# Patient Record
Sex: MALE | Race: WHITE | NOT HISPANIC OR LATINO | Employment: UNEMPLOYED | URBAN - NONMETROPOLITAN AREA
[De-identification: names, ages, dates, MRNs, and addresses within clinical notes are randomized per-mention and may not be internally consistent; named-entity substitution may affect disease eponyms.]

---

## 2017-01-03 RX ORDER — GABAPENTIN 600 MG/1
TABLET ORAL
Qty: 120 TABLET | Refills: 1 | Status: SHIPPED | OUTPATIENT
Start: 2017-01-03 | End: 2017-03-01 | Stop reason: SDUPTHER

## 2017-01-16 ENCOUNTER — OFFICE VISIT (OUTPATIENT)
Dept: FAMILY MEDICINE CLINIC | Facility: CLINIC | Age: 62
End: 2017-01-16

## 2017-01-16 VITALS
HEIGHT: 71 IN | OXYGEN SATURATION: 97 % | BODY MASS INDEX: 20.86 KG/M2 | WEIGHT: 149 LBS | DIASTOLIC BLOOD PRESSURE: 64 MMHG | SYSTOLIC BLOOD PRESSURE: 96 MMHG | HEART RATE: 60 BPM

## 2017-01-16 DIAGNOSIS — M48.061 SPINAL STENOSIS OF LUMBAR REGION: ICD-10-CM

## 2017-01-16 DIAGNOSIS — M51.36 DEGENERATION OF INTERVERTEBRAL DISC OF LUMBAR REGION: ICD-10-CM

## 2017-01-16 DIAGNOSIS — M47.812 OSTEOARTHRITIS OF CERVICAL SPINE, UNSPECIFIED SPINAL OSTEOARTHRITIS COMPLICATION STATUS: ICD-10-CM

## 2017-01-16 DIAGNOSIS — M48.02 SPINAL STENOSIS OF CERVICAL REGION: ICD-10-CM

## 2017-01-16 DIAGNOSIS — M47.892 OTHER OSTEOARTHRITIS OF SPINE, CERVICAL REGION: ICD-10-CM

## 2017-01-16 DIAGNOSIS — M50.30 DEGENERATION OF INTERVERTEBRAL DISC OF CERVICAL REGION: ICD-10-CM

## 2017-01-16 DIAGNOSIS — E11.9 WELL CONTROLLED DIABETES MELLITUS (HCC): ICD-10-CM

## 2017-01-16 DIAGNOSIS — G89.4 CHRONIC PAIN SYNDROME: Primary | ICD-10-CM

## 2017-01-16 LAB — GLUCOSE BLDC GLUCOMTR-MCNC: 112 MG/DL (ref 70–130)

## 2017-01-16 PROCEDURE — 82962 GLUCOSE BLOOD TEST: CPT | Performed by: FAMILY MEDICINE

## 2017-01-16 PROCEDURE — 99214 OFFICE O/P EST MOD 30 MIN: CPT | Performed by: FAMILY MEDICINE

## 2017-01-16 RX ORDER — OXYCODONE AND ACETAMINOPHEN 10; 325 MG/1; MG/1
TABLET ORAL
Qty: 180 TABLET | Refills: 0 | Status: SHIPPED | OUTPATIENT
Start: 2017-01-16 | End: 2017-02-16 | Stop reason: SDUPTHER

## 2017-01-16 NOTE — MR AVS SNAPSHOT
Michael Ned Cyrus   1/16/2017 10:45 AM   Office Visit    Dept Phone:  [phone]   Encounter #:  [id number]    Provider:  Diana Priest MD   Department:  [facility] PRIMARY CARE                Your Full Care Plan              Today's Medication Changes          These changes are accurate as of: 1/16/17 11:41 AM.  If you have any questions, ask your nurse or doctor.               Stop taking medication(s)listed here:     Vitamin D3 5000 UNITS tablet   Stopped by:  Diana Priest MD                Where to Get Your Medications      You can get these medications from any pharmacy     Bring a paper prescription for each of these medications     oxyCODONE-acetaminophen  MG per tablet                  Your Updated Medication List          This list is accurate as of: 1/16/17 11:41 AM.  Always use your most recent med list.                amitriptyline 75 MG tablet   Commonly known as:  ELAVIL       aspirin  MG tablet       atenolol 25 MG tablet   Commonly known as:  TENORMIN       B-12 1000 MCG capsule       buPROPion  MG 24 hr tablet   Commonly known as:  WELLBUTRIN XL       COMBIVENT RESPIMAT  MCG/ACT inhaler   Generic drug:  ipratropium-albuterol       cyclobenzaprine 10 MG tablet   Commonly known as:  FLEXERIL   Take 1 tablet by mouth 3 (Three) Times a Day As Needed for muscle spasms.       diphenhydrAMINE 25 mg capsule   Commonly known as:  BENADRYL       enalapril 5 MG tablet   Commonly known as:  VASOTEC       gabapentin 600 MG tablet   Commonly known as:  NEURONTIN   take 1 tablet by mouth twice a day MAY REPEAT 1 TO 2 TABLETS IF NEEDED       ibuprofen 800 MG tablet   Commonly known as:  ADVIL,MOTRIN       metFORMIN 500 MG tablet   Commonly known as:  GLUCOPHAGE       NASONEX 50 MCG/ACT nasal spray   Generic drug:  mometasone       oxyCODONE-acetaminophen  MG per tablet   Commonly known as:  ENDOCET   1 po q 4 hrs prn severe pain (up  "to 6 per day).       Polyethylene Glycol 3350 granules       raNITIdine 150 MG tablet   Commonly known as:  ZANTAC   take 1 tablet by mouth twice a day       simvastatin 20 MG tablet   Commonly known as:  ZOCOR               You Were Diagnosed With        Codes Comments    Chronic pain syndrome    -  Primary ICD-10-CM: G89.4  ICD-9-CM: 338.4     Spinal stenosis of cervical region     ICD-10-CM: M48.02  ICD-9-CM: 723.0     Spinal stenosis of lumbar region     ICD-10-CM: M48.06  ICD-9-CM: 724.02     Well controlled diabetes mellitus     ICD-10-CM: E11.9  ICD-9-CM: 250.00     Degeneration of intervertebral disc of lumbar region     ICD-10-CM: M51.36  ICD-9-CM: 722.52     Degeneration of intervertebral disc of cervical region     ICD-10-CM: M50.30  ICD-9-CM: 722.4     Other osteoarthritis of spine, cervical region     ICD-10-CM: M47.892     Osteoarthritis of cervical spine, unspecified spinal osteoarthritis complication status     ICD-10-CM: M47.812  ICD-9-CM: 721.0       Instructions     None    Patient Instructions History      Upcoming Appointments     Visit Type Date Time Department    FOLLOW UP 1/16/2017 10:45 AM [facility]      [facility] Signup     Our records indicate that you have declined [facility] signup. If you would like to sign up for [facility], please email [email or web address] or call [phone] to obtain an activation code.             Other Info from Your Visit           Allergies     Acetaminophen-codeine      Iodinated Diagnostic Agents      Morphine And Related      Other      Hydrocodone-acetaminophen  Itching, Rash      Reason for Visit     Back Pain left hip and down leg.      Vital Signs     Blood Pressure Pulse Height Weight Oxygen Saturation Body Mass Index    96/64 60 71\" (180.3 cm) 149 lb (67.6 kg) 97% 20.78 kg/m2    Smoking Status                   Current Every Day Smoker           Problems and Diagnoses Noted     Chronic pain syndrome    Degeneration of intervertebral " disc of cervical region    Degeneration of lumbar or lumbosacral intervertebral disc    Degenerative arthritis of cervical spine    Narrowing of cervical spine    Narrowing of spinal canal    Well controlled diabetes mellitus
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
## 2017-01-19 ENCOUNTER — TELEPHONE (OUTPATIENT)
Dept: FAMILY MEDICINE CLINIC | Facility: CLINIC | Age: 62
End: 2017-01-19

## 2017-01-19 NOTE — TELEPHONE ENCOUNTER
----- Message from Brandi Chambers sent at 1/19/2017 11:21 AM EST -----  Regarding: RE: F/U apts  Per Dr Dill's office, pt has canceled his back surgery 5 times and was no show for his follow-up in November.  They are allowing him to resched once more (sched for 1/26/17 @ 1:10 PM), but if he misses this appt, they will not resched him.    Per Dr Rico's office, pt has had two no shows since November - last appt was December no show.  They have resched him for next Wed 1/25/2016 @ 12:30 PM at the [facility]  @ [facility].     Pt wife has been informed of appt dates/times and also the no shows as well.  Voiced understanding.  ----- Message -----     From: Diana Priest MD     Sent: 1/16/2017   1:12 PM       To: Brandi Chambers  Subject: F/U apts                                         Pt/spouse state they needs assistance in rescheduling f/u with Dr. Rico's office as well as with Dr. Dill. Apparently she has been attempting to schedule f/u with Dr. Dill and isn't getting anywhere.

## 2017-02-09 ENCOUNTER — HOSPITAL ENCOUNTER (OUTPATIENT)
Dept: CT IMAGING | Facility: HOSPITAL | Age: 62
Discharge: HOME | End: 2017-02-09
Admitting: FAMILY MEDICINE

## 2017-02-09 DIAGNOSIS — R91.1 NODULE OF RIGHT LUNG: ICD-10-CM

## 2017-02-09 DIAGNOSIS — R93.89 ABNORMAL CHEST CT: ICD-10-CM

## 2017-02-09 PROCEDURE — 71250 CT THORAX DX C-: CPT

## 2017-02-16 ENCOUNTER — OFFICE VISIT (OUTPATIENT)
Dept: FAMILY MEDICINE CLINIC | Facility: CLINIC | Age: 62
End: 2017-02-16

## 2017-02-16 VITALS
BODY MASS INDEX: 21.56 KG/M2 | DIASTOLIC BLOOD PRESSURE: 78 MMHG | OXYGEN SATURATION: 98 % | WEIGHT: 154 LBS | SYSTOLIC BLOOD PRESSURE: 112 MMHG | HEART RATE: 68 BPM | HEIGHT: 71 IN

## 2017-02-16 DIAGNOSIS — M51.36 DEGENERATION OF INTERVERTEBRAL DISC OF LUMBAR REGION: ICD-10-CM

## 2017-02-16 DIAGNOSIS — E11.9 WELL CONTROLLED DIABETES MELLITUS (HCC): Primary | ICD-10-CM

## 2017-02-16 DIAGNOSIS — M47.812 OSTEOARTHRITIS OF CERVICAL SPINE, UNSPECIFIED SPINAL OSTEOARTHRITIS COMPLICATION STATUS: ICD-10-CM

## 2017-02-16 DIAGNOSIS — R91.1 NODULE OF RIGHT LUNG: ICD-10-CM

## 2017-02-16 DIAGNOSIS — F17.200 TOBACCO DEPENDENCE SYNDROME: ICD-10-CM

## 2017-02-16 DIAGNOSIS — M48.02 SPINAL STENOSIS OF CERVICAL REGION: ICD-10-CM

## 2017-02-16 DIAGNOSIS — M50.30 DEGENERATION OF INTERVERTEBRAL DISC OF CERVICAL REGION: ICD-10-CM

## 2017-02-16 DIAGNOSIS — M48.061 SPINAL STENOSIS OF LUMBAR REGION: ICD-10-CM

## 2017-02-16 DIAGNOSIS — G89.4 CHRONIC PAIN SYNDROME: ICD-10-CM

## 2017-02-16 LAB — GLUCOSE BLDC GLUCOMTR-MCNC: 122 MG/DL (ref 70–130)

## 2017-02-16 PROCEDURE — 99406 BEHAV CHNG SMOKING 3-10 MIN: CPT | Performed by: FAMILY MEDICINE

## 2017-02-16 PROCEDURE — 99214 OFFICE O/P EST MOD 30 MIN: CPT | Performed by: FAMILY MEDICINE

## 2017-02-16 PROCEDURE — 82962 GLUCOSE BLOOD TEST: CPT | Performed by: FAMILY MEDICINE

## 2017-02-16 RX ORDER — OXYCODONE AND ACETAMINOPHEN 10; 325 MG/1; MG/1
TABLET ORAL
Qty: 180 TABLET | Refills: 0 | Status: SHIPPED | OUTPATIENT
Start: 2017-02-16 | End: 2017-04-19 | Stop reason: SDUPTHER

## 2017-02-16 RX ORDER — NICOTINE 21 MG/24HR
1 PATCH, TRANSDERMAL 24 HOURS TRANSDERMAL EVERY 24 HOURS
Qty: 28 PATCH | Refills: 1 | Status: SHIPPED | OUTPATIENT
Start: 2017-02-16 | End: 2017-04-19
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
## 2017-03-01 RX ORDER — GABAPENTIN 600 MG/1
TABLET ORAL
Qty: 120 TABLET | Refills: 1 | Status: SHIPPED | OUTPATIENT
Start: 2017-03-01 | End: 2017-04-02 | Stop reason: SDUPTHER

## 2017-03-01 RX ORDER — RANITIDINE 150 MG/1
TABLET ORAL
Qty: 60 TABLET | Refills: 5 | Status: SHIPPED | OUTPATIENT
Start: 2017-03-01 | End: 2017-08-21 | Stop reason: SDUPTHER

## 2017-03-07 ENCOUNTER — APPOINTMENT (OUTPATIENT)
Dept: PREADMISSION TESTING | Facility: HOSPITAL | Age: 62
End: 2017-03-07

## 2017-03-07 VITALS — WEIGHT: 154 LBS | HEIGHT: 71 IN | BODY MASS INDEX: 21.56 KG/M2

## 2017-03-07 LAB
ANION GAP SERPL CALCULATED.3IONS-SCNC: 10.6 MMOL/L
APTT PPP: 31.8 SECONDS (ref 25–36)
BILIRUB UR QL STRIP: NEGATIVE
BUN BLD-MCNC: 10 MG/DL (ref 7–20)
BUN/CREAT SERPL: 10 (ref 6.3–21.9)
CALCIUM SPEC-SCNC: 9.2 MG/DL (ref 8.4–10.2)
CHLORIDE SERPL-SCNC: 105 MMOL/L (ref 98–107)
CLARITY UR: CLEAR
CO2 SERPL-SCNC: 30 MMOL/L (ref 26–30)
COLOR UR: YELLOW
CREAT BLD-MCNC: 1 MG/DL (ref 0.6–1.3)
DEPRECATED RDW RBC AUTO: 48.7 FL (ref 37–54)
ERYTHROCYTE [DISTWIDTH] IN BLOOD BY AUTOMATED COUNT: 14.5 % (ref 11.5–14.5)
GFR SERPL CREATININE-BSD FRML MDRD: 76 ML/MIN/1.73
GLUCOSE BLD-MCNC: 89 MG/DL (ref 74–98)
GLUCOSE UR STRIP-MCNC: NEGATIVE MG/DL
HCT VFR BLD AUTO: 48.1 % (ref 42–52)
HGB BLD-MCNC: 16.2 G/DL (ref 14–18)
HGB UR QL STRIP.AUTO: NEGATIVE
INR PPP: 0.97 (ref 0.9–1.1)
KETONES UR QL STRIP: NEGATIVE
LEUKOCYTE ESTERASE UR QL STRIP.AUTO: NEGATIVE
MCH RBC QN AUTO: 30.6 PG (ref 27–31)
MCHC RBC AUTO-ENTMCNC: 33.7 G/DL (ref 30–37)
MCV RBC AUTO: 90.8 FL (ref 80–94)
NITRITE UR QL STRIP: NEGATIVE
PH UR STRIP.AUTO: 7 [PH] (ref 5–8)
PLATELET # BLD AUTO: 255 10*3/MM3 (ref 130–400)
PMV BLD AUTO: 10.8 FL (ref 6–12)
POTASSIUM BLD-SCNC: 4.6 MMOL/L (ref 3.5–5.1)
PROT UR QL STRIP: NEGATIVE
PROTHROMBIN TIME: 10.6 SECONDS (ref 9.3–12.1)
RBC # BLD AUTO: 5.3 10*6/MM3 (ref 4.7–6.1)
SODIUM BLD-SCNC: 141 MMOL/L (ref 137–145)
SP GR UR STRIP: 1.01 (ref 1–1.03)
UROBILINOGEN UR QL STRIP: NORMAL
WBC NRBC COR # BLD: 6.68 10*3/MM3 (ref 4.8–10.8)

## 2017-03-07 PROCEDURE — 93005 ELECTROCARDIOGRAM TRACING: CPT | Performed by: ORTHOPAEDIC SURGERY

## 2017-03-07 PROCEDURE — 85027 COMPLETE CBC AUTOMATED: CPT | Performed by: ORTHOPAEDIC SURGERY

## 2017-03-07 PROCEDURE — 36415 COLL VENOUS BLD VENIPUNCTURE: CPT

## 2017-03-07 PROCEDURE — 85730 THROMBOPLASTIN TIME PARTIAL: CPT | Performed by: ORTHOPAEDIC SURGERY

## 2017-03-07 PROCEDURE — 80048 BASIC METABOLIC PNL TOTAL CA: CPT | Performed by: ORTHOPAEDIC SURGERY

## 2017-03-07 PROCEDURE — 85610 PROTHROMBIN TIME: CPT | Performed by: ORTHOPAEDIC SURGERY

## 2017-03-07 PROCEDURE — 81003 URINALYSIS AUTO W/O SCOPE: CPT | Performed by: ORTHOPAEDIC SURGERY

## 2017-03-07 NOTE — PAT
CALLED JONAH ASHFORD, CRNA R/T PT WITH A PULSE RATE ON EKG OF 45.  CALLED DR. ORLANDO'S OFFICE TO CHECK TO SEE WHAT TYPICAL HR IS FOR PT.  WAS TOLD THAT IT HAS BEEN 55 AND 52 ON THE LAST 2 VISITS (JUNE 2016 AND JAN 2017).  JONAH STATED PT NEEDED A CARDIAC CLEARANCE BEFORE SURGERY.  INFORMED PT/WIFE.  VERBALIZED UNDERSTANDING.  CALLED DR. TRIVEDI'S OFFICE.  LEFT NORIS A VOICE MSG TO CALL ME BACK.

## 2017-03-07 NOTE — DISCHARGE INSTRUCTIONS
Pt instructed on PAT PASS information.  Pt verbalizes understanding.  Chlorhexidine wipes along with instruction/verification sheet given to pt.  Instructed pt to apply stickers from chlorhexidine wipes to verification sheet once skin prep has been  completed, and to return to [facility] the day of the procedure.  Pt. Verbalizes understanding.

## 2017-03-08 NOTE — PAT
SPOKE WITH NORIS AT DR. TRIVEDI'S OFFICE.  STATED SHE WOULD ARRANGE APPT FOR PT TO HAVE CARDIAC CLEARANCE WITH DR. ORLANDO.

## 2017-03-14 ENCOUNTER — ANESTHESIA EVENT (OUTPATIENT)
Dept: PERIOP | Facility: HOSPITAL | Age: 62
End: 2017-03-14

## 2017-03-14 ENCOUNTER — HOSPITAL ENCOUNTER (OUTPATIENT)
Facility: HOSPITAL | Age: 62
Discharge: HOME | End: 2017-03-15
Attending: ORTHOPAEDIC SURGERY | Admitting: ORTHOPAEDIC SURGERY

## 2017-03-14 ENCOUNTER — ANESTHESIA (OUTPATIENT)
Dept: PERIOP | Facility: HOSPITAL | Age: 62
End: 2017-03-14

## 2017-03-14 ENCOUNTER — APPOINTMENT (OUTPATIENT)
Dept: GENERAL RADIOLOGY | Facility: HOSPITAL | Age: 62
End: 2017-03-14

## 2017-03-14 DIAGNOSIS — Z74.09 IMPAIRED MOBILITY AND ADLS: ICD-10-CM

## 2017-03-14 DIAGNOSIS — Z78.9 IMPAIRED MOBILITY AND ADLS: ICD-10-CM

## 2017-03-14 DIAGNOSIS — Z74.09 IMPAIRED FUNCTIONAL MOBILITY, BALANCE, GAIT, AND ENDURANCE: Primary | ICD-10-CM

## 2017-03-14 LAB
ABO GROUP BLD: NORMAL
ABO GROUP BLD: NORMAL
BLD GP AB SCN SERPL QL: NEGATIVE
GLUCOSE BLDC GLUCOMTR-MCNC: 69 MG/DL (ref 70–130)
GLUCOSE BLDC GLUCOMTR-MCNC: 75 MG/DL (ref 70–130)
GLUCOSE BLDC GLUCOMTR-MCNC: 96 MG/DL (ref 70–130)
RH BLD: POSITIVE
RH BLD: POSITIVE

## 2017-03-14 PROCEDURE — 86901 BLOOD TYPING SEROLOGIC RH(D): CPT | Performed by: ORTHOPAEDIC SURGERY

## 2017-03-14 PROCEDURE — G0378 HOSPITAL OBSERVATION PER HR: HCPCS

## 2017-03-14 PROCEDURE — 86900 BLOOD TYPING SEROLOGIC ABO: CPT

## 2017-03-14 PROCEDURE — 94762 N-INVAS EAR/PLS OXIMTRY CONT: CPT

## 2017-03-14 PROCEDURE — 25010000002 PROPOFOL 200 MG/20ML EMULSION: Performed by: NURSE ANESTHETIST, CERTIFIED REGISTERED

## 2017-03-14 PROCEDURE — 86900 BLOOD TYPING SEROLOGIC ABO: CPT | Performed by: ORTHOPAEDIC SURGERY

## 2017-03-14 PROCEDURE — 86850 RBC ANTIBODY SCREEN: CPT | Performed by: ORTHOPAEDIC SURGERY

## 2017-03-14 PROCEDURE — 25010000002 HYDROMORPHONE PER 4 MG

## 2017-03-14 PROCEDURE — 25010000002 SUCCINYLCHOLINE PER 20 MG: Performed by: NURSE ANESTHETIST, CERTIFIED REGISTERED

## 2017-03-14 PROCEDURE — 82962 GLUCOSE BLOOD TEST: CPT

## 2017-03-14 PROCEDURE — 25010000002 MIDAZOLAM PER 1 MG: Performed by: NURSE ANESTHETIST, CERTIFIED REGISTERED

## 2017-03-14 PROCEDURE — 25010000002 METHYLPREDNISOLONE PER 40 MG: Performed by: ORTHOPAEDIC SURGERY

## 2017-03-14 PROCEDURE — 94799 UNLISTED PULMONARY SVC/PX: CPT

## 2017-03-14 PROCEDURE — 25010000003 CEFAZOLIN PER 500 MG: Performed by: ORTHOPAEDIC SURGERY

## 2017-03-14 PROCEDURE — 86901 BLOOD TYPING SEROLOGIC RH(D): CPT

## 2017-03-14 PROCEDURE — 25010000002 FENTANYL CITRATE (PF) 250 MCG/5ML SOLUTION: Performed by: NURSE ANESTHETIST, CERTIFIED REGISTERED

## 2017-03-14 PROCEDURE — 76000 FLUOROSCOPY <1 HR PHYS/QHP: CPT

## 2017-03-14 RX ORDER — LIDOCAINE HYDROCHLORIDE 10 MG/ML
INJECTION, SOLUTION INFILTRATION; PERINEURAL AS NEEDED
Status: DISCONTINUED | OUTPATIENT
Start: 2017-03-14 | End: 2017-03-14 | Stop reason: HOSPADM

## 2017-03-14 RX ORDER — FENTANYL CITRATE 50 UG/ML
INJECTION, SOLUTION INTRAMUSCULAR; INTRAVENOUS AS NEEDED
Status: DISCONTINUED | OUTPATIENT
Start: 2017-03-14 | End: 2017-03-14 | Stop reason: SURG

## 2017-03-14 RX ORDER — GABAPENTIN 300 MG/1
600 CAPSULE ORAL EVERY 8 HOURS SCHEDULED
Status: DISCONTINUED | OUTPATIENT
Start: 2017-03-14 | End: 2017-03-15 | Stop reason: HOSPADM

## 2017-03-14 RX ORDER — PROMETHAZINE HYDROCHLORIDE 25 MG/1
25 SUPPOSITORY RECTAL ONCE AS NEEDED
Status: DISCONTINUED | OUTPATIENT
Start: 2017-03-14 | End: 2017-03-14 | Stop reason: HOSPADM

## 2017-03-14 RX ORDER — SODIUM CHLORIDE, SODIUM LACTATE, POTASSIUM CHLORIDE, CALCIUM CHLORIDE 600; 310; 30; 20 MG/100ML; MG/100ML; MG/100ML; MG/100ML
1000 INJECTION, SOLUTION INTRAVENOUS CONTINUOUS PRN
Status: DISCONTINUED | OUTPATIENT
Start: 2017-03-14 | End: 2017-03-14 | Stop reason: HOSPADM

## 2017-03-14 RX ORDER — METHYLPREDNISOLONE ACETATE 80 MG/ML
INJECTION, SUSPENSION INTRA-ARTICULAR; INTRALESIONAL; INTRAMUSCULAR; SOFT TISSUE
Status: DISPENSED
Start: 2017-03-14 | End: 2017-03-15

## 2017-03-14 RX ORDER — IPRATROPIUM BROMIDE AND ALBUTEROL SULFATE 2.5; .5 MG/3ML; MG/3ML
3 SOLUTION RESPIRATORY (INHALATION) ONCE AS NEEDED
Status: DISCONTINUED | OUTPATIENT
Start: 2017-03-14 | End: 2017-03-14 | Stop reason: HOSPADM

## 2017-03-14 RX ORDER — SODIUM CHLORIDE 0.9 % (FLUSH) 0.9 %
1-10 SYRINGE (ML) INJECTION AS NEEDED
Status: DISCONTINUED | OUTPATIENT
Start: 2017-03-14 | End: 2017-03-15 | Stop reason: HOSPADM

## 2017-03-14 RX ORDER — FAMOTIDINE 20 MG/1
20 TABLET, FILM COATED ORAL 2 TIMES DAILY
Status: DISCONTINUED | OUTPATIENT
Start: 2017-03-14 | End: 2017-03-15 | Stop reason: HOSPADM

## 2017-03-14 RX ORDER — PROPOFOL 10 MG/ML
INJECTION, EMULSION INTRAVENOUS AS NEEDED
Status: DISCONTINUED | OUTPATIENT
Start: 2017-03-14 | End: 2017-03-14 | Stop reason: SURG

## 2017-03-14 RX ORDER — SODIUM CHLORIDE 9 MG/ML
100 INJECTION, SOLUTION INTRAVENOUS CONTINUOUS
Status: DISCONTINUED | OUTPATIENT
Start: 2017-03-14 | End: 2017-03-15 | Stop reason: HOSPADM

## 2017-03-14 RX ORDER — IPRATROPIUM BROMIDE AND ALBUTEROL SULFATE 2.5; .5 MG/3ML; MG/3ML
3 SOLUTION RESPIRATORY (INHALATION) EVERY 6 HOURS PRN
Status: DISCONTINUED | OUTPATIENT
Start: 2017-03-14 | End: 2017-03-15 | Stop reason: HOSPADM

## 2017-03-14 RX ORDER — NEOSTIGMINE METHYLSULFATE 5 MG/5 ML
SYRINGE (ML) INTRAVENOUS AS NEEDED
Status: DISCONTINUED | OUTPATIENT
Start: 2017-03-14 | End: 2017-03-14 | Stop reason: SURG

## 2017-03-14 RX ORDER — CYCLOBENZAPRINE HCL 10 MG
10 TABLET ORAL 3 TIMES DAILY PRN
Status: DISCONTINUED | OUTPATIENT
Start: 2017-03-14 | End: 2017-03-15 | Stop reason: HOSPADM

## 2017-03-14 RX ORDER — OXYCODONE AND ACETAMINOPHEN 10; 325 MG/1; MG/1
TABLET ORAL
Status: COMPLETED
Start: 2017-03-14 | End: 2017-03-14

## 2017-03-14 RX ORDER — ONDANSETRON 2 MG/ML
4 INJECTION INTRAMUSCULAR; INTRAVENOUS EVERY 6 HOURS PRN
Status: DISCONTINUED | OUTPATIENT
Start: 2017-03-14 | End: 2017-03-15 | Stop reason: HOSPADM

## 2017-03-14 RX ORDER — SUCCINYLCHOLINE CHLORIDE 20 MG/ML
INJECTION INTRAMUSCULAR; INTRAVENOUS AS NEEDED
Status: DISCONTINUED | OUTPATIENT
Start: 2017-03-14 | End: 2017-03-14 | Stop reason: SURG

## 2017-03-14 RX ORDER — PROMETHAZINE HYDROCHLORIDE 25 MG/ML
6.25 INJECTION, SOLUTION INTRAMUSCULAR; INTRAVENOUS ONCE AS NEEDED
Status: DISCONTINUED | OUTPATIENT
Start: 2017-03-14 | End: 2017-03-14 | Stop reason: HOSPADM

## 2017-03-14 RX ORDER — LIDOCAINE HYDROCHLORIDE 10 MG/ML
INJECTION, SOLUTION INFILTRATION; PERINEURAL
Status: DISPENSED
Start: 2017-03-14 | End: 2017-03-15

## 2017-03-14 RX ORDER — ATORVASTATIN CALCIUM 10 MG/1
10 TABLET, FILM COATED ORAL DAILY
Status: DISCONTINUED | OUTPATIENT
Start: 2017-03-14 | End: 2017-03-15 | Stop reason: HOSPADM

## 2017-03-14 RX ORDER — NICOTINE 21 MG/24HR
1 PATCH, TRANSDERMAL 24 HOURS TRANSDERMAL EVERY 24 HOURS
Status: DISCONTINUED | OUTPATIENT
Start: 2017-03-14 | End: 2017-03-15 | Stop reason: HOSPADM

## 2017-03-14 RX ORDER — ALBUTEROL SULFATE 90 UG/1
AEROSOL, METERED RESPIRATORY (INHALATION) AS NEEDED
Status: DISCONTINUED | OUTPATIENT
Start: 2017-03-14 | End: 2017-03-14 | Stop reason: SURG

## 2017-03-14 RX ORDER — ONDANSETRON 4 MG/1
4 TABLET, ORALLY DISINTEGRATING ORAL EVERY 6 HOURS PRN
Status: DISCONTINUED | OUTPATIENT
Start: 2017-03-14 | End: 2017-03-15 | Stop reason: HOSPADM

## 2017-03-14 RX ORDER — DIPHENHYDRAMINE HCL 25 MG
25 CAPSULE ORAL EVERY 6 HOURS PRN
Status: DISCONTINUED | OUTPATIENT
Start: 2017-03-14 | End: 2017-03-15 | Stop reason: HOSPADM

## 2017-03-14 RX ORDER — SODIUM CHLORIDE, SODIUM LACTATE, POTASSIUM CHLORIDE, CALCIUM CHLORIDE 600; 310; 30; 20 MG/100ML; MG/100ML; MG/100ML; MG/100ML
100 INJECTION, SOLUTION INTRAVENOUS CONTINUOUS
Status: DISCONTINUED | OUTPATIENT
Start: 2017-03-14 | End: 2017-03-14

## 2017-03-14 RX ORDER — DOCUSATE SODIUM 100 MG/1
100 CAPSULE, LIQUID FILLED ORAL 2 TIMES DAILY PRN
Status: DISCONTINUED | OUTPATIENT
Start: 2017-03-14 | End: 2017-03-15 | Stop reason: HOSPADM

## 2017-03-14 RX ORDER — GABAPENTIN 300 MG/1
CAPSULE ORAL
Status: COMPLETED
Start: 2017-03-14 | End: 2017-03-14

## 2017-03-14 RX ORDER — OXYCODONE AND ACETAMINOPHEN 10; 325 MG/1; MG/1
1 TABLET ORAL ONCE
Status: COMPLETED | OUTPATIENT
Start: 2017-03-14 | End: 2017-03-14

## 2017-03-14 RX ORDER — ONDANSETRON 2 MG/ML
4 INJECTION INTRAMUSCULAR; INTRAVENOUS ONCE AS NEEDED
Status: DISCONTINUED | OUTPATIENT
Start: 2017-03-14 | End: 2017-03-14 | Stop reason: HOSPADM

## 2017-03-14 RX ORDER — NALOXONE HCL 0.4 MG/ML
0.4 VIAL (ML) INJECTION
Status: DISCONTINUED | OUTPATIENT
Start: 2017-03-14 | End: 2017-03-15 | Stop reason: HOSPADM

## 2017-03-14 RX ORDER — OXYCODONE AND ACETAMINOPHEN 10; 325 MG/1; MG/1
1 TABLET ORAL EVERY 4 HOURS PRN
Status: DISCONTINUED | OUTPATIENT
Start: 2017-03-14 | End: 2017-03-15 | Stop reason: HOSPADM

## 2017-03-14 RX ORDER — ENALAPRIL MALEATE 5 MG/1
5 TABLET ORAL DAILY
Status: DISCONTINUED | OUTPATIENT
Start: 2017-03-14 | End: 2017-03-15 | Stop reason: HOSPADM

## 2017-03-14 RX ORDER — ATENOLOL 25 MG/1
25 TABLET ORAL DAILY
Status: DISCONTINUED | OUTPATIENT
Start: 2017-03-14 | End: 2017-03-15 | Stop reason: HOSPADM

## 2017-03-14 RX ORDER — MIDAZOLAM HYDROCHLORIDE 1 MG/ML
INJECTION INTRAMUSCULAR; INTRAVENOUS AS NEEDED
Status: DISCONTINUED | OUTPATIENT
Start: 2017-03-14 | End: 2017-03-14 | Stop reason: SURG

## 2017-03-14 RX ORDER — GLYCOPYRROLATE 0.2 MG/ML
INJECTION INTRAMUSCULAR; INTRAVENOUS AS NEEDED
Status: DISCONTINUED | OUTPATIENT
Start: 2017-03-14 | End: 2017-03-14 | Stop reason: SURG

## 2017-03-14 RX ORDER — ROCURONIUM BROMIDE 10 MG/ML
INJECTION, SOLUTION INTRAVENOUS AS NEEDED
Status: DISCONTINUED | OUTPATIENT
Start: 2017-03-14 | End: 2017-03-14 | Stop reason: SURG

## 2017-03-14 RX ORDER — GABAPENTIN 300 MG/1
600 CAPSULE ORAL ONCE
Status: COMPLETED | OUTPATIENT
Start: 2017-03-14 | End: 2017-03-14

## 2017-03-14 RX ORDER — OXYCODONE AND ACETAMINOPHEN 10; 325 MG/1; MG/1
1 TABLET ORAL EVERY 4 HOURS PRN
Qty: 60 TABLET | Refills: 0 | Status: SHIPPED | OUTPATIENT
Start: 2017-03-14 | End: 2017-04-19 | Stop reason: SDUPTHER

## 2017-03-14 RX ORDER — PROMETHAZINE HYDROCHLORIDE 25 MG/1
25 TABLET ORAL ONCE AS NEEDED
Status: DISCONTINUED | OUTPATIENT
Start: 2017-03-14 | End: 2017-03-14 | Stop reason: HOSPADM

## 2017-03-14 RX ORDER — ONDANSETRON 4 MG/1
4 TABLET, FILM COATED ORAL EVERY 6 HOURS PRN
Status: DISCONTINUED | OUTPATIENT
Start: 2017-03-14 | End: 2017-03-15 | Stop reason: HOSPADM

## 2017-03-14 RX ORDER — METHYLPREDNISOLONE ACETATE 40 MG/ML
INJECTION, SUSPENSION INTRA-ARTICULAR; INTRALESIONAL; INTRAMUSCULAR; SOFT TISSUE AS NEEDED
Status: DISCONTINUED | OUTPATIENT
Start: 2017-03-14 | End: 2017-03-14 | Stop reason: HOSPADM

## 2017-03-14 RX ORDER — MEPERIDINE HYDROCHLORIDE 25 MG/ML
12.5 INJECTION INTRAMUSCULAR; INTRAVENOUS; SUBCUTANEOUS
Status: DISCONTINUED | OUTPATIENT
Start: 2017-03-14 | End: 2017-03-14 | Stop reason: HOSPADM

## 2017-03-14 RX ORDER — MORPHINE SULFATE 2 MG/ML
2 INJECTION, SOLUTION INTRAMUSCULAR; INTRAVENOUS EVERY 4 HOURS PRN
Status: DISCONTINUED | OUTPATIENT
Start: 2017-03-14 | End: 2017-03-15 | Stop reason: HOSPADM

## 2017-03-14 RX ADMIN — AMITRIPTYLINE HYDROCHLORIDE 75 MG: 50 TABLET, FILM COATED ORAL at 22:34

## 2017-03-14 RX ADMIN — FENTANYL CITRATE 50 MCG: 50 INJECTION, SOLUTION INTRAMUSCULAR; INTRAVENOUS at 17:02

## 2017-03-14 RX ADMIN — Medication 4 MG: at 16:49

## 2017-03-14 RX ADMIN — GLYCOPYRROLATE 0.4 MG: 0.2 INJECTION, SOLUTION INTRAMUSCULAR; INTRAVENOUS at 16:49

## 2017-03-14 RX ADMIN — ROCURONIUM BROMIDE 5 MG: 10 INJECTION INTRAVENOUS at 16:04

## 2017-03-14 RX ADMIN — FAMOTIDINE 20 MG: 20 TABLET, FILM COATED ORAL at 22:37

## 2017-03-14 RX ADMIN — ROCURONIUM BROMIDE 5 MG: 10 INJECTION INTRAVENOUS at 14:16

## 2017-03-14 RX ADMIN — SODIUM CHLORIDE, POTASSIUM CHLORIDE, SODIUM LACTATE AND CALCIUM CHLORIDE 100 ML/HR: 600; 310; 30; 20 INJECTION, SOLUTION INTRAVENOUS at 12:30

## 2017-03-14 RX ADMIN — PROPOFOL 150 MG: 10 INJECTION, EMULSION INTRAVENOUS at 14:16

## 2017-03-14 RX ADMIN — ATENOLOL 25 MG: 25 TABLET ORAL at 22:35

## 2017-03-14 RX ADMIN — SUCCINYLCHOLINE CHLORIDE 139.8 MG: 20 INJECTION, SOLUTION INTRAMUSCULAR; INTRAVENOUS at 14:16

## 2017-03-14 RX ADMIN — Medication 100 MCG: at 14:41

## 2017-03-14 RX ADMIN — HYDROMORPHONE HYDROCHLORIDE 0.5 MG: 1 INJECTION, SOLUTION INTRAMUSCULAR; INTRAVENOUS; SUBCUTANEOUS at 17:29

## 2017-03-14 RX ADMIN — ROCURONIUM BROMIDE 10 MG: 10 INJECTION INTRAVENOUS at 15:16

## 2017-03-14 RX ADMIN — CEFAZOLIN SODIUM 2 G: 2 SOLUTION INTRAVENOUS at 22:46

## 2017-03-14 RX ADMIN — MIDAZOLAM HYDROCHLORIDE 2 MG: 1 INJECTION, SOLUTION INTRAMUSCULAR; INTRAVENOUS at 14:13

## 2017-03-14 RX ADMIN — ENALAPRIL MALEATE 5 MG: 5 TABLET ORAL at 22:37

## 2017-03-14 RX ADMIN — Medication 0.5 MG: at 17:29

## 2017-03-14 RX ADMIN — GABAPENTIN 600 MG: 300 CAPSULE ORAL at 14:04

## 2017-03-14 RX ADMIN — ATORVASTATIN CALCIUM 10 MG: 10 TABLET, FILM COATED ORAL at 22:36

## 2017-03-14 RX ADMIN — GLYCOPYRROLATE 0.2 MG: 0.2 INJECTION, SOLUTION INTRAMUSCULAR; INTRAVENOUS at 14:16

## 2017-03-14 RX ADMIN — SODIUM CHLORIDE, POTASSIUM CHLORIDE, SODIUM LACTATE AND CALCIUM CHLORIDE 100 ML/HR: 600; 310; 30; 20 INJECTION, SOLUTION INTRAVENOUS at 19:53

## 2017-03-14 RX ADMIN — FENTANYL CITRATE 100 MCG: 50 INJECTION, SOLUTION INTRAMUSCULAR; INTRAVENOUS at 14:16

## 2017-03-14 RX ADMIN — SODIUM CHLORIDE, POTASSIUM CHLORIDE, SODIUM LACTATE AND CALCIUM CHLORIDE: 600; 310; 30; 20 INJECTION, SOLUTION INTRAVENOUS at 16:40

## 2017-03-14 RX ADMIN — OXYCODONE AND ACETAMINOPHEN 1 TABLET: 10; 325 TABLET ORAL at 14:04

## 2017-03-14 RX ADMIN — GABAPENTIN 600 MG: 300 CAPSULE ORAL at 22:34

## 2017-03-14 RX ADMIN — ROCURONIUM BROMIDE 20 MG: 10 INJECTION INTRAVENOUS at 14:26

## 2017-03-14 RX ADMIN — NICOTINE 1 PATCH: 21 PATCH TRANSDERMAL at 22:33

## 2017-03-14 RX ADMIN — Medication 100 MCG: at 14:21

## 2017-03-14 RX ADMIN — OXYCODONE HYDROCHLORIDE AND ACETAMINOPHEN 1 TABLET: 10; 325 TABLET ORAL at 14:04

## 2017-03-14 RX ADMIN — ALBUTEROL SULFATE 12 PUFF: 90 AEROSOL, METERED RESPIRATORY (INHALATION) at 14:26

## 2017-03-14 NOTE — PLAN OF CARE
Problem: Perioperative Period (Adult)  Goal: Signs and Symptoms of Listed Potential Problems Will be Absent or Manageable (Perioperative Period)  Outcome: Ongoing (interventions implemented as appropriate)    03/14/17 1221   Perioperative Period   Problems Assessed (Perioperative Period) all   Problems Present (Perioperative Period) pain

## 2017-03-14 NOTE — OP NOTE
Date of Procedure: 03/14/2017    PREOPERATIVE DIAGNOSES:  L4-5 and L5-S1 lumbar stenosis.     POSTOPERATIVE DIAGNOSES: L4-5 and L5-S1 lumbar stenosis.     PROCEDURES PERFORMED:  L4-5 and L5-S1 laminectomy, CPT codes 01102, 12351.    SURGEON: Bert Dill MD    ASSISTANT: None.     COMPLICATION: None.    SPECIMEN: None.     IMPLANT: None.    ESTIMATED BLOOD LOSS: 100 mL.     ANESTHESIA: General.     DESCRIPTION OF PROCEDURE: The patient was identified in the preoperative holding area at [facility] and transported to the operating room under the care of myself and anesthesia staff. The patient was placed supine on his bed where general anesthesia was induced. Once he was in the bed and the table secured he was placed into a prone position on the Zafar table with a Chuckie frame. All bony prominences were well-padded. We performed a timeout ensuring the correct patient, procedure, and levels being done. We then confirmed the L4-5 and L5-S1 levels by fluoroscopy. We then made a midline incision at L4-5 and L5-S1. Soft tissue dissection was carried down to the level of the dorsolumbar fascia and was divided in line with the incision. We then dissected out the level of the facets and placed a self-retaining retractor at the L4-5 and L5-S1 levels. This allowed us to visualize these and perform the laminectomy.     Laminectomy was performed by taking a Leksell rongeur and removing the spinous processes. We then used an ultrasonic bone scalpel from [employer] to remove the lamina at L4 and L5. We then had residual ligamentum flavum that was removed using a pituitary rongeur and then we undercut the medial aspect of the facets at L4-5 and L5-S1 and decompressed the lateral recesses at these levels. We then decompressed the foramen at L4-5 and L5-S1 by removing any of the underlying ligamentum flavum. Chuckie elevator was used to confirm decompression in the foramen. We then turned our attention towards closure.  All of his bleeding was stopped with FloSeal and electrocautery. We then placed Avitene and steroid placed directly under the nerve roots. We then performed a layered closure with 0 Vicryl used for the fascia, 2-0 Vicryl suture used for subcutaneous tissue, and running Monocryl and Dermabond glue used for the skin. The patient was given local anesthetic into the muscle and subcutaneous tissue. The patient tolerated the procedure well. All counts were correct x2. Valsalva maneuver was performed at the end of the case to ensure there was no CSF leak and there was none.        Bert Dill M.D.  D: CRISTIAN 03/14/2017 16:55:11  T: ab 03/14/2017 18:41:20  Job ID: [id number]  Document ID: [id number]

## 2017-03-14 NOTE — PLAN OF CARE
Problem: Perioperative Period (Adult)  Goal: Signs and Symptoms of Listed Potential Problems Will be Absent or Manageable (Perioperative Period)  Outcome: Ongoing (interventions implemented as appropriate)    03/14/17 2771   Perioperative Period   Problems Assessed (Perioperative Period) all   Problems Present (Perioperative Period) pain

## 2017-03-14 NOTE — ANESTHESIA POSTPROCEDURE EVALUATION
Patient: Michael Bridges    Procedure Summary     Date Anesthesia Start Anesthesia Stop Room / Location    03/14/17 1409  [facility] / [facility]       Procedure Diagnosis Surgeon Provider    L4-5, L5-S1 LAMINECTOMY  (N/A Spine Lumbar) No diagnosis on file. MD Bertram Matthews CRNA          Anesthesia Type: general  Last vitals  BP      Temp      Pulse     Resp      SpO2        Post Anesthesia Care and Evaluation    Patient location during evaluation: PACU  Patient participation: complete - patient participated  Level of consciousness: awake  Pain score: 3  Pain management: adequate  Airway patency: patent  Anesthetic complications: No anesthetic complications  PONV Status: controlled  Cardiovascular status: acceptable and stable  Respiratory status: acceptable and face mask  Hydration status: acceptable

## 2017-03-14 NOTE — BRIEF OP NOTE
LUMBAR LAMINECTOMY WITH/WITHOUT FUSION  Procedure Note    Michael Bridges  3/14/2017    Pre-op Diagnosis:   L4-S1 spinal stenosis    Post-op Diagnosis:     same    Procedure/CPT® Codes:      Procedure(s):  L4-5, L5-S1 LAMINECTOMY     Surgeon(s):  Bert Dill MD    Anesthesia: General    Staff:   Circulator: Ramiro Lo RN; Dorcas Tucker RN  Radiology Technologist: Carlee Stanton  Scrub Person: Yu Bro; Vincent Dumont; Herlinda Cordova    Estimated Blood Loss: * No values recorded between 3/14/2017  2:08 PM and 3/14/2017  4:41 PM *  Urine Voided: * No values recorded between 3/14/2017  2:08 PM and 3/14/2017  4:41 PM *    Specimens:                * No specimens in log *      Drains:           Findings: see dict    Complications: none      Bert Dill MD     Date: 3/14/2017  Time: 4:41 PM

## 2017-03-14 NOTE — PLAN OF CARE
Problem: Perioperative Period (Adult)  Intervention: Promote Pulmonary Hygiene and Secretion Clearance    03/14/17 1712   Positioning   Head Of Bed (HOB) Position HOB at 20-30 degrees   Promote Aggressive Pulmonary Hygiene/Secretion Management   Cough And Deep Breathing done with encouragement       Intervention: Monitor/Manage Pain    03/14/17 1729   Safety Interventions   Medication Review/Management medications reviewed   Manage Acute Burn Pain   Pain Management Interventions medicated       Intervention: Promote Normothermia    03/14/17 1657   Cardiac Interventions   Warming Thermoregulation Maintenance warm blankets applied;skin exposure time minimized

## 2017-03-14 NOTE — ANESTHESIA PREPROCEDURE EVALUATION
Anesthesia Evaluation     Patient summary reviewed and Nursing notes reviewed   no history of anesthetic complications:  NPO Status: > 8 hours   Airway   Mallampati: II  TM distance: >3 FB  Neck ROM: limited  no difficulty expected  Dental    (+) edentulous    Pulmonary     breath sounds clear to auscultation  (+) a smoker (1 ppd, abstained today) Current, COPD moderate, shortness of breath,   (-) pneumonia, pulmonary embolism    ROS comment: Cxr: nad  Cardiovascular   Exercise tolerance: poor (<4 METS)    ECG reviewed  Patient on routine beta blocker and Beta blocker given within 24 hours of surgery  Rhythm: regular  Rate: normal    (+) hypertension well controlled, CAD, SMITH, PVD, hyperlipidemia  (-) pacemaker, valvular problems/murmurs, past MI, dysrhythmias, angina, cardiac stents, DVT    ROS comment: Echo: EF 58%  Cardiac clearance    Neuro/Psych  (+) tremors, weakness, numbness (left leg),    (-) seizures, CVA, dizziness/light headedness  GI/Hepatic/Renal/Endo    (+)  GERD well controlled, diabetes mellitus (fsbs 75) type 2 well controlled,   (-) hepatitis, liver disease, renal disease    Musculoskeletal     (+) back pain, chronic pain, gait problem (staggers), neck pain,   (-) arthralgias  Abdominal    Substance History      OB/GYN          Other   (+) arthritis                               Anesthesia Plan    ASA 3     general   (Risks and benefits discussed including risk of aspiration, recall and dental damage. All patient questions answered. Will continue with POC.)  intravenous induction   Anesthetic plan and risks discussed with patient.

## 2017-03-14 NOTE — ANESTHESIA PROCEDURE NOTES
Airway  Urgency: elective    Airway not difficult    General Information and Staff    Patient location during procedure: OR  CRNA: THIERRY VEGA    Indications and Patient Condition  Indications for airway management: airway protection    Preoxygenated: yes  MILS maintained throughout  Mask difficulty assessment: 1 - vent by mask    Final Airway Details  Final airway type: endotracheal airway      Successful airway: ETT  Cuffed: yes   Successful intubation technique: direct laryngoscopy  Facilitating devices/methods: intubating stylet and cricoid pressure  Blade: Guy  Blade size: #3  ETT size: 7.5 mm  Cormack-Lehane Classification: grade I - full view of glottis  Placement verified by: chest auscultation and capnometry   Cuff volume (mL): 5  Measured from: lips  ETT to lips (cm): 18  Number of attempts at approach: 1

## 2017-03-15 VITALS
WEIGHT: 152.9 LBS | HEIGHT: 70 IN | RESPIRATION RATE: 18 BRPM | DIASTOLIC BLOOD PRESSURE: 58 MMHG | OXYGEN SATURATION: 98 % | HEART RATE: 48 BPM | SYSTOLIC BLOOD PRESSURE: 107 MMHG | TEMPERATURE: 97.1 F | BODY MASS INDEX: 21.89 KG/M2

## 2017-03-15 LAB
GLUCOSE BLDC GLUCOMTR-MCNC: 117 MG/DL (ref 70–130)
GLUCOSE BLDC GLUCOMTR-MCNC: 163 MG/DL (ref 70–130)

## 2017-03-15 PROCEDURE — 97165 OT EVAL LOW COMPLEX 30 MIN: CPT

## 2017-03-15 PROCEDURE — 94799 UNLISTED PULMONARY SVC/PX: CPT

## 2017-03-15 PROCEDURE — 25010000003 CEFAZOLIN PER 500 MG: Performed by: ORTHOPAEDIC SURGERY

## 2017-03-15 PROCEDURE — G0378 HOSPITAL OBSERVATION PER HR: HCPCS

## 2017-03-15 PROCEDURE — 97161 PT EVAL LOW COMPLEX 20 MIN: CPT

## 2017-03-15 PROCEDURE — 82962 GLUCOSE BLOOD TEST: CPT

## 2017-03-15 RX ADMIN — OXYCODONE HYDROCHLORIDE AND ACETAMINOPHEN 1 TABLET: 10; 325 TABLET ORAL at 06:01

## 2017-03-15 RX ADMIN — ATENOLOL 25 MG: 25 TABLET ORAL at 09:05

## 2017-03-15 RX ADMIN — SODIUM CHLORIDE 100 ML/HR: 9 INJECTION, SOLUTION INTRAVENOUS at 06:04

## 2017-03-15 RX ADMIN — ENALAPRIL MALEATE 5 MG: 5 TABLET ORAL at 09:05

## 2017-03-15 RX ADMIN — GABAPENTIN 600 MG: 300 CAPSULE ORAL at 06:00

## 2017-03-15 RX ADMIN — METFORMIN HYDROCHLORIDE 500 MG: 500 TABLET, FILM COATED ORAL at 09:00

## 2017-03-15 RX ADMIN — FAMOTIDINE 20 MG: 20 TABLET, FILM COATED ORAL at 09:05

## 2017-03-15 RX ADMIN — CEFAZOLIN SODIUM 2 G: 2 SOLUTION INTRAVENOUS at 05:59

## 2017-03-15 NOTE — PROGRESS NOTES
Continued Stay Note   [facility]     Patient Name: Michael Bridges  MRN: [id number]  Today's Date: 3/15/2017    Admit Date: 3/14/2017          Discharge Plan       03/15/17 0930    Case Management/Social Work Plan    Plan Spoke to pt and wife in room.  Lives in one story house, independent with ADLS, but does have a WC and a Walker that he uses.  Plans to go home discharge denies further discharge needs.                Discharge Codes     None        Expected Discharge Date and Time     Expected Discharge Date Expected Discharge Time    Mar 15, 2017             Pratima Braga

## 2017-03-15 NOTE — DISCHARGE SUMMARY
Date of Discharge:  3/15/2017    Discharge Diagnosis: s/p l4/5 and L5/s1 laminectomy     Presenting Problem/History of Present Illness  Spinal stenosis of lumbar region [M48.06]       Hospital Course  Patient is a 62 y.o. male presented with progressive difficulty walking.  Done well since surgery. Walking without difficulty. No leg pain. No headaches, general back pain that is normal.    Procedures Performed  Procedure(s):  L4-5, L5-S1 LAMINECTOMY        Consults:   Consults     No orders found for last 30 day(s).          Pertinent Test Results: none    Condition on Discharge:  stable    Vital Signs  Temp:  [97.6 °F (36.4 °C)-98.6 °F (37 °C)] 97.6 °F (36.4 °C)  Heart Rate:  [50-95] 74  Resp:  [12-18] 18  BP: (105-156)/(56-90) 115/60    Physical Exam:   Incision C/D/I    Pt ambulating without difficulty   Sitting up in bed eating   Sensation intact   Strength 5/5 BLE    Discharge Disposition  Home or Self Care    Discharge Medications   Michael Bridges   Home Medication Instructions NAFISA:[id number]    Printed on:03/15/17 0826   Medication Information                      amitriptyline (ELAVIL) 75 MG tablet  Take  by mouth Every Night.             aspirin  MG EC tablet  Take 325 mg by mouth Daily.             atenolol (TENORMIN) 25 MG tablet  Take 25 mg by mouth Daily.             Cyanocobalamin (B-12) 1000 MCG capsule  Take 1 tablet by mouth Daily.             cyclobenzaprine (FLEXERIL) 10 MG tablet  Take 1 tablet by mouth 3 (Three) Times a Day As Needed for muscle spasms.             diphenhydrAMINE (BENADRYL) 25 mg capsule  Take 25 mg by mouth Every 6 (Six) Hours As Needed.             enalapril (VASOTEC) 5 MG tablet  Take 5 mg by mouth Daily.             gabapentin (NEURONTIN) 600 MG tablet  take 1 tablet by mouth twice a day may repeat 1 to 2 tablets if needed             ibuprofen (ADVIL,MOTRIN) 800 MG tablet  take 1 tablet by mouth every 8 hours if needed for pain              ipratropium-albuterol (COMBIVENT RESPIMAT)  MCG/ACT inhaler  Combivent Respimat  MCG/ACT Inhalation Aerosol Solution; Patient Sig: Combivent Respimat  MCG/ACT Inhalation Aerosol Solution INHALE 1 PUFF 4 TIMES DAILY (MAXIMUM OF 6 PUFFS IN 24 HOURS); 1; 5; 07-Jul-2014; Active             metFORMIN (GLUCOPHAGE) 500 MG tablet  Take  by mouth 2 (two) times a day.             mometasone (NASONEX) 50 MCG/ACT nasal spray  into each nostril daily.             nicotine (NICODERM CQ) 21 MG/24HR patch  Place 1 patch on the skin Daily.             oxyCODONE-acetaminophen (ENDOCET)  MG per tablet  1 po q 4 hrs prn severe pain (up to 6 per day).             oxyCODONE-acetaminophen (PERCOCET)  MG per tablet  Take 1 tablet by mouth Every 4 (Four) Hours As Needed for Moderate Pain (4-6).             Polyethylene Glycol 3350 granules  Take  by mouth.             raNITIdine (ZANTAC) 150 MG tablet  take 1 tablet by mouth twice a day             simvastatin (ZOCOR) 20 MG tablet  Take 20 mg by mouth Every Night.                 Discharge Diet:     Activity at Discharge:     Follow-up Appointments  Future Appointments  Date Time Provider Department Center   4/19/2017 10:00 AM MD PALMA Arana None         Test Results Pending at Discharge       Diana Mason PA-C  03/15/17  8:26 AM
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
## 2017-03-15 NOTE — PLAN OF CARE
Problem: Patient Care Overview (Adult)  Goal: Plan of Care Review  Outcome: Outcome(s) achieved Date Met:  03/15/17    03/15/17 1200   Coping/Psychosocial Response Interventions   Plan Of Care Reviewed With patient;family   Outcome Evaluation   Outcome Summary/Follow up Plan OT carlos complete. Pt educated in back precauitons to [facility] with self care and mobility. Pt ambulated in cho independently. Pt to be d/c'd home with home health         Problem: Inpatient Occupational Therapy  Goal: Patient Education Goal STG- OT  Outcome: Outcome(s) achieved Date Met:  03/15/17    03/15/17 1200   Patient Education OT STG   Patient Education OT STG, Date Established 03/15/17   Patient Education OT STG, Time to Achieve 1 day   Patient Education OT STG, Education Type home safety;precautions per surgeon;posture/body mechanics   Patient Education OT STG Outcome goal met
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
## 2017-03-15 NOTE — DISCHARGE INSTR - ACTIVITY
As instructed to you by     Avoid heavy lifting over 15lb  Avoid bending and twisiting  Bend with knees  Limit sitting to 20-30 min intervals  You may shower, avoid baths    Do not drive for 2-3 weeks or until cleared by Dr. Dill    Check your incision daily

## 2017-03-15 NOTE — PLAN OF CARE
Problem: Patient Care Overview (Adult)  Goal: Plan of Care Review  Outcome: Ongoing (interventions implemented as appropriate)    03/15/17 0607   Coping/Psychosocial Response Interventions   Plan Of Care Reviewed With patient;spouse   Patient Care Overview   Progress no change   Outcome Evaluation   Outcome Summary/Follow up Plan pt rested well this shift, continues with iv fluids and antibiotics. familyl very supportive.       Goal: Adult Individualization and Mutuality  Outcome: Ongoing (interventions implemented as appropriate)    Problem: Perioperative Period (Adult)  Goal: Signs and Symptoms of Listed Potential Problems Will be Absent or Manageable (Perioperative Period)  Outcome: Ongoing (interventions implemented as appropriate)    Problem: Laminectomy/Foraminotomy/Discectomy (Adult)  Goal: Signs and Symptoms of Listed Potential Problems Will be Absent or Manageable (Laminectomy/Foraminotomy/Discectomy)  Outcome: Ongoing (interventions implemented as appropriate)

## 2017-03-15 NOTE — CONSULTS
"Patient: Michael Bridges  Procedure(s):  L4-5, L5-S1 LAMINECTOMY   Anesthesia type: [unfilled]    Patient location: [facility] Surgical Floor  Last vitals:   Visit Vitals   • /62   • Pulse 74   • Temp 97.6 °F (36.4 °C) (Oral)   • Resp 18   • Ht 70\" (177.8 cm)   • Wt 152 lb 14.4 oz (69.4 kg)   • SpO2 94%   • BMI 21.94 kg/m2     Level of consciousness: awake, alert and oriented    Post-anesthesia pain: adequate analgesia  Airway patency: patent  Respiratory: unassisted  Cardiovascular: stable  Hydration: euvolemic    Anesthetic complications: no  "

## 2017-04-03 RX ORDER — GABAPENTIN 600 MG/1
TABLET ORAL
Qty: 120 TABLET | Refills: 1 | Status: SHIPPED | OUTPATIENT
Start: 2017-04-03 | End: 2017-06-22 | Stop reason: SDUPTHER

## 2017-04-19 ENCOUNTER — OFFICE VISIT (OUTPATIENT)
Dept: FAMILY MEDICINE CLINIC | Facility: CLINIC | Age: 62
End: 2017-04-19

## 2017-04-19 VITALS
BODY MASS INDEX: 21.47 KG/M2 | SYSTOLIC BLOOD PRESSURE: 112 MMHG | OXYGEN SATURATION: 97 % | WEIGHT: 150 LBS | HEART RATE: 60 BPM | DIASTOLIC BLOOD PRESSURE: 68 MMHG | HEIGHT: 70 IN

## 2017-04-19 DIAGNOSIS — M47.812 OSTEOARTHRITIS OF CERVICAL SPINE, UNSPECIFIED SPINAL OSTEOARTHRITIS COMPLICATION STATUS: ICD-10-CM

## 2017-04-19 DIAGNOSIS — G47.09 OTHER INSOMNIA: ICD-10-CM

## 2017-04-19 DIAGNOSIS — M48.061 SPINAL STENOSIS OF LUMBAR REGION: ICD-10-CM

## 2017-04-19 DIAGNOSIS — J43.8 OTHER EMPHYSEMA (HCC): ICD-10-CM

## 2017-04-19 DIAGNOSIS — M50.30 DEGENERATION OF INTERVERTEBRAL DISC OF CERVICAL REGION: ICD-10-CM

## 2017-04-19 DIAGNOSIS — G89.4 CHRONIC PAIN SYNDROME: ICD-10-CM

## 2017-04-19 DIAGNOSIS — M48.02 SPINAL STENOSIS OF CERVICAL REGION: ICD-10-CM

## 2017-04-19 DIAGNOSIS — F17.200 TOBACCO DEPENDENCE SYNDROME: ICD-10-CM

## 2017-04-19 DIAGNOSIS — F17.200 TOBACCO USE DISORDER: ICD-10-CM

## 2017-04-19 DIAGNOSIS — M51.36 DEGENERATION OF INTERVERTEBRAL DISC OF LUMBAR REGION: ICD-10-CM

## 2017-04-19 DIAGNOSIS — E11.9 WELL CONTROLLED DIABETES MELLITUS (HCC): Primary | ICD-10-CM

## 2017-04-19 LAB — GLUCOSE BLDC GLUCOMTR-MCNC: 102 MG/DL (ref 70–130)

## 2017-04-19 PROCEDURE — 99406 BEHAV CHNG SMOKING 3-10 MIN: CPT | Performed by: FAMILY MEDICINE

## 2017-04-19 PROCEDURE — 82962 GLUCOSE BLOOD TEST: CPT | Performed by: FAMILY MEDICINE

## 2017-04-19 PROCEDURE — 99214 OFFICE O/P EST MOD 30 MIN: CPT | Performed by: FAMILY MEDICINE

## 2017-04-19 RX ORDER — MIRTAZAPINE 30 MG/1
30 TABLET, FILM COATED ORAL NIGHTLY
Qty: 30 TABLET | Refills: 2 | Status: SHIPPED | OUTPATIENT
Start: 2017-04-19 | End: 2023-03-15

## 2017-04-19 RX ORDER — OXYCODONE AND ACETAMINOPHEN 10; 325 MG/1; MG/1
TABLET ORAL
Qty: 180 TABLET | Refills: 0 | Status: SHIPPED | OUTPATIENT
Start: 2017-04-19 | End: 2017-05-18 | Stop reason: SDUPTHER

## 2017-04-19 RX ORDER — NICOTINE 21 MG/24HR
1 PATCH, TRANSDERMAL 24 HOURS TRANSDERMAL EVERY 24 HOURS
Qty: 28 PATCH | Refills: 2 | Status: SHIPPED | OUTPATIENT
Start: 2017-04-19 | End: 2017-06-19

## 2017-04-19 NOTE — PROGRESS NOTES
"Subjective   Michael Bridges is a 62 y.o. male.     History of Present Illness  Chronic Pain (Follow-Up): The patient is being seen for follow-up of chronic neck pain, chronic back pain and C and L spine DDD/DJD with stenosis. Has had persistently worsened neck and lower back pain. Particularly mentions lateral right lower leg from \"knee down\" which feels \"like it's on fire\". Usual meds do not relieve this pain. Impairs his sleep. Also aggravated by attempts at activity. Denies any other new focal neuro symptoms. Continues to feel his legs are weakening, falls frequently. Uses cane, walker.  Has undergone C spine surgery. Also has just undergone L spine surgery per Dr. Dill. He has f/u apt 4/27/17.  Interval symptoms: stable headache, stable neck pain, worsened back pain, denies abdominal pain, denies pelvic pain, stable upper extremity pain, worsened lower extremity pain and worsened decreased range of motion.   Associated symptoms: weakness, numbness, anxiety, irritability, difficulty concentrating, sleep disturbance and decreased appetite. Medications include short-acting opioid analgesics, muscle relaxants, antidepressants and anticonvulsants.   Medications: He denies medication side effects except fatigue.     His main concern is that he is not sleeping well. Elavil not helping sleep or headaches. Poor sleep has been a chronic problem.     COPD: Patient complains of dry int cough, fatigue. Symptoms began several years ago. Symptoms fatigue, SOA does worsen with exertion. Sputum is clear in small amounts. Fever has been no present. Patient uses 2 pillows at night. Patient can walk few feet before resting but more so due to pain than SOA. Patient currently is not on home oxygen therapy. Respiratory history: COPD     Diabetes Mellitus Type II, Follow-up: Patient here for follow-up of Type 2 diabetes mellitus. Current symptoms/problems include none and have been stable.   Known diabetic complications: " "cardiovascular disease  Cardiovascular risk factors: as above  Current diabetic medications include oral agent (monotherapy): metformin (generic).   Eye exam current (within one year): no  Weight trend: stable  Prior visit with dietician: yes -   Current diet: in general, an \"unhealthy\" diet  Current exercise: none  Current monitoring regimen: home blood tests - few times weekly  Home blood sugar records: at goal  Any episodes of hypoglycemia? No  Is He on ACE inhibitor or angiotensin II receptor blocker? Yes enalapril (Vasotec)      Smoking Cessation (Brief): The patient is being seen for clinic follow-up for tobacco cessation assistance. The patient smokes cigarettes. Tobacco use began 50+ year(s) ago. The patient has historically smoked 1/2 to 2 ppd. The patient reports a 50+ pack year history. The patient has high interest in quitting. He has tried to quit several times. Patient perceived smoking benefits include stress management. Patient recognizes that smoking cessation benefits include improved health and improved breathing. Symptoms: smoking addiction, nicotine craving, cough, dyspnea on exertion, headaches and dry mouth. Current treatment includes nicotine patches. He is ready to trying decreasing dose. The patient has reduced tobacco use substantially and down to 2 per day. Pertinent medical history: chronic obstructive pulmonary disease, recurrent respiratory infections and hypertension. Family history: nicotine addiction and lung cancer. Pertinent social history: excessive stress. He has quit previously with nicotine replacement. Failed wellbutrin.     The following portions of the patient's history were reviewed and updated as appropriate: allergies, current medications, past family history, past medical history, past social history, past surgical history and problem list.    Review of Systems   Constitutional: Positive for fatigue. Negative for chills, fever and unexpected weight change.   HENT: " Negative for congestion, mouth sores, nosebleeds, rhinorrhea, sore throat and trouble swallowing.    Eyes: Negative.    Respiratory: Positive for cough (dry, intermittent, chronic). Negative for shortness of breath and wheezing.    Cardiovascular: Negative.  Negative for chest pain, palpitations and leg swelling.   Gastrointestinal: Negative.    Endocrine: Negative.    Genitourinary: Negative.    Musculoskeletal: Positive for arthralgias, back pain, gait problem, myalgias, neck pain and neck stiffness.   Skin: Negative for rash and wound.   Neurological: Positive for tremors, weakness, numbness and headaches. Negative for dizziness, seizures, syncope and speech difficulty.   Hematological: Negative for adenopathy. Does not bruise/bleed easily.   Psychiatric/Behavioral: Positive for sleep disturbance. Negative for confusion and dysphoric mood. The patient is nervous/anxious.        Objective    Vitals:    04/19/17 1001   BP: 112/68   Pulse: 60   SpO2: 97%     Body mass index is 21.52 kg/(m^2).  Last 2 weights    04/19/17  1001   Weight: 150 lb (68 kg)       Physical Exam   Constitutional: He is oriented to person, place, and time. He appears well-developed. He is cooperative. He does not appear ill. He appears distressed (due to pain).   Appears thin, older than stated age   HENT:   Mouth/Throat: Mucous membranes are normal. Mucous membranes are not dry. Abnormal dentition.   Eyes: Conjunctivae and lids are normal.   Neck:   Chronic hoarseness noted   Cardiovascular: Normal rate, regular rhythm, normal heart sounds and intact distal pulses.    Pulmonary/Chest: Effort normal. He has decreased breath sounds. He has no wheezes. He has no rhonchi. He has no rales.   Musculoskeletal:        Cervical back: He exhibits decreased range of motion, tenderness, bony tenderness, deformity and spasm.        Lumbar back: He exhibits decreased range of motion, tenderness, bony tenderness, deformity and spasm.        Back:      Michael had a diabetic foot exam performed today.   During the foot exam he had a monofilament test performed (normal).    Vascular Status -  His exam exhibits no right foot edema. His exam exhibits no left foot edema.   Skin Integrity  -  His right foot skin is not intact. He has right foot onychomycosis and callous right foot.  He hasno right foot ulcer.   Michael's left foot skin is not intact. He has left foot onychomycosis and callous left foot. He has no left foot ulcer..  Neurological: He is alert and oriented to person, place, and time. He displays atrophy (lower extremities). A sensory deficit (hyperesthesia lateral left lower leg) is present. No cranial nerve deficit. Gait (antalgic) abnormal.   Weakness bilateral lower extremities   Skin: Skin is warm and dry. No bruising and no rash noted.   Psychiatric: He has a normal mood and affect. His behavior is normal. Cognition and memory are normal.   Nursing note and vitals reviewed.      Assessment/Plan   Michael was seen today for med refill, leg pain, headache and insomnia.    Diagnoses and all orders for this visit:    Well controlled diabetes mellitus  -     Comprehensive Metabolic Panel  -     Hemoglobin A1c    Chronic pain syndrome  -     Comprehensive Metabolic Panel  -     oxyCODONE-acetaminophen (ENDOCET)  MG per tablet; 1 po q 4 hrs prn severe pain (up to 6 per day).    Other emphysema  -     CBC (No Diff)  -     Comprehensive Metabolic Panel    Other insomnia  -     mirtazapine (REMERON) 30 MG tablet; Take 1 tablet by mouth Every Night.    Tobacco dependence syndrome    Tobacco use disorder    Osteoarthritis of cervical spine, unspecified spinal osteoarthritis complication status  -     oxyCODONE-acetaminophen (ENDOCET)  MG per tablet; 1 po q 4 hrs prn severe pain (up to 6 per day).    Degeneration of intervertebral disc of cervical region  -     oxyCODONE-acetaminophen (ENDOCET)  MG per tablet; 1 po q 4 hrs prn severe pain (up to 6  "per day).    Degeneration of intervertebral disc of lumbar region  -     oxyCODONE-acetaminophen (ENDOCET)  MG per tablet; 1 po q 4 hrs prn severe pain (up to 6 per day).    Spinal stenosis of lumbar region  -     oxyCODONE-acetaminophen (ENDOCET)  MG per tablet; 1 po q 4 hrs prn severe pain (up to 6 per day).    Spinal stenosis of cervical region  -     oxyCODONE-acetaminophen (ENDOCET)  MG per tablet; 1 po q 4 hrs prn severe pain (up to 6 per day).    Other orders  -     nicotine (NICODERM CQ) 14 MG/24HR patch; Place 1 patch on the skin Daily.    Multiple chronic stable conditions.  I have reviewed risks/benefits and potential side effects of various treatment options for insomnia. Pt voices understanding and wishes to proceed with trial of remeron. This may also benefit mood, appetite.  Smoking cessation advised. Pt voiced understanding of health risks of cont' smoking.  Risks/benefits and potential side effects of various smoking cessation treatment options reviewed with patient. Smoking cessation support options also reviewed with patient. \"Beat the Pack\" brochure provided and reviewed with patient. Patient voiced understanding and wishes to continue with Nicotine patches. A total of 5 minutes dedicated to smoking cessation counseling during this visit.  F/U with Dr. Dill as scheduled. He is encouraged to contact that office and inquire as to normal amount of wound swelling.  F/U with me in 2 months, sooner as needed.  F/U CT chest in 4 months.  Pt advised to eat a heart healthy diet and get regular aerobic exercise.  Diabetic eye exam advised yearly.  Pt is aware of reasons to seek emergent care.  STORM report reviewed and scanned into chart.  Last STORM date 4/20/17.  As part of patient's treatment plan I am prescribing a controlled substance.  The patient has been made aware of the appropriate use of such medications, including potential risk of somnolence, limited ability to drive and/or " work safely, and potential for dependence and/or overdose.  It has also been made clear that these medications are for use by this patient only, without concomitant use of alcohol or other substances, unless prescribed.  History and physical exam exhibit continued safe and appropriate use of controlled substance.  Patient was encouraged to keep me informed of any acute changes, lack of improvement, or any new concerning symptoms.  Patient voiced understanding of all instructions and denied further questions.

## 2017-04-20 LAB
ALBUMIN SERPL-MCNC: 4.3 G/DL (ref 3.5–5)
ALBUMIN/GLOB SERPL: 1.5 G/DL (ref 1–2)
ALP SERPL-CCNC: 93 U/L (ref 38–126)
ALT SERPL-CCNC: 29 U/L (ref 13–69)
AST SERPL-CCNC: 28 U/L (ref 15–46)
BILIRUB SERPL-MCNC: 1.1 MG/DL (ref 0.2–1.3)
BUN SERPL-MCNC: 10 MG/DL (ref 7–20)
BUN/CREAT SERPL: 11.1 (ref 6.3–21.9)
CALCIUM SERPL-MCNC: 9.8 MG/DL (ref 8.4–10.2)
CHLORIDE SERPL-SCNC: 101 MMOL/L (ref 98–107)
CO2 SERPL-SCNC: 28 MMOL/L (ref 26–30)
CREAT SERPL-MCNC: 0.9 MG/DL (ref 0.6–1.3)
ERYTHROCYTE [DISTWIDTH] IN BLOOD BY AUTOMATED COUNT: 14.1 % (ref 11.5–14.5)
GLOBULIN SER CALC-MCNC: 2.9 GM/DL
GLUCOSE SERPL-MCNC: 90 MG/DL (ref 74–98)
HBA1C MFR BLD: 5.8 %
HCT VFR BLD AUTO: 51.5 % (ref 42–52)
HGB BLD-MCNC: 16.9 G/DL (ref 14–18)
MCH RBC QN AUTO: 30.2 PG (ref 27–31)
MCHC RBC AUTO-ENTMCNC: 32.8 G/DL (ref 30–37)
MCV RBC AUTO: 92 FL (ref 80–94)
PLATELET # BLD AUTO: 278 10*3/MM3 (ref 130–400)
POTASSIUM SERPL-SCNC: 5.2 MMOL/L (ref 3.5–5.1)
PROT SERPL-MCNC: 7.2 G/DL (ref 6.3–8.2)
RBC # BLD AUTO: 5.6 10*6/MM3 (ref 4.7–6.1)
SODIUM SERPL-SCNC: 139 MMOL/L (ref 137–145)
WBC # BLD AUTO: 8.27 10*3/MM3 (ref 4.8–10.8)

## 2017-05-18 ENCOUNTER — TELEPHONE (OUTPATIENT)
Dept: FAMILY MEDICINE CLINIC | Facility: CLINIC | Age: 62
End: 2017-05-18

## 2017-05-18 ENCOUNTER — OFFICE VISIT (OUTPATIENT)
Dept: FAMILY MEDICINE CLINIC | Facility: CLINIC | Age: 62
End: 2017-05-18

## 2017-05-18 VITALS
SYSTOLIC BLOOD PRESSURE: 110 MMHG | WEIGHT: 152 LBS | BODY MASS INDEX: 21.76 KG/M2 | DIASTOLIC BLOOD PRESSURE: 70 MMHG | HEIGHT: 70 IN | OXYGEN SATURATION: 97 % | HEART RATE: 68 BPM

## 2017-05-18 DIAGNOSIS — G89.29 CHRONIC NONINTRACTABLE HEADACHE, UNSPECIFIED HEADACHE TYPE: Primary | ICD-10-CM

## 2017-05-18 DIAGNOSIS — M48.02 SPINAL STENOSIS OF CERVICAL REGION: ICD-10-CM

## 2017-05-18 DIAGNOSIS — M51.36 DEGENERATION OF INTERVERTEBRAL DISC OF LUMBAR REGION: ICD-10-CM

## 2017-05-18 DIAGNOSIS — M48.061 SPINAL STENOSIS OF LUMBAR REGION: ICD-10-CM

## 2017-05-18 DIAGNOSIS — E11.9 DIABETES MELLITUS, STABLE (HCC): ICD-10-CM

## 2017-05-18 DIAGNOSIS — R51.9 CHRONIC NONINTRACTABLE HEADACHE, UNSPECIFIED HEADACHE TYPE: Primary | ICD-10-CM

## 2017-05-18 DIAGNOSIS — M50.30 DEGENERATION OF INTERVERTEBRAL DISC OF CERVICAL REGION: ICD-10-CM

## 2017-05-18 DIAGNOSIS — M47.812 OSTEOARTHRITIS OF CERVICAL SPINE, UNSPECIFIED SPINAL OSTEOARTHRITIS COMPLICATION STATUS: ICD-10-CM

## 2017-05-18 DIAGNOSIS — G89.4 CHRONIC PAIN SYNDROME: ICD-10-CM

## 2017-05-18 LAB — GLUCOSE BLDC GLUCOMTR-MCNC: 129 MG/DL (ref 70–130)

## 2017-05-18 PROCEDURE — 99214 OFFICE O/P EST MOD 30 MIN: CPT | Performed by: FAMILY MEDICINE

## 2017-05-18 PROCEDURE — 82962 GLUCOSE BLOOD TEST: CPT | Performed by: FAMILY MEDICINE

## 2017-05-18 RX ORDER — OXYCODONE AND ACETAMINOPHEN 10; 325 MG/1; MG/1
TABLET ORAL
Qty: 180 TABLET | Refills: 0 | Status: SHIPPED | OUTPATIENT
Start: 2017-05-18 | End: 2017-06-19 | Stop reason: SDUPTHER

## 2017-05-18 RX ORDER — BUTALBITAL, ASPIRIN, AND CAFFEINE 50; 325; 40 MG/1; MG/1; MG/1
1 CAPSULE ORAL EVERY 8 HOURS PRN
Qty: 20 CAPSULE | Refills: 0 | OUTPATIENT
Start: 2017-05-18 | End: 2017-06-19 | Stop reason: SINTOL

## 2017-05-18 RX ORDER — BUTALBITAL, ACETAMINOPHEN AND CAFFEINE 50; 325; 40 MG/1; MG/1; MG/1
1-2 TABLET ORAL EVERY 6 HOURS PRN
Qty: 20 TABLET | Refills: 0 | Status: SHIPPED | OUTPATIENT
Start: 2017-05-18 | End: 2017-05-18

## 2017-06-13 ENCOUNTER — TELEPHONE (OUTPATIENT)
Dept: FAMILY MEDICINE CLINIC | Facility: CLINIC | Age: 62
End: 2017-06-13

## 2017-06-14 ENCOUNTER — TELEPHONE (OUTPATIENT)
Dept: FAMILY MEDICINE CLINIC | Facility: CLINIC | Age: 62
End: 2017-06-14

## 2017-06-14 NOTE — TELEPHONE ENCOUNTER
Please inform pt his pill count was off as many as 6-12 pills. He should take as rx'd which means no more than 6 per day. He may do better with long-acting medication. Does he wish to be referred to pain mgnt?

## 2017-06-19 ENCOUNTER — OFFICE VISIT (OUTPATIENT)
Dept: FAMILY MEDICINE CLINIC | Facility: CLINIC | Age: 62
End: 2017-06-19

## 2017-06-19 VITALS
DIASTOLIC BLOOD PRESSURE: 60 MMHG | HEIGHT: 70 IN | SYSTOLIC BLOOD PRESSURE: 112 MMHG | OXYGEN SATURATION: 97 % | HEART RATE: 70 BPM | BODY MASS INDEX: 21.62 KG/M2 | WEIGHT: 151 LBS

## 2017-06-19 DIAGNOSIS — E11.9 DIABETES MELLITUS, STABLE (HCC): ICD-10-CM

## 2017-06-19 DIAGNOSIS — M51.36 DEGENERATION OF INTERVERTEBRAL DISC OF LUMBAR REGION: ICD-10-CM

## 2017-06-19 DIAGNOSIS — G89.4 CHRONIC PAIN SYNDROME: Primary | ICD-10-CM

## 2017-06-19 DIAGNOSIS — M48.061 SPINAL STENOSIS OF LUMBAR REGION: ICD-10-CM

## 2017-06-19 DIAGNOSIS — R91.1 NODULE OF RIGHT LUNG: ICD-10-CM

## 2017-06-19 DIAGNOSIS — M50.30 DEGENERATION OF INTERVERTEBRAL DISC OF CERVICAL REGION: ICD-10-CM

## 2017-06-19 DIAGNOSIS — M47.812 OSTEOARTHRITIS OF CERVICAL SPINE, UNSPECIFIED SPINAL OSTEOARTHRITIS COMPLICATION STATUS: ICD-10-CM

## 2017-06-19 DIAGNOSIS — J43.8 OTHER EMPHYSEMA (HCC): ICD-10-CM

## 2017-06-19 DIAGNOSIS — J30.9 ATOPIC RHINITIS: ICD-10-CM

## 2017-06-19 DIAGNOSIS — M48.02 SPINAL STENOSIS OF CERVICAL REGION: ICD-10-CM

## 2017-06-19 LAB — GLUCOSE BLDC GLUCOMTR-MCNC: 107 MG/DL (ref 70–130)

## 2017-06-19 PROCEDURE — 82962 GLUCOSE BLOOD TEST: CPT | Performed by: FAMILY MEDICINE

## 2017-06-19 PROCEDURE — 99214 OFFICE O/P EST MOD 30 MIN: CPT | Performed by: FAMILY MEDICINE

## 2017-06-19 RX ORDER — FLUNISOLIDE 0.25 MG/ML
2 SOLUTION NASAL DAILY
Qty: 1 BOTTLE | Refills: 5 | Status: SHIPPED | OUTPATIENT
Start: 2017-06-19 | End: 2020-08-31 | Stop reason: SDUPTHER

## 2017-06-19 RX ORDER — POLYETHYLENE GLYCOL 3350
GRANULES (GRAM) MISCELLANEOUS
Qty: 1 BOTTLE | Refills: 5 | Status: SHIPPED | OUTPATIENT
Start: 2017-06-19 | End: 2017-06-19 | Stop reason: SDUPTHER

## 2017-06-19 RX ORDER — POLYETHYLENE GLYCOL 3350
GRANULES (GRAM) MISCELLANEOUS
Qty: 1 BOTTLE | Refills: 5 | Status: SHIPPED | OUTPATIENT
Start: 2017-06-19 | End: 2022-11-22

## 2017-06-19 RX ORDER — OXYCODONE AND ACETAMINOPHEN 10; 325 MG/1; MG/1
TABLET ORAL
Qty: 180 TABLET | Refills: 0 | Status: SHIPPED | OUTPATIENT
Start: 2017-06-19 | End: 2017-07-19 | Stop reason: SDUPTHER

## 2017-06-19 NOTE — PROGRESS NOTES
"Subjective   Michael Bridges is a 62 y.o. male.     History of Present Illness  Chronic Pain (Follow-Up): The patient is being seen for follow-up of chronic neck pain, chronic back pain and C and L spine DDD/DJD with stenosis. Has had persistently worsened neck and lower back pain. Particularly mentions lateral right lower leg from \"knee down\" which feels \"like it's on fire\". Usual meds do not relieve this pain. Impairs his sleep. Also aggravated by attempts at activity. Denies any other new focal neuro symptoms. Continues to feel his legs are weakening, falls frequently. Uses cane, walker.  Has undergone C spine surgery. Also has undergone L spine surgery per Dr. Dill. He has had f/u apt recently with xrays; no new concerns.  Interval symptoms: stable headache, stable neck pain, stable back pain, denies abdominal pain, denies pelvic pain, stable upper extremity pain, worsened lower extremity pain and worsened decreased range of motion, worsened generalized weakness.  Associated symptoms: weakness, numbness, anxiety, irritability, difficulty concentrating, sleep disturbance and decreased appetite. Medications include short-acting opioid analgesics, muscle relaxants, antidepressants and anticonvulsants.   Medications: He denies medication side effects except fatigue.   He requests refill of miralax for mgt of constipation.      Diabetes Mellitus Type II, Follow-up: Patient here for follow-up of Type 2 diabetes mellitus. Current symptoms/problems include none and have been stable.   Known diabetic complications: cardiovascular disease  Cardiovascular risk factors: as above  Current diabetic medications include oral agent (monotherapy): metformin (generic).   Eye exam current (within one year): no  Weight trend: stable  Prior visit with dietician: yes -   Current diet: in general, an \"unhealthy\" diet  Current exercise: none  Current monitoring regimen: home blood tests - few times weekly  Home blood sugar " "records: at goal  Any episodes of hypoglycemia? No  Is He on ACE inhibitor or angiotensin II receptor blocker? Yes enalapril (Vasotec)    Since last visit he reports increased seasonal/environmental allergy symptoms including nasal congestion. He needs refill of nasal spray. Seems worst when exposed to mown grass.    Also reports last headache medication rx'd caused him to feel nauseated and have urinary retention, therefore he dc'd the fiornal. In general he feels \"more run down\", \"legs are weaker\".  He denies CP, increase cough, palpitations, syncope.    He would like to continue smoking cessation efforts and decreased to lowest dose nicotine patch.    The following portions of the patient's history were reviewed and updated as appropriate: allergies, current medications, past family history, past medical history, past social history, past surgical history and problem list.    Review of Systems   Constitutional: Positive for fatigue. Negative for chills, fever and unexpected weight change.   HENT: Negative for congestion, mouth sores, nosebleeds, rhinorrhea, sore throat and trouble swallowing.    Eyes: Negative.    Respiratory: Positive for cough (dry, intermittent, chronic). Negative for shortness of breath and wheezing.    Cardiovascular: Negative.  Negative for chest pain, palpitations and leg swelling.   Gastrointestinal: Positive for constipation.   Endocrine: Negative.    Genitourinary: Negative.    Musculoskeletal: Positive for arthralgias, back pain, gait problem, myalgias, neck pain and neck stiffness.   Skin: Negative for rash and wound.   Neurological: Positive for tremors, weakness, numbness and headaches. Negative for dizziness, seizures, syncope and speech difficulty.   Hematological: Negative for adenopathy. Does not bruise/bleed easily.   Psychiatric/Behavioral: Positive for sleep disturbance. Negative for confusion and dysphoric mood. The patient is nervous/anxious.        Objective    Vitals:    " 06/19/17 1107   BP: 112/60   Pulse: 70   SpO2: 97%     Body mass index is 21.67 kg/(m^2).  Last 2 weights    06/19/17  1107   Weight: 151 lb (68.5 kg)       Physical Exam   Constitutional: He is oriented to person, place, and time. He appears well-developed. He appears cachectic. He is cooperative. He does not appear ill. No distress.   HENT:   Head: Normocephalic and atraumatic.   Mouth/Throat: Mucous membranes are normal. Mucous membranes are not dry.   Eyes: Conjunctivae and lids are normal.   Neck: Normal carotid pulses present. No thyroid mass and no thyromegaly present.   Chronic hoarseness noted   Cardiovascular: Normal rate, regular rhythm and intact distal pulses.    Pulmonary/Chest: Effort normal. No respiratory distress. He has decreased breath sounds. He has no wheezes. He has no rhonchi. He has no rales.   Musculoskeletal:        Cervical back: He exhibits decreased range of motion, bony tenderness and spasm. He exhibits no swelling.        Lumbar back: He exhibits decreased range of motion, bony tenderness, deformity (loss of normal lordosis) and spasm.   At baseline       Vascular Status -  His exam exhibits no right foot edema. His exam exhibits no left foot edema.  Lymphadenopathy:     He has no cervical adenopathy.   Neurological: He is alert and oriented to person, place, and time. Gait (antalgic, stooping) abnormal.   Generally weak and poorly conditioned   Skin: Skin is warm and dry. No bruising and no rash noted.   Psychiatric: He has a normal mood and affect. His behavior is normal. Cognition and memory are normal.   Nursing note and vitals reviewed.      Assessment/Plan   Michael was seen today for extremity weakness and neck pain.    Diagnoses and all orders for this visit:    Chronic pain syndrome  -     oxyCODONE-acetaminophen (ENDOCET)  MG per tablet; 1 po q 4 hrs prn severe pain (up to 6 per day).    Nodule of right lung    Other emphysema    Atopic rhinitis    Osteoarthritis of  cervical spine, unspecified spinal osteoarthritis complication status  -     oxyCODONE-acetaminophen (ENDOCET)  MG per tablet; 1 po q 4 hrs prn severe pain (up to 6 per day).    Degeneration of intervertebral disc of cervical region  -     oxyCODONE-acetaminophen (ENDOCET)  MG per tablet; 1 po q 4 hrs prn severe pain (up to 6 per day).    Degeneration of intervertebral disc of lumbar region  -     oxyCODONE-acetaminophen (ENDOCET)  MG per tablet; 1 po q 4 hrs prn severe pain (up to 6 per day).    Spinal stenosis of lumbar region  -     oxyCODONE-acetaminophen (ENDOCET)  MG per tablet; 1 po q 4 hrs prn severe pain (up to 6 per day).    Spinal stenosis of cervical region  -     oxyCODONE-acetaminophen (ENDOCET)  MG per tablet; 1 po q 4 hrs prn severe pain (up to 6 per day).    Other orders  -     flunisolide (NASALIDE) 25 MCG/ACT (0.025%) solution nasal spray; Inhale 2 sprays Daily.  -     Discontinue: Polyethylene Glycol 3350 granules; MIX 1 CAPFUL (17GM) IN 8 OUNCES OF WATER, JUICE, OR TEA AND DRINK TWICE DAILY AS NEEDED FOR CONSTIPATION  -     Discontinue: ipratropium-albuterol (COMBIVENT RESPIMAT)  MCG/ACT inhaler; Inhale 1 puff 4 (Four) Times a Day.  -     Polyethylene Glycol 3350 granules; MIX 1 CAPFUL (17GM) IN 8 OUNCES OF WATER, JUICE, OR TEA AND DRINK TWICE DAILY AS NEEDED FOR CONSTIPATION  -     ipratropium-albuterol (COMBIVENT RESPIMAT)  MCG/ACT inhaler; Inhale 1 puff 4 (Four) Times a Day.  -     nicotine (NICODERM CQ) 7 MG/24HR patch; Place 1 patch on the skin Daily.     Multiple chronic stable conditions.  F/U with Dr. Dill as scheduled.   Avoid allergens; restart routine allergy meds.  F/U with me in 1-2 months, sooner as needed.  He will need f/u CT lung to monitor lung nodule in August 2017.  Pt advised to eat a heart healthy diet and get regular aerobic exercise.  Diabetic eye exam advised yearly.  Smoking cessation advised.  Pt voiced understanding of health  risks of cont' smoking.  STORM report reviewed and scanned into chart. Last STORM date 4/20/17.  As part of patient's treatment plan I am prescribing a controlled substance. The patient has been made aware of the appropriate use of such medications, including potential risk of somnolence, limited ability to drive and/or work safely, and potential for dependence and/or overdose. It has also been made clear that these medications are for use by this patient only, without concomitant use of alcohol or other substances, unless prescribed.  History and physical exam exhibit continued safe and appropriate use of controlled substance.  Patient was encouraged to keep me informed of any acute changes, lack of improvement, or any new concerning symptoms.  Pt is aware of reasons to seek emergent care.  Patient voiced understanding of all instructions and denied further questions.

## 2017-06-22 RX ORDER — GABAPENTIN 600 MG/1
TABLET ORAL
Qty: 120 TABLET | Refills: 1 | Status: SHIPPED | OUTPATIENT
Start: 2017-06-22 | End: 2017-08-03 | Stop reason: SDUPTHER

## 2017-07-19 ENCOUNTER — TELEPHONE (OUTPATIENT)
Dept: FAMILY MEDICINE CLINIC | Facility: CLINIC | Age: 62
End: 2017-07-19

## 2017-07-19 DIAGNOSIS — M48.061 SPINAL STENOSIS OF LUMBAR REGION: ICD-10-CM

## 2017-07-19 DIAGNOSIS — M47.812 OSTEOARTHRITIS OF CERVICAL SPINE, UNSPECIFIED SPINAL OSTEOARTHRITIS COMPLICATION STATUS: ICD-10-CM

## 2017-07-19 DIAGNOSIS — M51.36 DEGENERATION OF INTERVERTEBRAL DISC OF LUMBAR REGION: ICD-10-CM

## 2017-07-19 DIAGNOSIS — M48.02 SPINAL STENOSIS OF CERVICAL REGION: ICD-10-CM

## 2017-07-19 DIAGNOSIS — G89.4 CHRONIC PAIN SYNDROME: ICD-10-CM

## 2017-07-19 DIAGNOSIS — M50.30 DEGENERATION OF INTERVERTEBRAL DISC OF CERVICAL REGION: ICD-10-CM

## 2017-07-19 RX ORDER — OXYCODONE AND ACETAMINOPHEN 10; 325 MG/1; MG/1
TABLET ORAL
Qty: 180 TABLET | Refills: 0 | Status: SHIPPED | OUTPATIENT
Start: 2017-07-19 | End: 2017-08-18 | Stop reason: SDUPTHER

## 2017-08-01 ENCOUNTER — TELEPHONE (OUTPATIENT)
Dept: FAMILY MEDICINE CLINIC | Facility: CLINIC | Age: 62
End: 2017-08-01

## 2017-08-03 RX ORDER — GABAPENTIN 600 MG/1
600 TABLET ORAL 2 TIMES DAILY
Qty: 120 TABLET | Refills: 1 | OUTPATIENT
Start: 2017-08-03 | End: 2017-09-18 | Stop reason: SDUPTHER

## 2017-08-18 ENCOUNTER — OFFICE VISIT (OUTPATIENT)
Dept: FAMILY MEDICINE CLINIC | Facility: CLINIC | Age: 62
End: 2017-08-18

## 2017-08-18 VITALS
HEART RATE: 64 BPM | BODY MASS INDEX: 21.47 KG/M2 | OXYGEN SATURATION: 97 % | SYSTOLIC BLOOD PRESSURE: 128 MMHG | WEIGHT: 150 LBS | HEIGHT: 70 IN | DIASTOLIC BLOOD PRESSURE: 76 MMHG

## 2017-08-18 DIAGNOSIS — M79.2 NEUROPATHIC PAIN, LEG, LEFT: ICD-10-CM

## 2017-08-18 DIAGNOSIS — R91.1 NODULE OF RIGHT LUNG: ICD-10-CM

## 2017-08-18 DIAGNOSIS — M47.812 OSTEOARTHRITIS OF CERVICAL SPINE, UNSPECIFIED SPINAL OSTEOARTHRITIS COMPLICATION STATUS: ICD-10-CM

## 2017-08-18 DIAGNOSIS — R91.8 ABNORMAL CT SCAN, LUNG: ICD-10-CM

## 2017-08-18 DIAGNOSIS — E78.49 OTHER HYPERLIPIDEMIA: ICD-10-CM

## 2017-08-18 DIAGNOSIS — M51.36 DEGENERATION OF INTERVERTEBRAL DISC OF LUMBAR REGION: ICD-10-CM

## 2017-08-18 DIAGNOSIS — M48.02 SPINAL STENOSIS OF CERVICAL REGION: ICD-10-CM

## 2017-08-18 DIAGNOSIS — M48.061 SPINAL STENOSIS OF LUMBAR REGION: ICD-10-CM

## 2017-08-18 DIAGNOSIS — M50.30 DEGENERATION OF INTERVERTEBRAL DISC OF CERVICAL REGION: ICD-10-CM

## 2017-08-18 DIAGNOSIS — E53.8 COBALAMIN DEFICIENCY: ICD-10-CM

## 2017-08-18 DIAGNOSIS — E55.9 VITAMIN D DEFICIENCY: ICD-10-CM

## 2017-08-18 DIAGNOSIS — E11.9 WELL CONTROLLED DIABETES MELLITUS (HCC): ICD-10-CM

## 2017-08-18 DIAGNOSIS — J43.8 OTHER EMPHYSEMA (HCC): ICD-10-CM

## 2017-08-18 DIAGNOSIS — G89.4 CHRONIC PAIN SYNDROME: Primary | ICD-10-CM

## 2017-08-18 DIAGNOSIS — Z79.891 CHRONIC PRESCRIPTION OPIATE USE: ICD-10-CM

## 2017-08-18 DIAGNOSIS — R51.9 CHRONIC DAILY HEADACHE: ICD-10-CM

## 2017-08-18 DIAGNOSIS — I25.10 CHRONIC CORONARY ARTERY DISEASE: ICD-10-CM

## 2017-08-18 PROCEDURE — 99214 OFFICE O/P EST MOD 30 MIN: CPT | Performed by: FAMILY MEDICINE

## 2017-08-18 PROCEDURE — 82962 GLUCOSE BLOOD TEST: CPT | Performed by: FAMILY MEDICINE

## 2017-08-18 RX ORDER — OXYCODONE AND ACETAMINOPHEN 10; 325 MG/1; MG/1
TABLET ORAL
Qty: 180 TABLET | Refills: 0 | Status: SHIPPED | OUTPATIENT
Start: 2017-08-18 | End: 2017-09-18 | Stop reason: SDUPTHER

## 2017-08-18 NOTE — PROGRESS NOTES
"Subjective   Michael Bridges is a 62 y.o. male.     History of Present Illness  Chronic Pain (Follow-Up): The patient is being seen for follow-up of chronic neck pain, chronic back pain and C and L spine DDD/DJD with stenosis. Has had persistently worsened neck and lower back pain. Particularly mentions lateral right lower leg from \"knee down\" which feels \"like it's on fire\". Usual meds do not relieve this pain. Impairs his sleep. Also aggravated by attempts at activity. Denies any other new focal neuro symptoms. Continues to feel his legs are weakening, falls frequently. Uses cane, walker.  Has undergone C spine surgery. Also has undergone L spine surgery per Dr. Dill.   Interval symptoms: worsening/persistent headaches, stable neck pain, stable back pain, denies abdominal pain, denies pelvic pain, stable upper extremity pain, worsened lower extremity pain and worsened decreased range of motion, worsened generalized weakness.  Associated symptoms: weakness, numbness, anxiety, irritability, difficulty concentrating, sleep disturbance and decreased appetite. Medications include short-acting opioid analgesics, muscle relaxants, antidepressants and anticonvulsants.   Medications: He denies medication side effects except fatigue.   He uses miralax for mgt of constipation.      Diabetes Mellitus Type II, Follow-up: Patient here for follow-up of Type 2 diabetes mellitus. Current symptoms/problems include none and have been stable.   Known diabetic complications: cardiovascular disease  Cardiovascular risk factors: as above  Current diabetic medications include oral agent (monotherapy): metformin (generic).   Eye exam current (within one year): no  Weight trend: stable  Prior visit with dietician: yes -   Current diet: in general, an \"unhealthy\" diet  Current exercise: none  Current monitoring regimen: home blood tests - few times weekly  Home blood sugar records: at goal  Any episodes of hypoglycemia? No  Is He on " ACE inhibitor or angiotensin II receptor blocker? Yes enalapril (Vasotec)     Since last visit he reports increased seasonal/environmental allergy symptoms including nasal congestion. He needs refill of nasal spray. Seems worst when exposed to mown grass.     He main concern today is that of persistent headaches. He describes them as throbbing/squeezing, severe, generally frontal and occipital. No other assoc'd focal neuro symptoms other than dizziness. They impair his sleep but do not awaken him from sleep. He has a headache daily.    Chronic conditions include emphysema,CAD, controlled NIDDM.    The following portions of the patient's history were reviewed and updated as appropriate: allergies, current medications, past family history, past medical history, past social history, past surgical history and problem list.    Review of Systems   Constitutional: Positive for fatigue. Negative for chills, fever and unexpected weight change.   HENT: Negative for congestion, mouth sores, nosebleeds, rhinorrhea, sore throat and trouble swallowing.    Eyes: Negative.    Respiratory: Positive for cough (dry, intermittent, chronic). Negative for shortness of breath and wheezing.    Cardiovascular: Negative.  Negative for chest pain, palpitations and leg swelling.   Gastrointestinal: Positive for constipation.   Endocrine: Negative.    Genitourinary: Negative.    Musculoskeletal: Positive for arthralgias, back pain, gait problem, myalgias, neck pain and neck stiffness.   Skin: Negative for rash and wound.   Neurological: Positive for tremors, weakness, numbness and headaches. Negative for dizziness, seizures, syncope and speech difficulty.   Hematological: Negative for adenopathy. Does not bruise/bleed easily.   Psychiatric/Behavioral: Positive for sleep disturbance. Negative for confusion and dysphoric mood. The patient is nervous/anxious.        Objective    Vitals:    08/18/17 1033   BP: 128/76   Pulse: 64   SpO2: 97%      Body mass index is 21.52 kg/(m^2).  Last 2 weights    08/18/17  1033   Weight: 150 lb (68 kg)       Physical Exam   Constitutional: He is oriented to person, place, and time. He appears well-developed. He appears cachectic. He is cooperative. He does not appear ill. No distress.   Appears more fatigued today   HENT:   Head: Normocephalic and atraumatic.   Mouth/Throat: Mucous membranes are normal. Mucous membranes are not dry.   Eyes: Conjunctivae, EOM and lids are normal.   Neck: Normal carotid pulses present. No thyroid mass and no thyromegaly present.   Chronic hoarseness noted   Cardiovascular: Normal rate, regular rhythm and intact distal pulses.    Pulmonary/Chest: Effort normal. No respiratory distress. He has decreased breath sounds. He has no wheezes. He has no rhonchi. He has no rales.   Musculoskeletal:        Cervical back: He exhibits decreased range of motion, bony tenderness and spasm. He exhibits no swelling.        Lumbar back: He exhibits decreased range of motion, bony tenderness, deformity (loss of normal lordosis) and spasm.   At baseline       Vascular Status -  His exam exhibits no right foot edema. His exam exhibits no left foot edema.  Neurological: He is oriented to person, place, and time. No cranial nerve deficit. Gait (antalgic, stooping, uses cane, holds on to others/stable objects to balance) abnormal.   Generally weak and poorly conditioned   Skin: Skin is warm and dry. No bruising and no rash noted.   Psychiatric: He has a normal mood and affect. His behavior is normal. Cognition and memory are normal.   Nursing note and vitals reviewed.      Assessment/Plan   Michael was seen today for headache and back pain.    Diagnoses and all orders for this visit:    Chronic pain syndrome  -     oxyCODONE-acetaminophen (ENDOCET)  MG per tablet; 1 po q 4 hrs prn severe pain (up to 6 per day).    Chronic coronary artery disease  -     CBC (No Diff)  -     TSH    Other hyperlipidemia  -      Comprehensive Metabolic Panel  -     Lipid Panel  -     TSH    Nodule of right lung  -     CT Chest Without Contrast; Future    Other emphysema  -     CBC (No Diff)    Cobalamin deficiency  -     CBC (No Diff)  -     Vitamin B12    Vitamin D deficiency  -     Vitamin D 25 Hydroxy    Well controlled diabetes mellitus  -     Comprehensive Metabolic Panel  -     Hemoglobin A1c  -     Microalbumin / Creatinine Urine Ratio  -     TSH    Osteoarthritis of cervical spine, unspecified spinal osteoarthritis complication status  -     oxyCODONE-acetaminophen (ENDOCET)  MG per tablet; 1 po q 4 hrs prn severe pain (up to 6 per day).    Degeneration of intervertebral disc of cervical region  -     oxyCODONE-acetaminophen (ENDOCET)  MG per tablet; 1 po q 4 hrs prn severe pain (up to 6 per day).    Degeneration of intervertebral disc of lumbar region  -     oxyCODONE-acetaminophen (ENDOCET)  MG per tablet; 1 po q 4 hrs prn severe pain (up to 6 per day).    Spinal stenosis of lumbar region  -     oxyCODONE-acetaminophen (ENDOCET)  MG per tablet; 1 po q 4 hrs prn severe pain (up to 6 per day).    Spinal stenosis of cervical region  -     oxyCODONE-acetaminophen (ENDOCET)  MG per tablet; 1 po q 4 hrs prn severe pain (up to 6 per day).    Chronic prescription opiate use  -     naloxone (NARCAN) 4 MG/0.1ML nasal spray; 1 spray into each nostril As Needed (suspected overdose). May repeat with 2nd device in opposite nostril if minimal response in 2-3 minutes    Chronic daily headache  -     Ambulatory Referral to Neurology  -     divalproex (DEPAKOTE) 250 MG DR tablet; Take 1 tablet by mouth Every Night.    Neuropathic pain, leg, left  -     Ambulatory Referral to Neurology    Abnormal CT scan, lung  -     CT Chest Without Contrast; Future    Multiple chronic stable conditions. Many could contribute to his fatigue as well as medication side effects, chronic pain, etc. Lab eval of chronic problems as  above.    I have reviewed risks/benefits and potential side effects of various treatment options for chronic headache. Pt voices understanding and wishes to proceed with trial of low dose depakote.    Refer to neuro for further eval of headaches and persistent severe LLE pain no improved s/p L spine surgery.    F/U with me in 2-3 months, sooner as needed.  I will contact patient regarding test results and provide instructions regarding any necessary changes in plan of care.    F/u CT lung to monitor lung nodule in August 2017.    Pt advised to eat a heart healthy diet and get regular aerobic exercise.  Diabetic eye exam advised yearly.  Smoking cessation advised.  Pt voiced understanding of health risks of cont' smoking.    Stable chronic pain but chronically debilitated as well. Has failed non-opioid options and has poor overall prognosis in regard to functional improvement otherwise.  STORM report reviewed and scanned into chart. Last STORM date 7/27/17. Last UDS appropriate.  As part of patient's treatment plan I am prescribing a controlled substance. The patient has been made aware of the appropriate use of such medications, including potential risk of somnolence, limited ability to drive and/or work safely, and potential for dependence and/or overdose. It has also been made clear that these medications are for use by this patient only, without concomitant use of alcohol or other substances, unless prescribed.  History and physical exam exhibit continued safe and appropriate use of controlled substance.  Narcan rx provided; education provided to pt.    Patient was encouraged to keep me informed of any acute changes, lack of improvement, or any new concerning symptoms.  Pt is aware of reasons to seek emergent care.  Patient voiced understanding of all instructions and denied further questions.

## 2017-08-19 LAB
25(OH)D3+25(OH)D2 SERPL-MCNC: 51.4 NG/ML
ALBUMIN SERPL-MCNC: 4.2 G/DL (ref 3.5–5)
ALBUMIN/CREAT UR: 18.1 MG/G CREAT (ref 0–30)
ALBUMIN/GLOB SERPL: 1.8 G/DL (ref 1–2)
ALP SERPL-CCNC: 62 U/L (ref 38–126)
ALT SERPL-CCNC: 33 U/L (ref 13–69)
AST SERPL-CCNC: 27 U/L (ref 15–46)
BILIRUB SERPL-MCNC: 0.8 MG/DL (ref 0.2–1.3)
BUN SERPL-MCNC: 16 MG/DL (ref 7–20)
BUN/CREAT SERPL: 17.8 (ref 6.3–21.9)
CALCIUM SERPL-MCNC: 9.7 MG/DL (ref 8.4–10.2)
CHLORIDE SERPL-SCNC: 103 MMOL/L (ref 98–107)
CHOLEST SERPL-MCNC: 171 MG/DL (ref 0–199)
CO2 SERPL-SCNC: 25 MMOL/L (ref 26–30)
CREAT SERPL-MCNC: 0.9 MG/DL (ref 0.6–1.3)
CREAT UR-MCNC: 220.7 MG/DL
ERYTHROCYTE [DISTWIDTH] IN BLOOD BY AUTOMATED COUNT: 15.2 % (ref 11.5–14.5)
GLOBULIN SER CALC-MCNC: 2.4 GM/DL
GLUCOSE SERPL-MCNC: 104 MG/DL (ref 74–98)
HBA1C MFR BLD: 5.9 %
HCT VFR BLD AUTO: 49.2 % (ref 42–52)
HDLC SERPL-MCNC: 55 MG/DL (ref 40–60)
HGB BLD-MCNC: 16.2 G/DL (ref 14–18)
LDLC SERPL CALC-MCNC: 94 MG/DL (ref 0–99)
MCH RBC QN AUTO: 29.6 PG (ref 27–31)
MCHC RBC AUTO-ENTMCNC: 32.9 G/DL (ref 30–37)
MCV RBC AUTO: 89.9 FL (ref 80–94)
MICROALBUMIN UR-MCNC: 40 UG/ML
PLATELET # BLD AUTO: 181 10*3/MM3 (ref 130–400)
POTASSIUM SERPL-SCNC: 5 MMOL/L (ref 3.5–5.1)
PROT SERPL-MCNC: 6.6 G/DL (ref 6.3–8.2)
RBC # BLD AUTO: 5.47 10*6/MM3 (ref 4.7–6.1)
SODIUM SERPL-SCNC: 139 MMOL/L (ref 137–145)
TRIGL SERPL-MCNC: 111 MG/DL
TSH SERPL DL<=0.005 MIU/L-ACNC: 1.37 MIU/ML (ref 0.47–4.68)
VIT B12 SERPL-MCNC: 269 PG/ML (ref 239–931)
VLDLC SERPL CALC-MCNC: 22.2 MG/DL
WBC # BLD AUTO: 7.21 10*3/MM3 (ref 4.8–10.8)

## 2017-08-19 RX ORDER — NALOXONE HYDROCHLORIDE 4 MG/.1ML
1 SPRAY NASAL AS NEEDED
Qty: 2 EACH | Refills: 2 | Status: SHIPPED | OUTPATIENT
Start: 2017-08-19

## 2017-08-19 RX ORDER — DIVALPROEX SODIUM 250 MG/1
250 TABLET, DELAYED RELEASE ORAL NIGHTLY
Qty: 30 TABLET | Refills: 2 | Status: SHIPPED | OUTPATIENT
Start: 2017-08-19 | End: 2017-09-18 | Stop reason: SDUPTHER

## 2017-08-21 LAB — GLUCOSE BLDC GLUCOMTR-MCNC: 110 MG/DL (ref 70–130)

## 2017-08-21 RX ORDER — RANITIDINE 150 MG/1
TABLET ORAL
Qty: 60 TABLET | Refills: 5 | Status: SHIPPED | OUTPATIENT
Start: 2017-08-21 | End: 2017-10-23

## 2017-08-28 ENCOUNTER — HOSPITAL ENCOUNTER (OUTPATIENT)
Dept: CT IMAGING | Facility: HOSPITAL | Age: 62
Discharge: HOME | End: 2017-08-28
Admitting: FAMILY MEDICINE

## 2017-08-28 DIAGNOSIS — R91.8 ABNORMAL CT SCAN, LUNG: ICD-10-CM

## 2017-08-28 DIAGNOSIS — R91.1 NODULE OF RIGHT LUNG: ICD-10-CM

## 2017-08-28 PROCEDURE — 71250 CT THORAX DX C-: CPT

## 2017-09-12 ENCOUNTER — TELEPHONE (OUTPATIENT)
Dept: FAMILY MEDICINE CLINIC | Facility: CLINIC | Age: 62
End: 2017-09-12

## 2017-09-12 NOTE — TELEPHONE ENCOUNTER
----- Message from Diana Priest MD sent at 8/21/2017  8:05 AM EDT -----  Please inform pt his labs are stable other than his B12 which is quite low again. Would recommend he restart shots once weekly x 6 weeks.      9/12/17  -------  Patient informed and voiced understanding.

## 2017-09-12 NOTE — TELEPHONE ENCOUNTER
----- Message from Diana Priest MD sent at 8/30/2017  5:54 PM EDT -----  Please inform pt/spouse that his f/u CT showed a stable lung nodule. Recommendation is f/u scan in 1 year.      9/12/17  ---------  Patient informed and voiced understanding.

## 2017-09-18 ENCOUNTER — OFFICE VISIT (OUTPATIENT)
Dept: FAMILY MEDICINE CLINIC | Facility: CLINIC | Age: 62
End: 2017-09-18

## 2017-09-18 VITALS
BODY MASS INDEX: 21.33 KG/M2 | HEIGHT: 70 IN | HEART RATE: 60 BPM | DIASTOLIC BLOOD PRESSURE: 70 MMHG | SYSTOLIC BLOOD PRESSURE: 110 MMHG | OXYGEN SATURATION: 97 % | WEIGHT: 149 LBS

## 2017-09-18 DIAGNOSIS — M50.30 DEGENERATION OF INTERVERTEBRAL DISC OF CERVICAL REGION: ICD-10-CM

## 2017-09-18 DIAGNOSIS — R26.9 GAIT DISTURBANCE: ICD-10-CM

## 2017-09-18 DIAGNOSIS — E53.8 B12 DEFICIENCY: ICD-10-CM

## 2017-09-18 DIAGNOSIS — R42 DIZZINESS: ICD-10-CM

## 2017-09-18 DIAGNOSIS — G89.4 CHRONIC PAIN SYNDROME: Primary | ICD-10-CM

## 2017-09-18 DIAGNOSIS — M51.36 DEGENERATION OF INTERVERTEBRAL DISC OF LUMBAR REGION: ICD-10-CM

## 2017-09-18 DIAGNOSIS — E53.8 COBALAMIN DEFICIENCY: ICD-10-CM

## 2017-09-18 DIAGNOSIS — Z23 NEED FOR INFLUENZA VACCINATION: ICD-10-CM

## 2017-09-18 DIAGNOSIS — M47.812 OSTEOARTHRITIS OF CERVICAL SPINE, UNSPECIFIED SPINAL OSTEOARTHRITIS COMPLICATION STATUS: ICD-10-CM

## 2017-09-18 DIAGNOSIS — R91.1 NODULE OF RIGHT LUNG: ICD-10-CM

## 2017-09-18 DIAGNOSIS — M48.061 SPINAL STENOSIS OF LUMBAR REGION: ICD-10-CM

## 2017-09-18 DIAGNOSIS — M48.02 SPINAL STENOSIS OF CERVICAL REGION: ICD-10-CM

## 2017-09-18 DIAGNOSIS — R51.9 CHRONIC DAILY HEADACHE: ICD-10-CM

## 2017-09-18 PROCEDURE — 90686 IIV4 VACC NO PRSV 0.5 ML IM: CPT | Performed by: FAMILY MEDICINE

## 2017-09-18 PROCEDURE — 96372 THER/PROPH/DIAG INJ SC/IM: CPT | Performed by: FAMILY MEDICINE

## 2017-09-18 PROCEDURE — 90471 IMMUNIZATION ADMIN: CPT | Performed by: FAMILY MEDICINE

## 2017-09-18 PROCEDURE — 99214 OFFICE O/P EST MOD 30 MIN: CPT | Performed by: FAMILY MEDICINE

## 2017-09-18 RX ORDER — OXYCODONE AND ACETAMINOPHEN 10; 325 MG/1; MG/1
TABLET ORAL
Qty: 180 TABLET | Refills: 0 | Status: SHIPPED | OUTPATIENT
Start: 2017-09-18 | End: 2017-10-18 | Stop reason: SDUPTHER

## 2017-09-18 RX ORDER — CYANOCOBALAMIN 1000 UG/ML
1000 INJECTION, SOLUTION INTRAMUSCULAR; SUBCUTANEOUS
Status: DISCONTINUED | OUTPATIENT
Start: 2017-09-18 | End: 2017-09-29

## 2017-09-18 RX ORDER — DIVALPROEX SODIUM 250 MG/1
TABLET, DELAYED RELEASE ORAL
Qty: 60 TABLET | Refills: 2 | Status: SHIPPED | OUTPATIENT
Start: 2017-09-18 | End: 2023-03-15

## 2017-09-18 RX ORDER — GABAPENTIN 600 MG/1
600 TABLET ORAL 3 TIMES DAILY
Qty: 90 TABLET | Refills: 1 | Status: SHIPPED | OUTPATIENT
Start: 2017-09-18 | End: 2017-10-20 | Stop reason: SDUPTHER

## 2017-09-18 RX ADMIN — CYANOCOBALAMIN 1000 MCG: 1000 INJECTION, SOLUTION INTRAMUSCULAR; SUBCUTANEOUS at 12:36

## 2017-09-18 NOTE — PROGRESS NOTES
"Subjective   Michael Ned Bridges is a 62 y.o. male.     History of Present Illness   Mr. Bridges is here today with his wife for routine f/u.  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 Has had persistently worsened neck and lower back pain. Particularly mentions lateral right lower leg from \"knee down\" which feels \"like it's on fire\". Usual meds do not relieve this pain. Impairs his sleep. Also aggravated by attempts at activity. Denies any other new focal neuro symptoms. Continues to feel his legs are weakening, falls frequently. Uses cane, walker.  Has undergone C spine surgery. Also has undergone L spine surgery per Dr. Dill both within past year.   Interval symptoms: worsening/persistent headaches, stable neck pain, stable back pain, denies abdominal pain, denies pelvic pain, stable upper extremity pain, worsened lower extremity pain and worsened decreased range of motion, worsened generalized weakness.  Associated symptoms: weakness, numbness, anxiety, irritability, difficulty concentrating, sleep disturbance and decreased appetite. Medications include short-acting opioid analgesics, muscle relaxants, antidepressants and anticonvulsants.   Medications: He denies medication side effects except fatigue.   He uses miralax for mgt of constipation.        His main concern today is that of persistent headaches. This have been a problem for several months now. He describes them as throbbing/squeezing, severe, generally frontal and occipital. Worst on left side. No other assoc'd focal neuro symptoms other than dizziness. They impair his sleep but do not awaken him from sleep. He has a headache daily. Not assoc'd with n/v. At last visit he was started on nightly depakote which he feels has helped \"somewhat\". He has upcoming apt with Dr. Wilhelm. CT head 11/2016 normal.      Chronic conditions include emphysema, CAD, " controlled NIDDM. No recent changes in status. He is taking meds as rx'd.    He has had recent CT scan lung for f/u on 8 mm RML nodule. Would like to review those results. Denies cough, hemoptysis. Weight stable. No night sweats.    The following portions of the patient's history were reviewed and updated as appropriate: allergies, current medications, past family history, past medical history, past social history, past surgical history and problem list.    Review of Systems   Constitutional: Positive for fatigue. Negative for chills, fever and unexpected weight change.   HENT: Negative for congestion, mouth sores, nosebleeds, rhinorrhea, sore throat and trouble swallowing.    Eyes: Negative.    Respiratory: Negative for cough, shortness of breath and wheezing.    Cardiovascular: Negative.  Negative for chest pain, palpitations and leg swelling.   Gastrointestinal: Positive for constipation.   Endocrine: Negative.    Genitourinary: Negative.    Musculoskeletal: Positive for arthralgias, back pain, gait problem, myalgias, neck pain and neck stiffness.   Skin: Negative for rash and wound.   Neurological: Positive for dizziness, tremors, weakness, numbness and headaches. Negative for seizures, syncope and speech difficulty.   Hematological: Negative for adenopathy. Does not bruise/bleed easily.   Psychiatric/Behavioral: Positive for sleep disturbance. Negative for confusion and dysphoric mood. The patient is nervous/anxious.        Objective    Vitals:    09/18/17 1020   BP: 110/70   Pulse: 60   SpO2: 97%     Body mass index is 21.38 kg/(m^2).  Last 2 weights    09/18/17  1020   Weight: 149 lb (67.6 kg)       Physical Exam   Constitutional: He is oriented to person, place, and time. He appears well-developed. He is cooperative. He does not appear ill. No distress.   Appears fatigued today, thin   HENT:   Head: Normocephalic and atraumatic.   Mouth/Throat: Mucous membranes are normal. Mucous membranes are not dry.    Eyes: Conjunctivae, EOM and lids are normal.   Neck: Normal carotid pulses present. No thyroid mass and no thyromegaly present.   Chronic hoarseness noted   Cardiovascular: Normal rate, regular rhythm and intact distal pulses.    Pulmonary/Chest: Effort normal. No respiratory distress. He has decreased breath sounds. He has no wheezes. He has no rhonchi. He has no rales.   Musculoskeletal:        Cervical back: He exhibits decreased range of motion, bony tenderness and spasm. He exhibits no swelling.        Lumbar back: He exhibits decreased range of motion, bony tenderness, deformity (loss of normal lordosis) and spasm.   At baseline       Vascular Status -  His exam exhibits no right foot edema. His exam exhibits no left foot edema.  Lymphadenopathy:     He has no cervical adenopathy.        Right: No supraclavicular adenopathy present.        Left: No supraclavicular adenopathy present.   Neurological: He is alert and oriented to person, place, and time. No cranial nerve deficit. Gait (antalgic, stooping, uses cane, holds on to others/stable objects to balance) abnormal.   Generally weak and poorly conditioned. Marked weakness in legs.   Skin: Skin is warm and dry. No bruising and no rash noted.   Psychiatric: His speech is normal and behavior is normal. His affect is blunt. Cognition and memory are normal.   Nursing note and vitals reviewed.    Recent Results (from the past 1008 hour(s))   CBC (No Diff)    Collection Time: 08/18/17 11:10 AM   Result Value Ref Range    WBC 7.21 4.80 - 10.80 10*3/mm3    RBC 5.47 4.70 - 6.10 10*6/mm3    Hemoglobin 16.2 14.0 - 18.0 g/dL    Hematocrit 49.2 42.0 - 52.0 %    MCV 89.9 80.0 - 94.0 fL    MCH 29.6 27.0 - 31.0 pg    MCHC 32.9 30.0 - 37.0 g/dL    RDW 15.2 (H) 11.5 - 14.5 %    Platelets 181 130 - 400 10*3/mm3   Comprehensive Metabolic Panel    Collection Time: 08/18/17 11:10 AM   Result Value Ref Range    Glucose 104 (H) 74 - 98 mg/dL    BUN 16 7 - 20 mg/dL    Creatinine  0.90 0.60 - 1.30 mg/dL    eGFR Non African Am 86 >60 mL/min/1.73    eGFR African Am 104 >60 mL/min/1.73    BUN/Creatinine Ratio 17.8 6.3 - 21.9    Sodium 139 137 - 145 mmol/L    Potassium 5.0 3.5 - 5.1 mmol/L    Chloride 103 98 - 107 mmol/L    Total CO2 25.0 (L) 26.0 - 30.0 mmol/L    Calcium 9.7 8.4 - 10.2 mg/dL    Total Protein 6.6 6.3 - 8.2 g/dL    Albumin 4.20 3.50 - 5.00 g/dL    Globulin 2.4 gm/dL    A/G Ratio 1.8 1.0 - 2.0 g/dL    Total Bilirubin 0.8 0.2 - 1.3 mg/dL    Alkaline Phosphatase 62 38 - 126 U/L    AST (SGOT) 27 15 - 46 U/L    ALT (SGPT) 33 13 - 69 U/L   Lipid Panel    Collection Time: 08/18/17 11:10 AM   Result Value Ref Range    Total Cholesterol 171 0 - 199 mg/dL    Triglycerides 111 <150 mg/dL    HDL Cholesterol 55 40 - 60 mg/dL    VLDL Cholesterol 22.2 mg/dL    LDL Cholesterol  94 0 - 99 mg/dL   Hemoglobin A1c    Collection Time: 08/18/17 11:10 AM   Result Value Ref Range    Hemoglobin A1C 5.90 %   Microalbumin / Creatinine Urine Ratio    Collection Time: 08/18/17 11:10 AM   Result Value Ref Range    Creatinine, Urine 220.7 Not Estab. mg/dL    Microalbumin, Urine 40.0 Not Estab. ug/mL    Microalbumin/Creatinine Ratio Urine 18.1 0.0 - 30.0 mg/g creat   TSH    Collection Time: 08/18/17 11:10 AM   Result Value Ref Range    TSH 1.370 0.470 - 4.680 mIU/mL   Vitamin D 25 Hydroxy    Collection Time: 08/18/17 11:10 AM   Result Value Ref Range    25 Hydroxy, Vitamin D 51.4 ng/mL   Vitamin B12    Collection Time: 08/18/17 11:10 AM   Result Value Ref Range    Vitamin B-12 269 239 - 931 pg/mL   POC Glucose Fingerstick    Collection Time: 08/21/17  4:00 PM   Result Value Ref Range    Glucose 110 70 - 130 mg/dL     CT scan lung without contrast reviewed with pt. Stable from previous; repeat 1 year.     Assessment/Plan   Michael was seen today for med refill, pain and headache.    Diagnoses and all orders for this visit:    Chronic pain syndrome  -     oxyCODONE-acetaminophen (ENDOCET)  MG per tablet; 1 po  q 4 hrs prn severe pain (up to 6 per day).    Need for influenza vaccination  -     Flu Vaccine Quad PF 3YR+    B12 deficiency  -     cyanocobalamin injection 1,000 mcg; Inject 1 mL into the shoulder, thigh, or buttocks Every 28 (Twenty-Eight) Days.    Nodule of right lung    Cobalamin deficiency    Chronic daily headache  -     divalproex (DEPAKOTE) 250 MG DR tablet; 1 po bid  -     MRI Brain Without Contrast; Future    Osteoarthritis of cervical spine, unspecified spinal osteoarthritis complication status  -     oxyCODONE-acetaminophen (ENDOCET)  MG per tablet; 1 po q 4 hrs prn severe pain (up to 6 per day).    Degeneration of intervertebral disc of cervical region  -     oxyCODONE-acetaminophen (ENDOCET)  MG per tablet; 1 po q 4 hrs prn severe pain (up to 6 per day).    Degeneration of intervertebral disc of lumbar region  -     oxyCODONE-acetaminophen (ENDOCET)  MG per tablet; 1 po q 4 hrs prn severe pain (up to 6 per day).    Spinal stenosis of lumbar region  -     oxyCODONE-acetaminophen (ENDOCET)  MG per tablet; 1 po q 4 hrs prn severe pain (up to 6 per day).    Spinal stenosis of cervical region  -     oxyCODONE-acetaminophen (ENDOCET)  MG per tablet; 1 po q 4 hrs prn severe pain (up to 6 per day).    Gait disturbance  -     MRI Brain Without Contrast; Future    Dizziness  -     MRI Brain Without Contrast; Future    Other orders  -     gabapentin (NEURONTIN) 600 MG tablet; Take 1 tablet by mouth 3 (Three) Times a Day.    Multiple chronic stable conditions. Many could contribute to his fatigue as well as medication side effects, chronic pain, etc.     His HAs have had initial response to Depakote. Recommend increase to bid as he is on shorter acting. MRI ordered. F/u with Dr. Wilhelm as scheduled for further eval of headaches and persistent severe LLE pain no improved s/p L spine surgery.     F/U with me in 2 months, sooner as needed.     F/u CT lung to monitor lung nodule in August  2018.     Pt advised to eat a heart healthy diet.  Diabetic eye exam advised yearly.  Smoking cessation advised.  Pt voiced understanding of health risks of cont' smoking.     Stable chronic pain but chronically debilitated as well. Has failed non-opioid options and has poor overall prognosis in regard to functional improvement otherwise.  STORM report reviewed and scanned into chart. Last STORM date 7/27/17. Last UDS appropriate.  As part of patient's treatment plan I am prescribing a controlled substance. The patient has been made aware of the appropriate use of such medications, including potential risk of somnolence, limited ability to drive and/or work safely, and potential for dependence and/or overdose. It has also been made clear that these medications are for use by this patient only, without concomitant use of alcohol or other substances, unless prescribed.  History and physical exam exhibit continued safe and appropriate use of controlled substance.  Narcan rx provided; education provided to pt.     Patient was encouraged to keep me informed of any acute changes, lack of improvement, or any new concerning symptoms.  Pt is aware of reasons to seek emergent care.  Patient voiced understanding of all instructions and denied further questions.

## 2017-09-26 PROBLEM — G89.29 CHRONIC BACK PAIN: Status: ACTIVE | Noted: 2017-09-26

## 2017-09-26 PROBLEM — E11.9 DIABETES MELLITUS: Status: ACTIVE | Noted: 2017-09-26

## 2017-09-26 PROBLEM — Q62.5 CONGENITAL DUPLICATION OF RENAL COLLECTING SYSTEM: Status: ACTIVE | Noted: 2017-09-26

## 2017-09-26 PROBLEM — N40.0 BPH (BENIGN PROSTATIC HYPERTROPHY): Status: ACTIVE | Noted: 2017-09-26

## 2017-09-26 PROBLEM — M54.9 CHRONIC BACK PAIN: Status: ACTIVE | Noted: 2017-09-26

## 2017-09-27 ENCOUNTER — OFFICE VISIT (OUTPATIENT)
Dept: NEUROLOGY | Facility: CLINIC | Age: 62
End: 2017-09-27

## 2017-09-27 VITALS
OXYGEN SATURATION: 99 % | SYSTOLIC BLOOD PRESSURE: 132 MMHG | DIASTOLIC BLOOD PRESSURE: 78 MMHG | HEIGHT: 70 IN | BODY MASS INDEX: 21.33 KG/M2 | HEART RATE: 47 BPM | WEIGHT: 149 LBS
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
## 2017-09-27 DIAGNOSIS — M54.50 BACK PAIN AT L4-L5 LEVEL: Primary | ICD-10-CM

## 2017-09-27 DIAGNOSIS — F43.10 PTSD (POST-TRAUMATIC STRESS DISORDER): ICD-10-CM

## 2017-09-27 DIAGNOSIS — S06.9X1A TBI (TRAUMATIC BRAIN INJURY), WITH LOSS OF CONSCIOUSNESS OF 30 MINUTES OR LESS, INITIAL ENCOUNTER: ICD-10-CM

## 2017-09-27 DIAGNOSIS — F07.81 POSTCONCUSSION SYNDROME: ICD-10-CM

## 2017-09-27 DIAGNOSIS — G57.02 COMMON PERONEAL NEUROPATHY, LEFT: ICD-10-CM

## 2017-09-27 DIAGNOSIS — G57.12 MERALGIA PARAESTHETICA, LEFT: ICD-10-CM

## 2017-09-27 PROCEDURE — 99244 OFF/OP CNSLTJ NEW/EST MOD 40: CPT | Performed by: PSYCHIATRY & NEUROLOGY

## 2017-09-27 RX ORDER — AMITRIPTYLINE HYDROCHLORIDE 25 MG/1
25 TABLET, FILM COATED ORAL NIGHTLY
Qty: 30 TABLET | Refills: 3 | Status: SHIPPED | OUTPATIENT
Start: 2017-09-27 | End: 2017-12-13
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
## 2017-09-29 ENCOUNTER — CLINICAL SUPPORT (OUTPATIENT)
Dept: FAMILY MEDICINE CLINIC | Facility: CLINIC | Age: 62
End: 2017-09-29

## 2017-09-29 DIAGNOSIS — E53.8 B12 DEFICIENCY: ICD-10-CM

## 2017-09-29 PROCEDURE — 96372 THER/PROPH/DIAG INJ SC/IM: CPT | Performed by: FAMILY MEDICINE

## 2017-09-29 RX ORDER — CEFDINIR 300 MG/1
300 CAPSULE ORAL 2 TIMES DAILY
Qty: 20 CAPSULE | Refills: 0 | Status: SHIPPED | OUTPATIENT
Start: 2017-09-29 | End: 2017-10-23

## 2017-09-29 RX ORDER — CYANOCOBALAMIN 1000 UG/ML
1000 INJECTION, SOLUTION INTRAMUSCULAR; SUBCUTANEOUS
Status: SHIPPED | OUTPATIENT
Start: 2017-09-29 | End: 2017-10-27

## 2017-09-29 RX ADMIN — CYANOCOBALAMIN 1000 MCG: 1000 INJECTION, SOLUTION INTRAMUSCULAR; SUBCUTANEOUS at 18:39

## 2017-10-06 ENCOUNTER — CLINICAL SUPPORT (OUTPATIENT)
Dept: FAMILY MEDICINE CLINIC | Facility: CLINIC | Age: 62
End: 2017-10-06

## 2017-10-06 DIAGNOSIS — E53.8 VITAMIN B12 DEFICIENCY: ICD-10-CM

## 2017-10-06 PROCEDURE — 96372 THER/PROPH/DIAG INJ SC/IM: CPT | Performed by: FAMILY MEDICINE

## 2017-10-06 RX ADMIN — CYANOCOBALAMIN 1000 MCG: 1000 INJECTION, SOLUTION INTRAMUSCULAR; SUBCUTANEOUS at 15:30

## 2017-10-09 ENCOUNTER — TELEPHONE (OUTPATIENT)
Dept: FAMILY MEDICINE CLINIC | Facility: CLINIC | Age: 62
End: 2017-10-09

## 2017-10-09 NOTE — TELEPHONE ENCOUNTER
----- Message from Diana Priest MD sent at 9/29/2017 12:57 PM EDT -----  Inform pt his recent MRI head showed no new concerns other than some signs of infection in bones behind right ear. I have sent in rx for antibiotics for him. This may or may not have any association with his headaches.    10/4/17  ----------- Patients wife informed and voiced understanding.

## 2017-10-16 ENCOUNTER — TELEPHONE (OUTPATIENT)
Dept: NEUROLOGY | Facility: CLINIC | Age: 62
End: 2017-10-16

## 2017-10-18 ENCOUNTER — TELEPHONE (OUTPATIENT)
Dept: FAMILY MEDICINE CLINIC | Facility: CLINIC | Age: 62
End: 2017-10-18

## 2017-10-18 DIAGNOSIS — M50.30 DEGENERATION OF INTERVERTEBRAL DISC OF CERVICAL REGION: ICD-10-CM

## 2017-10-18 DIAGNOSIS — M47.812 OSTEOARTHRITIS OF CERVICAL SPINE, UNSPECIFIED SPINAL OSTEOARTHRITIS COMPLICATION STATUS: ICD-10-CM

## 2017-10-18 DIAGNOSIS — M51.36 DEGENERATION OF INTERVERTEBRAL DISC OF LUMBAR REGION: ICD-10-CM

## 2017-10-18 DIAGNOSIS — G89.4 CHRONIC PAIN SYNDROME: ICD-10-CM

## 2017-10-18 DIAGNOSIS — M48.02 SPINAL STENOSIS OF CERVICAL REGION: ICD-10-CM

## 2017-10-18 RX ORDER — OXYCODONE AND ACETAMINOPHEN 10; 325 MG/1; MG/1
TABLET ORAL
Qty: 180 TABLET | Refills: 0 | Status: SHIPPED | OUTPATIENT
Start: 2017-10-18 | End: 2017-11-17 | Stop reason: SDUPTHER

## 2017-10-18 NOTE — TELEPHONE ENCOUNTER
STORM reviewed.  Rx refill authorized.  Pt to keep routine f/u apt.    UDS to be done at time of .

## 2017-10-19 ENCOUNTER — CLINICAL SUPPORT (OUTPATIENT)
Dept: FAMILY MEDICINE CLINIC | Facility: CLINIC | Age: 62
End: 2017-10-19

## 2017-10-19 DIAGNOSIS — E53.8 COBALAMIN DEFICIENCY: ICD-10-CM

## 2017-10-19 PROCEDURE — 96372 THER/PROPH/DIAG INJ SC/IM: CPT | Performed by: FAMILY MEDICINE

## 2017-10-19 RX ADMIN — CYANOCOBALAMIN 1000 MCG: 1000 INJECTION, SOLUTION INTRAMUSCULAR; SUBCUTANEOUS at 14:30

## 2017-10-23 ENCOUNTER — OFFICE VISIT (OUTPATIENT)
Dept: FAMILY MEDICINE CLINIC | Facility: CLINIC | Age: 62
End: 2017-10-23

## 2017-10-23 VITALS
BODY MASS INDEX: 20.04 KG/M2 | WEIGHT: 140 LBS | DIASTOLIC BLOOD PRESSURE: 70 MMHG | OXYGEN SATURATION: 98 % | HEART RATE: 58 BPM | HEIGHT: 70 IN | SYSTOLIC BLOOD PRESSURE: 110 MMHG

## 2017-10-23 DIAGNOSIS — M79.605 LEFT LEG PAIN: ICD-10-CM

## 2017-10-23 DIAGNOSIS — R63.4 ABNORMAL WEIGHT LOSS: ICD-10-CM

## 2017-10-23 DIAGNOSIS — R10.13 EPIGASTRIC PAIN: Primary | ICD-10-CM

## 2017-10-23 DIAGNOSIS — E53.8 COBALAMIN DEFICIENCY: ICD-10-CM

## 2017-10-23 DIAGNOSIS — R11.0 NAUSEA: ICD-10-CM

## 2017-10-23 PROCEDURE — 99214 OFFICE O/P EST MOD 30 MIN: CPT | Performed by: FAMILY MEDICINE

## 2017-10-23 RX ORDER — OMEPRAZOLE 40 MG/1
40 CAPSULE, DELAYED RELEASE ORAL DAILY
Qty: 30 CAPSULE | Refills: 5 | Status: SHIPPED | OUTPATIENT
Start: 2017-10-23 | End: 2017-12-13 | Stop reason: ALTCHOICE

## 2017-10-23 NOTE — PROGRESS NOTES
Subjective   Michael Bridges is a 62 y.o. male.     Nausea   This is a new problem. The current episode started more than 1 month ago. The problem occurs intermittently. The problem has been waxing and waning. Associated symptoms include abdominal pain (epigastric), anorexia, arthralgias, coughing (dry, intermittent, stable), fatigue, headaches, myalgias, nausea, neck pain, numbness and weakness. Pertinent negatives include no change in bowel habit, chest pain, diaphoresis, fever, rash, sore throat, urinary symptoms or vomiting. The symptoms are aggravated by eating. Treatments tried: zantac. The treatment provided no relief.   he has a known h/o B12 def. Taking IM injection intermittently. He denies  heartburn, reflux other than occasionally with dietary indiscretion.    He also continues to c/o severe left lower leg pain/burning and dysesthesia. He has been to see neurology and f/u MRI L spine with contrast as well as EMG/NCS have been ordered and schedule for next month. He continues to have left leg weakness. (Underlying h/o L-DDD, C-DDD s/p surgical intervention.)    The following portions of the patient's history were reviewed and updated as appropriate: allergies, current medications, past family history, past medical history, past social history, past surgical history and problem list.    Review of Systems   Constitutional: Positive for appetite change, fatigue and unexpected weight change. Negative for diaphoresis and fever.   HENT: Negative for mouth sores, nosebleeds, rhinorrhea, sore throat and trouble swallowing.    Eyes: Positive for visual disturbance (chronic). Negative for pain.   Respiratory: Positive for cough (dry, intermittent, stable) and shortness of breath (with exertion). Negative for wheezing.    Cardiovascular: Negative for chest pain, palpitations and leg swelling.   Gastrointestinal: Positive for abdominal pain (epigastric), anorexia, constipation (occ mild) and nausea.  Negative for blood in stool, change in bowel habit, diarrhea and vomiting.   Endocrine: Positive for cold intolerance and polydipsia.   Genitourinary: Negative for dysuria, frequency, hematuria and urgency.   Musculoskeletal: Positive for arthralgias, back pain, gait problem, myalgias, neck pain and neck stiffness.   Skin: Negative for rash and wound.   Neurological: Positive for dizziness, tremors, weakness, numbness and headaches. Negative for seizures and syncope.   Hematological: Negative for adenopathy. Does not bruise/bleed easily.   Psychiatric/Behavioral: Positive for sleep disturbance. Negative for confusion, dysphoric mood and suicidal ideas. The patient is nervous/anxious.        Objective    Vitals:    10/23/17 0857   BP: 110/70   Pulse: 58   SpO2: 98%     Body mass index is 20.09 kg/(m^2).  Last 2 weights    10/23/17  0857   Weight: 140 lb (63.5 kg)       Physical Exam   Constitutional: He is oriented to person, place, and time. He appears well-developed. He is cooperative. He does not appear ill. No distress.   Appears fatigued, thin   HENT:   Head: Normocephalic and atraumatic.   Mouth/Throat: Mucous membranes are normal. Mucous membranes are not dry.   Eyes: Conjunctivae, EOM and lids are normal.   Neck: Neck supple. Normal carotid pulses present. No thyroid mass and no thyromegaly present.   Chronic hoarseness noted   Cardiovascular: Regular rhythm, normal heart sounds and intact distal pulses.  Bradycardia present.    Pulmonary/Chest: Effort normal. No respiratory distress. He has decreased breath sounds. He has no wheezes. He has no rhonchi. He has no rales.   Abdominal: Soft. He exhibits no distension (scaphoid) and no mass. Bowel sounds are decreased. There is no hepatosplenomegaly. There is no tenderness.   Musculoskeletal:        Cervical back: He exhibits decreased range of motion, bony tenderness and spasm. He exhibits no swelling.        Lumbar back: He exhibits decreased range of motion,  bony tenderness, deformity (loss of normal lordosis) and spasm.   At baseline       Vascular Status -  His exam exhibits no right foot edema. His exam exhibits no left foot edema.  Lymphadenopathy:     He has no cervical adenopathy.        Right: No supraclavicular adenopathy present.        Left: No supraclavicular adenopathy present.   Neurological: He is alert and oriented to person, place, and time. A sensory deficit (dysesthesia in peroneal distribution on left) is present. No cranial nerve deficit. Gait (antalgic, stooping, uses walker, holds on to others/stable objects to balance) abnormal.   Generally weak and poorly conditioned. Marked weakness in legs left worse than right.    Skin: Skin is warm and dry. No bruising and no rash noted.   Psychiatric: He has a normal mood and affect. His speech is normal and behavior is normal. Thought content normal. Cognition and memory are normal.   Nursing note and vitals reviewed.    Previous CT abd/pelvis reviewed on chart.  Consult note from neurology reviewed on chart.    Assessment/Plan   iMchael was seen today for leg pain, gi problem and weight loss.    Diagnoses and all orders for this visit:    Epigastric pain  -     Amylase  -     Lipase  -     H. Pylori Antigen, Stool - Stool, Per Rectum  -     Hemoglobin & Hematocrit, Blood  -     omeprazole (priLOSEC) 40 MG capsule; Take 1 capsule by mouth Daily.    Nausea  -     Amylase  -     Lipase  -     H. Pylori Antigen, Stool - Stool, Per Rectum  -     Hemoglobin & Hematocrit, Blood  -     omeprazole (priLOSEC) 40 MG capsule; Take 1 capsule by mouth Daily.    Abnormal weight loss  -     Amylase  -     Lipase  -     H. Pylori Antigen, Stool - Stool, Per Rectum  -     Hemoglobin & Hematocrit, Blood    Left leg pain    Cobalamin deficiency  -     Vitamin B12    LLE pain in peroneal nerve distribution. He has been referred to Dr. Wilhelm and has upcoming NCS and MRI Lspine with contrast. He is encouraged to keep those apts as  scheduled.    Based on above symptoms, concerned for PUDz, H pylori or other pathology. He had normal CT abd/pelvis 11/2016. Will stop H2 blocker, change to PPI for now. [place] diet. Pending review of labs he may need f/u imaging or EGD.    I will contact patient regarding test results and provide instructions regarding any necessary changes in plan of care.  Keep f/u apt in 3 weeks, f/u sooner as needed.  Patient was encouraged to keep me informed of any acute changes, lack of improvement, or any new concerning symptoms.  Pt is aware of reasons to seek emergent care.  Patient voiced understanding of all instructions and denied further questions.

## 2017-10-24 DIAGNOSIS — R63.4 ABNORMAL WEIGHT LOSS: ICD-10-CM

## 2017-10-24 DIAGNOSIS — R10.13 EPIGASTRIC ABDOMINAL PAIN: Primary | ICD-10-CM

## 2017-10-24 LAB
AMYLASE SERPL-CCNC: 73 U/L (ref 30–110)
HCT VFR BLD AUTO: 48.3 % (ref 42–52)
HGB BLD-MCNC: 15.9 G/DL (ref 14–18)
LIPASE SERPL-CCNC: 95 U/L (ref 23–300)
VIT B12 SERPL-MCNC: 787 PG/ML (ref 239–931)

## 2017-10-24 RX ORDER — GABAPENTIN 600 MG/1
TABLET ORAL
Qty: 90 TABLET | Refills: 0 | OUTPATIENT
Start: 2017-10-24 | End: 2017-12-06 | Stop reason: SDUPTHER

## 2017-10-30 LAB — H PYLORI AG STL QL IA: NEGATIVE

## 2017-11-02 ENCOUNTER — OFFICE VISIT (OUTPATIENT)
Dept: SURGERY | Facility: CLINIC | Age: 62
End: 2017-11-02

## 2017-11-02 VITALS
OXYGEN SATURATION: 99 % | BODY MASS INDEX: 20.04 KG/M2 | HEART RATE: 60 BPM | DIASTOLIC BLOOD PRESSURE: 70 MMHG | HEIGHT: 70 IN | SYSTOLIC BLOOD PRESSURE: 120 MMHG | TEMPERATURE: 97.2 F | WEIGHT: 140 LBS

## 2017-11-02 DIAGNOSIS — Z12.11 SCREENING FOR COLON CANCER: ICD-10-CM

## 2017-11-02 DIAGNOSIS — R63.4 WEIGHT LOSS: Primary | ICD-10-CM

## 2017-11-02 PROCEDURE — 99243 OFF/OP CNSLTJ NEW/EST LOW 30: CPT | Performed by: SURGERY
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
## 2017-11-03 ENCOUNTER — TELEPHONE (OUTPATIENT)
Dept: FAMILY MEDICINE CLINIC | Facility: CLINIC | Age: 62
End: 2017-11-03

## 2017-11-03 PROBLEM — Z12.11 SCREENING FOR COLON CANCER: Status: ACTIVE | Noted: 2017-11-03

## 2017-11-03 NOTE — TELEPHONE ENCOUNTER
----- Message from Diana Priest MD sent at 10/24/2017 12:47 PM EDT -----  Inform pt his labs were normal.      11/3/17  -----------I informed [atients wife and she voiced understanding.

## 2017-11-07 ENCOUNTER — TELEPHONE (OUTPATIENT)
Dept: SURGERY | Facility: CLINIC | Age: 62
End: 2017-11-07

## 2017-11-10 ENCOUNTER — TELEPHONE (OUTPATIENT)
Dept: SURGERY | Facility: CLINIC | Age: 62
End: 2017-11-10

## 2017-11-10 NOTE — TELEPHONE ENCOUNTER
Pt did not keep appt for colonoscopy scheduled for yesterday @ , he cited transportation issues as reason, I rescheduled him for 12/06/2017, confirmed with pt and [facility] @ .

## 2017-11-15 ENCOUNTER — PROCEDURE VISIT (OUTPATIENT)
Dept: ORTHOPEDIC SURGERY | Facility: CLINIC | Age: 62
End: 2017-11-15

## 2017-11-15 DIAGNOSIS — G57.12 MERALGIA PARAESTHETICA, LEFT: ICD-10-CM

## 2017-11-15 DIAGNOSIS — G57.02 COMMON PERONEAL NEUROPATHY, LEFT: ICD-10-CM

## 2017-11-15 DIAGNOSIS — M54.50 BACK PAIN AT L4-L5 LEVEL: ICD-10-CM

## 2017-11-15 PROCEDURE — 95886 MUSC TEST DONE W/N TEST COMP: CPT | Performed by: PHYSICAL THERAPIST

## 2017-11-15 PROCEDURE — 95910 NRV CNDJ TEST 7-8 STUDIES: CPT | Performed by: PHYSICAL THERAPIST

## 2017-11-17 ENCOUNTER — OFFICE VISIT (OUTPATIENT)
Dept: FAMILY MEDICINE CLINIC | Facility: CLINIC | Age: 62
End: 2017-11-17

## 2017-11-17 VITALS
HEIGHT: 71 IN | DIASTOLIC BLOOD PRESSURE: 74 MMHG | WEIGHT: 152 LBS | SYSTOLIC BLOOD PRESSURE: 118 MMHG | OXYGEN SATURATION: 99 % | HEART RATE: 60 BPM | BODY MASS INDEX: 21.28 KG/M2

## 2017-11-17 DIAGNOSIS — M47.812 OSTEOARTHRITIS OF CERVICAL SPINE, UNSPECIFIED SPINAL OSTEOARTHRITIS COMPLICATION STATUS: ICD-10-CM

## 2017-11-17 DIAGNOSIS — G89.4 CHRONIC PAIN SYNDROME: Primary | ICD-10-CM

## 2017-11-17 DIAGNOSIS — M50.30 DEGENERATION OF INTERVERTEBRAL DISC OF CERVICAL REGION: ICD-10-CM

## 2017-11-17 DIAGNOSIS — M48.02 SPINAL STENOSIS OF CERVICAL REGION: ICD-10-CM

## 2017-11-17 DIAGNOSIS — M51.36 DEGENERATION OF INTERVERTEBRAL DISC OF LUMBAR REGION: ICD-10-CM

## 2017-11-17 PROCEDURE — 99213 OFFICE O/P EST LOW 20 MIN: CPT | Performed by: FAMILY MEDICINE

## 2017-11-17 RX ORDER — MOMETASONE FUROATE 50 UG/1
2 SPRAY, METERED NASAL DAILY
Qty: 17 G | Refills: 5 | Status: SHIPPED | OUTPATIENT
Start: 2017-11-17 | End: 2020-08-31

## 2017-11-17 RX ORDER — OXYCODONE AND ACETAMINOPHEN 10; 325 MG/1; MG/1
TABLET ORAL
Qty: 180 TABLET | Refills: 0 | Status: SHIPPED | OUTPATIENT
Start: 2017-11-17 | End: 2017-12-15 | Stop reason: SDUPTHER

## 2017-11-17 NOTE — PROGRESS NOTES
Subjective   Michael Bridges is a 62 y.o. male.     History of Present Illness  Mr. Bridges presents today with his wife for f/u on several chronic issues. He requests refill of his routine analgesics. Chronic pain due to diffuse DDD and DJD of spine. Under care of neurology, neurosurgery. Has had C spine and L spine surgery within past year. Had recent EMG/NCS which was quite abnormal. Waiting for final interpretation and recommendations from neurology. Also awaiting f/u MRI L spine. Apparently there is some trouble with insurance coverage. He denies new side effects from medications. No new concerns.    In addition he was recently referred to Dr. Bowie for further eval of heme + stool, nausea, weight loss. He will be having colonscopy soon. Will then have EGD in future.      The following portions of the patient's history were reviewed and updated as appropriate: allergies, current medications, past family history, past medical history, past social history, past surgical history and problem list.    Review of Systems   Constitutional: Positive for fatigue. Negative for chills, fever and unexpected weight change.   HENT: Negative for congestion, mouth sores, nosebleeds, rhinorrhea, sore throat and trouble swallowing.    Eyes: Negative.    Respiratory: Negative for cough, shortness of breath and wheezing.    Cardiovascular: Negative.  Negative for chest pain, palpitations and leg swelling.   Gastrointestinal: Positive for nausea. Negative for abdominal pain, constipation and vomiting.   Endocrine: Negative.    Genitourinary: Negative.    Musculoskeletal: Positive for arthralgias, back pain, gait problem, myalgias, neck pain and neck stiffness.   Skin: Negative for rash and wound.   Neurological: Positive for dizziness, tremors, weakness, numbness and headaches. Negative for seizures, syncope and speech difficulty.   Hematological: Negative for adenopathy. Does not bruise/bleed easily.    Psychiatric/Behavioral: Positive for sleep disturbance. Negative for confusion and dysphoric mood. The patient is nervous/anxious.        Objective    Vitals:    11/17/17 1033   BP: 118/74   Pulse: 60   SpO2: 99%     Body mass index is 21.2 kg/(m^2).  Last 2 weights    11/17/17  1033   Weight: 152 lb (68.9 kg)       Physical Exam   Constitutional: He is oriented to person, place, and time. He appears well-developed. He is cooperative. He does not appear ill. No distress.   Appears fatigued today, thin   HENT:   Head: Normocephalic and atraumatic.   Mouth/Throat: Mucous membranes are normal. Mucous membranes are not dry.   Eyes: Conjunctivae, EOM and lids are normal.   Neck: Normal carotid pulses present. No thyroid mass and no thyromegaly present.   Chronic hoarseness noted   Cardiovascular: Normal rate, regular rhythm, normal heart sounds and intact distal pulses.    Pulmonary/Chest: Effort normal. No respiratory distress. He has decreased breath sounds. He has no wheezes. He has no rhonchi. He has no rales.   Abdominal: Soft. He exhibits no distension. There is no tenderness.   Musculoskeletal:        Cervical back: He exhibits decreased range of motion, bony tenderness and spasm. He exhibits no swelling.        Lumbar back: He exhibits decreased range of motion, bony tenderness, deformity (loss of normal lordosis) and spasm.   At baseline       Vascular Status -  His exam exhibits no right foot edema. His exam exhibits no left foot edema.  Neurological: He is alert and oriented to person, place, and time. Gait (antalgic, stooping, uses cane, holds on to others/stable objects to balance) abnormal.   Generally weak and poorly conditioned. Marked weakness in legs.   Skin: Skin is warm and dry. No bruising and no rash noted.   Psychiatric: He has a normal mood and affect. His behavior is normal. Cognition and memory are normal.   Nursing note and vitals reviewed.    Recent Results (from the past 672 hour(s))    Amylase    Collection Time: 10/23/17  9:27 AM   Result Value Ref Range    Amylase 73 30 - 110 U/L   Lipase    Collection Time: 10/23/17  9:27 AM   Result Value Ref Range    Lipase 95 23 - 300 U/L   Vitamin B12    Collection Time: 10/23/17  9:27 AM   Result Value Ref Range    Vitamin B-12 787 239 - 931 pg/mL   Hemoglobin & Hematocrit, Blood    Collection Time: 10/23/17  9:27 AM   Result Value Ref Range    Hemoglobin 15.9 14.0 - 18.0 g/dL    Hematocrit 48.3 42.0 - 52.0 %   H. Pylori Antigen, Stool    Collection Time: 10/27/17 11:15 AM   Result Value Ref Range    H pylori Ag, Stl Negative Negative     Consult note per Dr. Bowie reviewed.    Assessment/Plan   Michael was seen today for follow-up and nausea.    Diagnoses and all orders for this visit:    Chronic pain syndrome  -     oxyCODONE-acetaminophen (ENDOCET)  MG per tablet; 1 po q 4 hrs prn severe pain (up to 6 per day).    Osteoarthritis of cervical spine, unspecified spinal osteoarthritis complication status  -     oxyCODONE-acetaminophen (ENDOCET)  MG per tablet; 1 po q 4 hrs prn severe pain (up to 6 per day).    Degeneration of intervertebral disc of cervical region  -     oxyCODONE-acetaminophen (ENDOCET)  MG per tablet; 1 po q 4 hrs prn severe pain (up to 6 per day).    Degeneration of intervertebral disc of lumbar region  -     oxyCODONE-acetaminophen (ENDOCET)  MG per tablet; 1 po q 4 hrs prn severe pain (up to 6 per day).    Spinal stenosis of cervical region  -     oxyCODONE-acetaminophen (ENDOCET)  MG per tablet; 1 po q 4 hrs prn severe pain (up to 6 per day).    Other orders  -     mometasone (NASONEX) 50 MCG/ACT nasal spray; 2 sprays into each nostril Daily.  -     ipratropium-albuterol (COMBIVENT RESPIMAT)  MCG/ACT inhaler; Inhale 1 puff 4 (Four) Times a Day.    Multiple chronic stable conditions. Many could contribute to his fatigue as well as medication side effects, chronic pain, etc.     F/U with me in  2 months, sooner as needed.        Pt advised to eat a heart healthy diet.  Diabetic eye exam advised yearly.  Smoking cessation advised.  Pt voiced understanding of health risks of cont' smoking.      Stable chronic pain but chronically debilitated as well. Has failed non-opioid options and has poor overall prognosis in regard to functional improvement otherwise.  STORM report reviewed and scanned into chart. Last STORM date 10/20/17. Last UDS appropriate.  As part of patient's treatment plan I am prescribing a controlled substance. The patient has been made aware of the appropriate use of such medications, including potential risk of somnolence, limited ability to drive and/or work safely, and potential for dependence and/or overdose. It has also been made clear that these medications are for use by this patient only, without concomitant use of alcohol or other substances, unless prescribed.  History and physical exam exhibit continued safe and appropriate use of controlled substance.  Narcan rx provided; education provided to pt.      Patient was encouraged to keep me informed of any acute changes, or any new concerning symptoms.  Pt is aware of reasons to seek emergent care.  He is encouraged to keep f/u apts with neurology, neurosurgery, and general surgery.  Patient voiced understanding of all instructions and denied further questions.

## 2017-11-21 RX ORDER — GABAPENTIN 600 MG/1
TABLET ORAL
Qty: 90 TABLET | Refills: 0 | OUTPATIENT
Start: 2017-11-21

## 2017-12-04 ENCOUNTER — TELEPHONE (OUTPATIENT)
Dept: SURGERY | Facility: CLINIC | Age: 62
End: 2017-12-04

## 2017-12-06 RX ORDER — GABAPENTIN 600 MG/1
600 TABLET ORAL 3 TIMES DAILY
Qty: 90 TABLET | Refills: 0 | OUTPATIENT
Start: 2017-12-06 | End: 2017-12-19 | Stop reason: SDUPTHER

## 2017-12-13 ENCOUNTER — OFFICE VISIT (OUTPATIENT)
Dept: NEUROLOGY | Facility: CLINIC | Age: 62
End: 2017-12-13

## 2017-12-13 VITALS
HEART RATE: 51 BPM | OXYGEN SATURATION: 98 % | BODY MASS INDEX: 21.56 KG/M2 | DIASTOLIC BLOOD PRESSURE: 60 MMHG | WEIGHT: 154 LBS | HEIGHT: 71 IN | SYSTOLIC BLOOD PRESSURE: 114 MMHG

## 2017-12-13 DIAGNOSIS — G62.9 POLYNEUROPATHY: Primary | ICD-10-CM

## 2017-12-13 DIAGNOSIS — R20.2 NUMBNESS AND TINGLING OF BOTH FEET: ICD-10-CM

## 2017-12-13 DIAGNOSIS — R26.9 GAIT DIFFICULTY: ICD-10-CM

## 2017-12-13 DIAGNOSIS — E08.42 DIABETIC POLYNEUROPATHY ASSOCIATED WITH DIABETES MELLITUS DUE TO UNDERLYING CONDITION (HCC): ICD-10-CM

## 2017-12-13 DIAGNOSIS — R20.0 NUMBNESS AND TINGLING OF BOTH FEET: ICD-10-CM

## 2017-12-13 PROCEDURE — 99214 OFFICE O/P EST MOD 30 MIN: CPT | Performed by: PSYCHIATRY & NEUROLOGY

## 2017-12-13 RX ORDER — RANITIDINE 150 MG/1
TABLET ORAL
Refills: 0 | COMMUNITY
Start: 2017-11-21 | End: 2018-03-16

## 2017-12-13 RX ORDER — AMITRIPTYLINE HYDROCHLORIDE 25 MG/1
25 TABLET, FILM COATED ORAL NIGHTLY
Qty: 60 TABLET | Refills: 3 | Status: SHIPPED | OUTPATIENT
Start: 2017-12-13 | End: 2018-03-14 | Stop reason: SDUPTHER

## 2017-12-13 RX ORDER — ATENOLOL 50 MG/1
TABLET ORAL
Refills: 0 | COMMUNITY
Start: 2017-11-23 | End: 2018-08-16 | Stop reason: DRUGHIGH
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
## 2017-12-15 ENCOUNTER — TELEPHONE (OUTPATIENT)
Dept: FAMILY MEDICINE CLINIC | Facility: CLINIC | Age: 62
End: 2017-12-15

## 2017-12-15 DIAGNOSIS — M48.02 SPINAL STENOSIS OF CERVICAL REGION: ICD-10-CM

## 2017-12-15 DIAGNOSIS — M47.812 OSTEOARTHRITIS OF CERVICAL SPINE, UNSPECIFIED SPINAL OSTEOARTHRITIS COMPLICATION STATUS: ICD-10-CM

## 2017-12-15 DIAGNOSIS — G89.4 CHRONIC PAIN SYNDROME: ICD-10-CM

## 2017-12-15 DIAGNOSIS — M51.36 DEGENERATION OF INTERVERTEBRAL DISC OF LUMBAR REGION: ICD-10-CM

## 2017-12-15 DIAGNOSIS — M50.30 DEGENERATION OF INTERVERTEBRAL DISC OF CERVICAL REGION: ICD-10-CM

## 2017-12-15 RX ORDER — OXYCODONE AND ACETAMINOPHEN 10; 325 MG/1; MG/1
TABLET ORAL
Qty: 180 TABLET | Refills: 0 | Status: SHIPPED | OUTPATIENT
Start: 2017-12-15 | End: 2018-01-17 | Stop reason: SDUPTHER

## 2017-12-19 RX ORDER — GABAPENTIN 600 MG/1
TABLET ORAL
Qty: 90 TABLET | Refills: 0 | OUTPATIENT
Start: 2017-12-19 | End: 2018-02-01 | Stop reason: SDUPTHER

## 2018-01-09 ENCOUNTER — TELEPHONE (OUTPATIENT)
Dept: FAMILY MEDICINE CLINIC | Facility: CLINIC | Age: 63
End: 2018-01-09

## 2018-01-09 NOTE — TELEPHONE ENCOUNTER
I had an anonymous call on 12/27/17from kalie joseph who was calling on patient to let us know that [facility] was selling his pain medication.  He was upset about this stating that people like him made it difficult for patients who truly needed pain medication to get them. He stated if we call him in for a pill count that he would not have his medicine, nor would it be in his system.

## 2018-01-09 NOTE — TELEPHONE ENCOUNTER
Patients wife called backed stating she forgot to tell me that Michael was in [place] with family and she thought he would be back today.  She said his phone was out of minutes but he had called her on someone else's phone and said he wouldn't be home for 3-4 more days. I informed her that he need to go to a local pharmacy and take his pain med there where they would do the pill count and call our office with the results.  Aneta said she didn't have any way to get back in touch with him because his phone is out of minutes and she didn't know what number he had called her on.    Reviewed per Dr. Diana Carbone

## 2018-01-17 ENCOUNTER — OFFICE VISIT (OUTPATIENT)
Dept: FAMILY MEDICINE CLINIC | Facility: CLINIC | Age: 63
End: 2018-01-17

## 2018-01-17 VITALS
WEIGHT: 154 LBS | OXYGEN SATURATION: 98 % | DIASTOLIC BLOOD PRESSURE: 60 MMHG | HEART RATE: 78 BPM | BODY MASS INDEX: 21.56 KG/M2 | SYSTOLIC BLOOD PRESSURE: 108 MMHG | HEIGHT: 71 IN

## 2018-01-17 DIAGNOSIS — Z23 NEED FOR TDAP VACCINATION: ICD-10-CM

## 2018-01-17 DIAGNOSIS — Z23 NEED FOR PNEUMOCOCCAL VACCINATION: ICD-10-CM

## 2018-01-17 DIAGNOSIS — M47.812 OSTEOARTHRITIS OF CERVICAL SPINE, UNSPECIFIED SPINAL OSTEOARTHRITIS COMPLICATION STATUS: ICD-10-CM

## 2018-01-17 DIAGNOSIS — E11.9 TYPE 2 DIABETES MELLITUS WITHOUT COMPLICATION, WITHOUT LONG-TERM CURRENT USE OF INSULIN (HCC): Primary | ICD-10-CM

## 2018-01-17 DIAGNOSIS — Z11.59 NEED FOR HEPATITIS C SCREENING TEST: ICD-10-CM

## 2018-01-17 DIAGNOSIS — M48.02 SPINAL STENOSIS OF CERVICAL REGION: ICD-10-CM

## 2018-01-17 DIAGNOSIS — M50.30 DEGENERATION OF INTERVERTEBRAL DISC OF CERVICAL REGION: ICD-10-CM

## 2018-01-17 DIAGNOSIS — G89.4 CHRONIC PAIN SYNDROME: ICD-10-CM

## 2018-01-17 DIAGNOSIS — M51.36 DEGENERATION OF INTERVERTEBRAL DISC OF LUMBAR REGION: ICD-10-CM

## 2018-01-17 PROCEDURE — 90472 IMMUNIZATION ADMIN EACH ADD: CPT | Performed by: FAMILY MEDICINE

## 2018-01-17 PROCEDURE — 90732 PPSV23 VACC 2 YRS+ SUBQ/IM: CPT | Performed by: FAMILY MEDICINE

## 2018-01-17 PROCEDURE — 99214 OFFICE O/P EST MOD 30 MIN: CPT | Performed by: FAMILY MEDICINE

## 2018-01-17 PROCEDURE — 90471 IMMUNIZATION ADMIN: CPT | Performed by: FAMILY MEDICINE

## 2018-01-17 PROCEDURE — 90715 TDAP VACCINE 7 YRS/> IM: CPT | Performed by: FAMILY MEDICINE

## 2018-01-17 RX ORDER — OXYCODONE AND ACETAMINOPHEN 10; 325 MG/1; MG/1
TABLET ORAL
Qty: 180 TABLET | Refills: 0 | Status: SHIPPED | OUTPATIENT
Start: 2018-01-17 | End: 2018-02-16 | Stop reason: SDUPTHER

## 2018-01-17 RX ORDER — OMEPRAZOLE 40 MG/1
CAPSULE, DELAYED RELEASE ORAL
Refills: 0 | COMMUNITY
Start: 2017-12-22 | End: 2018-04-16 | Stop reason: SDUPTHER

## 2018-01-17 NOTE — PROGRESS NOTES
"Subjective   Michael Ned Bridges is a 62 y.o. male.     History of Present Illness  Mr. Bridges presents today with his wife for f/u on several chronic issues.Mr. Bridges is here today with his wife for routine f/u.  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 Has had persistently worsened neck and lower back pain. Particularly mentions lateral right lower leg from \"knee down\" which feels \"like it's on fire\". Usual meds do not relieve this pain. Impairs his sleep. Also aggravated by attempts at activity. Denies any other new focal neuro symptoms. Continues to feel his legs are weakening, falls frequently. Uses cane, walker.  Has undergone C spine surgery. Also has undergone L spine surgery per Dr. Dill both within past 15 months.  Interval symptoms: worsening/persistent headaches, stable neck pain, stable back pain, denies abdominal pain, denies pelvic pain, stable upper extremity pain, worsened lower extremity pain and worsened decreased range of motion, worsened generalized weakness.  Associated symptoms: weakness, numbness, anxiety, irritability, difficulty concentrating, sleep disturbance and decreased appetite. Medications include short-acting opioid analgesics, muscle relaxants, antidepressants and anticonvulsants.   Medications: He denies medication side effects except fatigue.   He uses miralax for mgt of constipation. Denies fall or injury since last visit.      Other chronic conditions include emphysema, CAD, controlled NIDDM. No recent changes in status. He is taking meds as rx'd. Due for f/u labs.    He does not recall ever having gotten Tdap. Has been over 5 years since last pneumovax. UTD on prevnar 13.     He requests refill of his routine analgesics. Chronic pain due to diffuse DDD and DJD of spine. Under care of neurology, neurosurgery. Has had C spine and L spine surgery within past 15 months. " Had EMG/NCS which was quite abnormal. He denies new side effects from medications. No new concerns. He also continues to have severe left lower leg pain/burning and dysesthesia     The following portions of the patient's history were reviewed and updated as appropriate: allergies, current medications, past family history, past medical history, past social history, past surgical history and problem list.    Review of Systems   Constitutional: Positive for fatigue. Negative for chills, fever and unexpected weight change.   HENT: Negative for congestion, mouth sores, nosebleeds, rhinorrhea, sore throat and trouble swallowing.    Eyes: Negative.    Respiratory: Negative for cough, shortness of breath and wheezing.    Cardiovascular: Negative.  Negative for chest pain, palpitations and leg swelling.   Gastrointestinal: Positive for nausea. Negative for abdominal pain, constipation and vomiting.   Endocrine: Negative.    Genitourinary: Negative.    Musculoskeletal: Positive for arthralgias, back pain, gait problem, myalgias, neck pain and neck stiffness.   Skin: Negative for rash and wound.   Neurological: Positive for dizziness, tremors, weakness, numbness and headaches. Negative for seizures, syncope and speech difficulty.   Hematological: Negative for adenopathy. Does not bruise/bleed easily.   Psychiatric/Behavioral: Positive for sleep disturbance. Negative for confusion and dysphoric mood. The patient is nervous/anxious.        Objective    Vitals:    01/17/18 1044   BP: 108/60   Pulse: 78   SpO2: 98%     Body mass index is 21.48 kg/(m^2).  Last 2 weights    01/17/18  1044   Weight: 69.9 kg (154 lb)       Physical Exam   Constitutional: He is oriented to person, place, and time. He appears well-developed. He is cooperative. He does not appear ill. No distress.   Appears fatigued today, thin   HENT:   Head: Normocephalic and atraumatic.   Right Ear: Tympanic membrane, external ear and ear canal normal. Decreased  hearing is noted.   Left Ear: Tympanic membrane, external ear and ear canal normal. Decreased hearing is noted.   Nose: Nose normal.   Mouth/Throat: Oropharynx is clear and moist and mucous membranes are normal. Mucous membranes are not dry. No oral lesions.   Eyes: Conjunctivae and lids are normal.   Neck: Normal carotid pulses present. No thyroid mass and no thyromegaly present.   Chronic hoarseness noted   Cardiovascular: Normal rate, regular rhythm, normal heart sounds and intact distal pulses.    Pulmonary/Chest: Effort normal. No respiratory distress. He has decreased breath sounds. He has no wheezes. He has no rhonchi. He has no rales.   Musculoskeletal:        Cervical back: He exhibits decreased range of motion, bony tenderness and spasm. He exhibits no swelling.        Lumbar back: He exhibits decreased range of motion, bony tenderness, deformity (loss of normal lordosis) and spasm.   At baseline       Vascular Status -  His exam exhibits no right foot edema. His exam exhibits no left foot edema.  Lymphadenopathy:     He has no cervical adenopathy.   Neurological: He is alert and oriented to person, place, and time. Gait (antalgic, stooping, uses cane, holds on to others/stable objects to balance) abnormal.   Generally weak and poorly conditioned. Marked weakness in legs. + SLR bilaterally, Lt > Rt   Skin: Skin is warm and dry. No bruising and no rash noted.   Psychiatric: He has a normal mood and affect. His behavior is normal. Cognition and memory are normal.   Nursing note and vitals reviewed.      Assessment/Plan   Michael was seen today for neck pain, leg pain and headache.    Diagnoses and all orders for this visit:    Type 2 diabetes mellitus without complication, without long-term current use of insulin  -     Hemoglobin A1c  -     Comprehensive Metabolic Panel    Osteoarthritis of cervical spine, unspecified spinal osteoarthritis complication status  -     oxyCODONE-acetaminophen (ENDOCET) 10325  MG per tablet; 1 po q 4 hrs prn severe pain (up to 6 per day).    Degeneration of intervertebral disc of cervical region  -     oxyCODONE-acetaminophen (ENDOCET)  MG per tablet; 1 po q 4 hrs prn severe pain (up to 6 per day).    Degeneration of intervertebral disc of lumbar region  -     oxyCODONE-acetaminophen (ENDOCET)  MG per tablet; 1 po q 4 hrs prn severe pain (up to 6 per day).    Chronic pain syndrome  -     oxyCODONE-acetaminophen (ENDOCET)  MG per tablet; 1 po q 4 hrs prn severe pain (up to 6 per day).    Spinal stenosis of cervical region  -     oxyCODONE-acetaminophen (ENDOCET)  MG per tablet; 1 po q 4 hrs prn severe pain (up to 6 per day).    Need for hepatitis C screening test  -     Hepatitis C Antibody    Need for pneumococcal vaccination  -     Pneumococcal Polysaccharide Vaccine 23-Valent Greater Than or Equal To 1yo Subcutaneous / IM    Need for Tdap vaccination  -     Tdap Vaccine Greater Than or Equal To 6yo IM    Multiple chronic stable conditions. Many could contribute to his fatigue as well as medication side effects, chronic pain, etc.        F/U with me in 2 months, sooner as needed/instructed.  I will contact patient regarding test results and provide instructions regarding any necessary changes in plan of care.      F/u CT lung to monitor lung nodule in August 2018.      Pt advised to eat a heart healthy diet.  Diabetic eye exam advised yearly.  Smoking cessation advised.  Pt voiced understanding of health risks of cont' smoking.      Stable chronic pain but chronically debilitated as well. Has failed non-opioid options and has poor overall prognosis in regard to functional improvement otherwise.  STORM report reviewed and scanned into chart. Last STORM date 12/20/17. Last UDS appropriate.  As part of patient's treatment plan I am prescribing a controlled substance. The patient has been made aware of the appropriate use of such medications, including potential  risk of somnolence, limited ability to drive and/or work safely, and potential for dependence and/or overdose. It has also been made clear that these medications are for use by this patient only, without concomitant use of alcohol or other substances, unless prescribed.  History and physical exam exhibit continued safe and appropriate use of controlled substance.  Narcan rx has been provided; education provided to pt.      Patient was encouraged to keep me informed of any acute changes, or any new concerning symptoms.  He is encourged to f/u with specialists as scheduled.  Pt is aware of reasons to seek emergent care.  Patient voiced understanding of all instructions and denied further questions.

## 2018-01-18 LAB
ALBUMIN SERPL-MCNC: 4.2 G/DL (ref 3.5–5)
ALBUMIN/GLOB SERPL: 1.6 G/DL (ref 1–2)
ALP SERPL-CCNC: 68 U/L (ref 38–126)
ALT SERPL-CCNC: 32 U/L (ref 13–69)
AST SERPL-CCNC: 26 U/L (ref 15–46)
BILIRUB SERPL-MCNC: 0.8 MG/DL (ref 0.2–1.3)
BUN SERPL-MCNC: 11 MG/DL (ref 7–20)
BUN/CREAT SERPL: 12.2 (ref 6.3–21.9)
CALCIUM SERPL-MCNC: 9.8 MG/DL (ref 8.4–10.2)
CHLORIDE SERPL-SCNC: 105 MMOL/L (ref 98–107)
CO2 SERPL-SCNC: 28 MMOL/L (ref 26–30)
CREAT SERPL-MCNC: 0.9 MG/DL (ref 0.6–1.3)
GLOBULIN SER CALC-MCNC: 2.6 GM/DL
GLUCOSE SERPL-MCNC: 114 MG/DL (ref 74–98)
HBA1C MFR BLD: 5.7 %
HCV AB S/CO SERPL IA: <0.1 S/CO RATIO (ref 0–0.9)
POTASSIUM SERPL-SCNC: 4.8 MMOL/L (ref 3.5–5.1)
PROT SERPL-MCNC: 6.8 G/DL (ref 6.3–8.2)
SODIUM SERPL-SCNC: 141 MMOL/L (ref 137–145)

## 2018-02-01 RX ORDER — GABAPENTIN 600 MG/1
TABLET ORAL
Qty: 90 TABLET | Refills: 0 | OUTPATIENT
Start: 2018-02-01 | End: 2018-02-17 | Stop reason: SDUPTHER

## 2018-02-16 ENCOUNTER — TELEPHONE (OUTPATIENT)
Dept: FAMILY MEDICINE CLINIC | Facility: CLINIC | Age: 63
End: 2018-02-16

## 2018-02-16 DIAGNOSIS — M50.30 DEGENERATION OF INTERVERTEBRAL DISC OF CERVICAL REGION: ICD-10-CM

## 2018-02-16 DIAGNOSIS — M51.36 DEGENERATION OF INTERVERTEBRAL DISC OF LUMBAR REGION: ICD-10-CM

## 2018-02-16 DIAGNOSIS — G89.4 CHRONIC PAIN SYNDROME: ICD-10-CM

## 2018-02-16 DIAGNOSIS — M48.02 SPINAL STENOSIS OF CERVICAL REGION: ICD-10-CM

## 2018-02-16 DIAGNOSIS — M47.812 OSTEOARTHRITIS OF CERVICAL SPINE, UNSPECIFIED SPINAL OSTEOARTHRITIS COMPLICATION STATUS: ICD-10-CM

## 2018-02-16 RX ORDER — OXYCODONE AND ACETAMINOPHEN 10; 325 MG/1; MG/1
TABLET ORAL
Qty: 180 TABLET | Refills: 0 | Status: SHIPPED | OUTPATIENT
Start: 2018-02-16 | End: 2018-03-16 | Stop reason: SDUPTHER

## 2018-02-20 RX ORDER — GABAPENTIN 600 MG/1
TABLET ORAL
Qty: 90 TABLET | Refills: 0 | OUTPATIENT
Start: 2018-02-20 | End: 2018-03-16 | Stop reason: SDUPTHER

## 2018-03-09 NOTE — TELEPHONE ENCOUNTER
Pt cancelled colonoscopy, I attempted to reach him by phone to reschedule, no answer and no voice mail option, I will mail a letter to patient asking him to contact our office.

## 2018-03-14 ENCOUNTER — OFFICE VISIT (OUTPATIENT)
Dept: NEUROLOGY | Facility: CLINIC | Age: 63
End: 2018-03-14

## 2018-03-14 VITALS
WEIGHT: 154 LBS | DIASTOLIC BLOOD PRESSURE: 62 MMHG | HEIGHT: 71 IN | OXYGEN SATURATION: 98 % | SYSTOLIC BLOOD PRESSURE: 102 MMHG | HEART RATE: 58 BPM | BODY MASS INDEX: 21.56 KG/M2

## 2018-03-14 DIAGNOSIS — G62.9 POLYNEUROPATHY: Primary | ICD-10-CM

## 2018-03-14 PROCEDURE — 99213 OFFICE O/P EST LOW 20 MIN: CPT | Performed by: PSYCHIATRY & NEUROLOGY

## 2018-03-14 RX ORDER — NICOTINE POLACRILEX 2 MG/1
LOZENGE ORAL
Refills: 0 | COMMUNITY
Start: 2018-02-12 | End: 2019-12-12 | Stop reason: SDDI

## 2018-03-14 RX ORDER — AMITRIPTYLINE HYDROCHLORIDE 25 MG/1
25 TABLET, FILM COATED ORAL NIGHTLY
Qty: 60 TABLET | Refills: 3 | Status: SHIPPED | OUTPATIENT
Start: 2018-03-14 | End: 2022-01-12
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
## 2018-03-16 ENCOUNTER — OFFICE VISIT (OUTPATIENT)
Dept: FAMILY MEDICINE CLINIC | Facility: CLINIC | Age: 63
End: 2018-03-16

## 2018-03-16 VITALS
HEART RATE: 60 BPM | DIASTOLIC BLOOD PRESSURE: 74 MMHG | SYSTOLIC BLOOD PRESSURE: 104 MMHG | WEIGHT: 155 LBS | OXYGEN SATURATION: 99 % | BODY MASS INDEX: 22.19 KG/M2 | HEIGHT: 70 IN

## 2018-03-16 DIAGNOSIS — E78.49 OTHER HYPERLIPIDEMIA: ICD-10-CM

## 2018-03-16 DIAGNOSIS — J43.8 OTHER EMPHYSEMA (HCC): ICD-10-CM

## 2018-03-16 DIAGNOSIS — M79.671 RIGHT FOOT PAIN: Primary | ICD-10-CM

## 2018-03-16 DIAGNOSIS — G89.4 CHRONIC PAIN SYNDROME: ICD-10-CM

## 2018-03-16 DIAGNOSIS — M50.30 DEGENERATION OF INTERVERTEBRAL DISC OF CERVICAL REGION: ICD-10-CM

## 2018-03-16 DIAGNOSIS — M48.02 SPINAL STENOSIS OF CERVICAL REGION: ICD-10-CM

## 2018-03-16 DIAGNOSIS — M79.674 PAIN AND SWELLING OF TOE OF RIGHT FOOT: ICD-10-CM

## 2018-03-16 DIAGNOSIS — E11.9 TYPE 2 DIABETES MELLITUS WITHOUT COMPLICATION, WITHOUT LONG-TERM CURRENT USE OF INSULIN (HCC): ICD-10-CM

## 2018-03-16 DIAGNOSIS — I25.10 CHRONIC CORONARY ARTERY DISEASE: ICD-10-CM

## 2018-03-16 DIAGNOSIS — M51.36 DEGENERATION OF INTERVERTEBRAL DISC OF LUMBAR REGION: ICD-10-CM

## 2018-03-16 DIAGNOSIS — M47.812 OSTEOARTHRITIS OF CERVICAL SPINE, UNSPECIFIED SPINAL OSTEOARTHRITIS COMPLICATION STATUS: ICD-10-CM

## 2018-03-16 DIAGNOSIS — F17.200 TOBACCO DEPENDENCE SYNDROME: ICD-10-CM

## 2018-03-16 DIAGNOSIS — E53.8 COBALAMIN DEFICIENCY: ICD-10-CM

## 2018-03-16 DIAGNOSIS — M79.89 PAIN AND SWELLING OF TOE OF RIGHT FOOT: ICD-10-CM

## 2018-03-16 PROCEDURE — 99214 OFFICE O/P EST MOD 30 MIN: CPT | Performed by: FAMILY MEDICINE

## 2018-03-16 RX ORDER — GABAPENTIN 600 MG/1
600 TABLET ORAL 3 TIMES DAILY PRN
Qty: 90 TABLET | Refills: 2 | OUTPATIENT
Start: 2018-03-16 | End: 2018-06-04 | Stop reason: SDUPTHER

## 2018-03-16 RX ORDER — OXYCODONE AND ACETAMINOPHEN 10; 325 MG/1; MG/1
TABLET ORAL
Qty: 180 TABLET | Refills: 0 | Status: SHIPPED | OUTPATIENT
Start: 2018-03-16 | End: 2018-04-16 | Stop reason: SDUPTHER

## 2018-03-16 NOTE — TELEPHONE ENCOUNTER
Pt req refill on gabapentin 600 MG to [facility] at checkout from [facility] today.  Please call if any questions.

## 2018-03-16 NOTE — PROGRESS NOTES
"Subjective   Michael Ned Bridges is a 63 y.o. male.     History of Present Illness  Mr. Bridges presents today with his wife for f/u on several chronic issues.Mr. Bridges is here today with his wife for routine f/u.  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 Has had persistently worsened neck and lower back pain. Particularly mentions lateral right lower leg from \"knee down\" which feels \"like it's on fire\". Usual meds do not relieve this pain. Impairs his sleep. Also aggravated by attempts at activity. Denies any other new focal neuro symptoms. Continues to feel his legs are weakening, falls frequently. Uses cane, walker.  Has undergone C spine surgery. Also has undergone L spine surgery per Dr. Dill both within past 2 years.  Interval symptoms: worsening/persistent headaches, stable neck pain, stable back pain, denies abdominal pain, denies pelvic pain, stable upper extremity pain, worsened lower extremity pain and worsened decreased range of motion, worsened generalized weakness.  Associated symptoms: weakness, numbness, anxiety, irritability, difficulty concentrating, sleep disturbance and decreased appetite. Medications include short-acting opioid analgesics, muscle relaxants, antidepressants and anticonvulsants.   Medications: He denies medication side effects except fatigue.   He uses miralax for mgt of constipation. Denies fall or injury since last visit. Has had f/u with Dr. Wilhelm since last visit. Evening dose of elavil increased. MRI L spine was ordered but not approved by insurance. He has peroneal neuropathy and left leg weakness. He has low B12 levels. Previously received IM tx. Due for recheck. PADT reviewed and on chart.    He also describes occ swelling, redness, warmth in right foot. Denies h/o gout. Not sure of any particular triggers. No known family h/o gout.      Other chronic conditions " include emphysema, CAD, controlled NIDDM. No recent changes in status. He is taking meds as rx'd.  He is down to approx 5 cigs per day.    He has had f/u with Dr. Rico; has been scheduled for what sounds like a AAA screen.    The following portions of the patient's history were reviewed and updated as appropriate: allergies, current medications, past family history, past medical history, past social history, past surgical history and problem list.    Review of Systems   Constitutional: Positive for fatigue. Negative for chills, fever and unexpected weight change.   HENT: Negative for congestion, mouth sores, nosebleeds, rhinorrhea, sore throat and trouble swallowing.    Eyes: Negative for visual disturbance.   Respiratory: Negative for cough, shortness of breath and wheezing.    Cardiovascular: Positive for leg swelling. Negative for chest pain and palpitations.   Gastrointestinal: Positive for nausea. Negative for abdominal pain, constipation and vomiting.   Endocrine: Positive for cold intolerance.   Genitourinary: Negative for difficulty urinating.   Musculoskeletal: Positive for arthralgias, back pain, gait problem, myalgias, neck pain and neck stiffness.   Skin: Negative for rash and wound.   Neurological: Positive for dizziness, tremors, weakness, numbness and headaches. Negative for seizures, syncope and speech difficulty.   Hematological: Negative for adenopathy. Does not bruise/bleed easily.   Psychiatric/Behavioral: Positive for sleep disturbance. Negative for confusion and dysphoric mood. The patient is nervous/anxious.        Objective    Vitals:    03/16/18 0956   BP: 104/74   Pulse: 60   SpO2: 99%     Body mass index is 22.24 kg/m².  1    03/16/18  0956   Weight: 70.3 kg (155 lb)       Physical Exam   Constitutional: He is oriented to person, place, and time. He appears well-developed. He is cooperative. He does not appear ill. No distress.   Appears fatigued today, thin   HENT:   Head: Normocephalic  and atraumatic.   Right Ear: Decreased hearing is noted.   Left Ear: Decreased hearing is noted.   Mouth/Throat: Mucous membranes are normal. Mucous membranes are not dry.   Eyes: Conjunctivae and lids are normal.   Neck: Normal carotid pulses present. No thyroid mass and no thyromegaly present.   Chronic hoarseness noted   Cardiovascular: Normal rate, regular rhythm, normal heart sounds and intact distal pulses.    Pulmonary/Chest: Effort normal. No respiratory distress. He has decreased breath sounds. He has no wheezes. He has no rhonchi. He has no rales.   Musculoskeletal:        Cervical back: He exhibits decreased range of motion, bony tenderness and spasm. He exhibits no swelling.        Lumbar back: He exhibits decreased range of motion, bony tenderness, deformity (loss of normal lordosis) and spasm.   At baseline     Vascular Status -  His right foot exhibits normal right foot edema. His left foot exhibits normal left foot edema.  Lymphadenopathy:     He has no cervical adenopathy.   Neurological: He is alert and oriented to person, place, and time. Gait (antalgic, stooping, uses cane, holds on to others/stable objects to balance) abnormal.   Generally weak and poorly conditioned. Marked weakness in legs. + SLR bilaterally, Lt > Rt   Skin: Skin is warm and dry. No bruising and no rash noted.   Psychiatric: He has a normal mood and affect. His behavior is normal. Cognition and memory are normal.   Nursing note and vitals reviewed.    Lab Results   Component Value Date    GLUCOSE 89 03/07/2017    BUN 11 01/17/2018    CREATININE 0.90 01/17/2018    EGFRIFNONA 86 01/17/2018    EGFRIFAFRI 104 01/17/2018    BCR 12.2 01/17/2018    K 4.8 01/17/2018    CO2 28.0 01/17/2018    CALCIUM 9.8 01/17/2018    PROTENTOTREF 6.8 01/17/2018    ALBUMIN 4.20 01/17/2018    LABIL2 1.6 01/17/2018    AST 26 01/17/2018    ALT 32 01/17/2018     Lab Results   Component Value Date    WBC 7.21 08/18/2017    HGB 15.9 10/23/2017    HCT 48.3  10/23/2017    MCV 89.9 08/18/2017     08/18/2017     Lab Results   Component Value Date    HGBA1C 5.70 01/17/2018     Lab Results   Component Value Date    CHOL 182 06/13/2016    CHLPL 171 08/18/2017    TRIG 111 08/18/2017    HDL 55 08/18/2017    LDL 94 08/18/2017     Assessment/Plan   Michael was seen today for follow-up and pain.    Diagnoses and all orders for this visit:    Right foot pain  -     Uric Acid  -     Sedimentation Rate  -     C-reactive Protein    Osteoarthritis of cervical spine, unspecified spinal osteoarthritis complication status  -     oxyCODONE-acetaminophen (ENDOCET)  MG per tablet; 1 po q 4 hrs prn severe pain (up to 6 per day).    Degeneration of intervertebral disc of cervical region  -     oxyCODONE-acetaminophen (ENDOCET)  MG per tablet; 1 po q 4 hrs prn severe pain (up to 6 per day).    Degeneration of intervertebral disc of lumbar region  -     oxyCODONE-acetaminophen (ENDOCET)  MG per tablet; 1 po q 4 hrs prn severe pain (up to 6 per day).    Chronic pain syndrome  -     oxyCODONE-acetaminophen (ENDOCET)  MG per tablet; 1 po q 4 hrs prn severe pain (up to 6 per day).    Spinal stenosis of cervical region  -     oxyCODONE-acetaminophen (ENDOCET)  MG per tablet; 1 po q 4 hrs prn severe pain (up to 6 per day).    Pain and swelling of toe of right foot  -     Uric Acid  -     Sedimentation Rate  -     C-reactive Protein    Cobalamin deficiency  -     Vitamin B12    Chronic coronary artery disease    Other hyperlipidemia    Other emphysema    Type 2 diabetes mellitus without complication, without long-term current use of insulin    Tobacco dependence syndrome    Multiple chronic stable conditions. Many could contribute to his fatigue as well as medication side effects, chronic pain, etc.        F/U with me in 2 months, sooner as needed/instructed.  I will contact patient regarding test results and provide instructions regarding any necessary changes in  plan of care.        Pt advised to eat a heart healthy diet.  Diabetic eye exam advised yearly.  Smoking cessation advised.  Pt voiced understanding of health risks of cont' smoking.      Stable chronic pain but chronically debilitated as well. Has failed non-opioid options and has poor overall prognosis in regard to functional improvement otherwise.  We will try to get his L spine MRI approved.  STORM report reviewed and scanned into chart. Last STORM date 3/16/18. Last UDS appropriate.  As part of patient's treatment plan I am prescribing a controlled substance. The patient has been made aware of the appropriate use of such medications, including potential risk of somnolence, limited ability to drive and/or work safely, and potential for dependence and/or overdose. It has also been made clear that these medications are for use by this patient only, without concomitant use of alcohol or other substances, unless prescribed.  History and physical exam exhibit continued safe and appropriate use of controlled substance.  Narcan rx has been provided; education provided to pt.      Patient was encouraged to keep me informed of any acute changes, or any new concerning symptoms.  He is encourged to f/u with specialists as scheduled.  Pt is aware of reasons to seek emergent care.  Patient voiced understanding of all instructions and denied further questions.

## 2018-03-17 LAB
CRP SERPL-MCNC: 1.5 MG/DL (ref 0–1)
ERYTHROCYTE [SEDIMENTATION RATE] IN BLOOD BY WESTERGREN METHOD: 1 MM/HR (ref 0–15)
URATE SERPL-MCNC: 5.6 MG/DL (ref 2.5–8.5)
VIT B12 SERPL-MCNC: 370 PG/ML (ref 239–931)

## 2018-03-26 ENCOUNTER — TELEPHONE (OUTPATIENT)
Dept: FAMILY MEDICINE CLINIC | Facility: CLINIC | Age: 63
End: 2018-03-26

## 2018-04-16 ENCOUNTER — TELEPHONE (OUTPATIENT)
Dept: FAMILY MEDICINE CLINIC | Facility: CLINIC | Age: 63
End: 2018-04-16

## 2018-04-16 DIAGNOSIS — M51.36 DEGENERATION OF INTERVERTEBRAL DISC OF LUMBAR REGION: ICD-10-CM

## 2018-04-16 DIAGNOSIS — M48.02 SPINAL STENOSIS OF CERVICAL REGION: ICD-10-CM

## 2018-04-16 DIAGNOSIS — M47.812 OSTEOARTHRITIS OF CERVICAL SPINE, UNSPECIFIED SPINAL OSTEOARTHRITIS COMPLICATION STATUS: ICD-10-CM

## 2018-04-16 DIAGNOSIS — G89.4 CHRONIC PAIN SYNDROME: ICD-10-CM

## 2018-04-16 DIAGNOSIS — M50.30 DEGENERATION OF INTERVERTEBRAL DISC OF CERVICAL REGION: ICD-10-CM

## 2018-04-16 RX ORDER — OMEPRAZOLE 40 MG/1
40 CAPSULE, DELAYED RELEASE ORAL DAILY
Qty: 30 CAPSULE | Refills: 5 | Status: SHIPPED | OUTPATIENT
Start: 2018-04-16 | End: 2018-10-19 | Stop reason: SDUPTHER

## 2018-04-16 RX ORDER — OXYCODONE AND ACETAMINOPHEN 10; 325 MG/1; MG/1
TABLET ORAL
Qty: 180 TABLET | Refills: 0 | Status: SHIPPED | OUTPATIENT
Start: 2018-04-16 | End: 2018-05-16 | Stop reason: SDUPTHER

## 2018-04-16 NOTE — TELEPHONE ENCOUNTER
STORM reviewed.  Rx refill authorized.  Pt to keep routine f/u apt.    UDS to be done at time of

## 2018-04-17 ENCOUNTER — CLINICAL SUPPORT (OUTPATIENT)
Dept: FAMILY MEDICINE CLINIC | Facility: CLINIC | Age: 63
End: 2018-04-17

## 2018-04-17 DIAGNOSIS — E53.8 COBALAMIN DEFICIENCY: ICD-10-CM

## 2018-04-17 PROCEDURE — 96372 THER/PROPH/DIAG INJ SC/IM: CPT | Performed by: FAMILY MEDICINE

## 2018-04-17 RX ORDER — CYANOCOBALAMIN 1000 UG/ML
1000 INJECTION, SOLUTION INTRAMUSCULAR; SUBCUTANEOUS WEEKLY
Status: SHIPPED | OUTPATIENT
Start: 2018-04-17

## 2018-04-17 RX ADMIN — CYANOCOBALAMIN 1000 MCG: 1000 INJECTION, SOLUTION INTRAMUSCULAR; SUBCUTANEOUS at 16:37

## 2018-04-25 ENCOUNTER — TELEPHONE (OUTPATIENT)
Dept: FAMILY MEDICINE CLINIC | Facility: CLINIC | Age: 63
End: 2018-04-25

## 2018-04-27 ENCOUNTER — TELEPHONE (OUTPATIENT)
Dept: FAMILY MEDICINE CLINIC | Facility: CLINIC | Age: 63
End: 2018-04-27

## 2018-05-16 ENCOUNTER — OFFICE VISIT (OUTPATIENT)
Dept: FAMILY MEDICINE CLINIC | Facility: CLINIC | Age: 63
End: 2018-05-16

## 2018-05-16 VITALS
WEIGHT: 154 LBS | SYSTOLIC BLOOD PRESSURE: 115 MMHG | DIASTOLIC BLOOD PRESSURE: 64 MMHG | HEART RATE: 60 BPM | BODY MASS INDEX: 22.05 KG/M2 | OXYGEN SATURATION: 98 % | HEIGHT: 70 IN

## 2018-05-16 DIAGNOSIS — M50.30 DEGENERATION OF INTERVERTEBRAL DISC OF CERVICAL REGION: ICD-10-CM

## 2018-05-16 DIAGNOSIS — E11.9 TYPE 2 DIABETES MELLITUS WITHOUT COMPLICATION, WITHOUT LONG-TERM CURRENT USE OF INSULIN (HCC): ICD-10-CM

## 2018-05-16 DIAGNOSIS — G89.4 CHRONIC PAIN SYNDROME: ICD-10-CM

## 2018-05-16 DIAGNOSIS — M51.36 DEGENERATION OF INTERVERTEBRAL DISC OF LUMBAR REGION: ICD-10-CM

## 2018-05-16 DIAGNOSIS — Z51.81 MEDICATION MONITORING ENCOUNTER: ICD-10-CM

## 2018-05-16 DIAGNOSIS — M48.02 SPINAL STENOSIS OF CERVICAL REGION: ICD-10-CM

## 2018-05-16 DIAGNOSIS — M47.812 OSTEOARTHRITIS OF CERVICAL SPINE, UNSPECIFIED SPINAL OSTEOARTHRITIS COMPLICATION STATUS: ICD-10-CM

## 2018-05-16 DIAGNOSIS — E78.49 OTHER HYPERLIPIDEMIA: Primary | ICD-10-CM

## 2018-05-16 DIAGNOSIS — E53.8 COBALAMIN DEFICIENCY: ICD-10-CM

## 2018-05-16 LAB
ALBUMIN SERPL-MCNC: 3.9 G/DL (ref 3.5–5)
ALBUMIN/GLOB SERPL: 1.4 G/DL (ref 1–2)
ALP SERPL-CCNC: 65 U/L (ref 38–126)
ALT SERPL-CCNC: 26 U/L (ref 13–69)
AST SERPL-CCNC: 32 U/L (ref 15–46)
BILIRUB SERPL-MCNC: 0.7 MG/DL (ref 0.2–1.3)
BUN SERPL-MCNC: 22 MG/DL (ref 7–20)
BUN/CREAT SERPL: 24.4 (ref 6.3–21.9)
CALCIUM SERPL-MCNC: 9.3 MG/DL (ref 8.4–10.2)
CHLORIDE SERPL-SCNC: 102 MMOL/L (ref 98–107)
CHOLEST SERPL-MCNC: 171 MG/DL (ref 0–199)
CO2 SERPL-SCNC: 29 MMOL/L (ref 26–30)
CREAT SERPL-MCNC: 0.9 MG/DL (ref 0.6–1.3)
GFR SERPLBLD CREATININE-BSD FMLA CKD-EPI: 103 ML/MIN/1.73
GFR SERPLBLD CREATININE-BSD FMLA CKD-EPI: 85 ML/MIN/1.73
GLOBULIN SER CALC-MCNC: 2.7 GM/DL
GLUCOSE BLDC GLUCOMTR-MCNC: 113 MG/DL (ref 70–130)
GLUCOSE SERPL-MCNC: 91 MG/DL (ref 74–98)
HBA1C MFR BLD: 5.5 %
HDLC SERPL-MCNC: 48 MG/DL (ref 40–60)
LDLC SERPL CALC-MCNC: 101 MG/DL (ref 0–99)
POTASSIUM SERPL-SCNC: 4.7 MMOL/L (ref 3.5–5.1)
PROT SERPL-MCNC: 6.6 G/DL (ref 6.3–8.2)
SODIUM SERPL-SCNC: 139 MMOL/L (ref 137–145)
TRIGL SERPL-MCNC: 110 MG/DL
VLDLC SERPL CALC-MCNC: 22 MG/DL

## 2018-05-16 PROCEDURE — 82962 GLUCOSE BLOOD TEST: CPT | Performed by: FAMILY MEDICINE

## 2018-05-16 PROCEDURE — 99214 OFFICE O/P EST MOD 30 MIN: CPT | Performed by: FAMILY MEDICINE

## 2018-05-16 PROCEDURE — 96372 THER/PROPH/DIAG INJ SC/IM: CPT | Performed by: FAMILY MEDICINE

## 2018-05-16 RX ORDER — OXYCODONE AND ACETAMINOPHEN 10; 325 MG/1; MG/1
TABLET ORAL
Qty: 180 TABLET | Refills: 0 | Status: SHIPPED | OUTPATIENT
Start: 2018-05-16 | End: 2018-06-18 | Stop reason: SDUPTHER

## 2018-05-16 RX ADMIN — CYANOCOBALAMIN 1000 MCG: 1000 INJECTION, SOLUTION INTRAMUSCULAR; SUBCUTANEOUS at 11:01

## 2018-05-16 NOTE — PROGRESS NOTES
"Subjective   Michael Ned Bridges is a 63 y.o. male.     History of Present Illness  Mr. Bridges presents today with his wife for f/u on several chronic issues.  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 Has had persistently worsened neck and lower back pain. Particularly mentions lateral right lower leg from \"knee down\" which feels \"like it's on fire\". Usual meds do not relieve this pain. Impairs his sleep. Also aggravated by attempts at activity. Denies any other new focal neuro symptoms. Continues to feel his legs are weakening, falls frequently. Uses cane, walker.  PADT reviewed and scanned to chart.  Has undergone C spine surgery. Also has undergone L spine surgery per Dr. Dill both within past 2 years.  Interval symptoms: persistent headaches, stable neck pain, stable back pain, denies abdominal pain, denies pelvic pain, stable upper extremity pain, worsened lower extremity pain and worsened decreased range of motion, worsened generalized weakness.  Associated symptoms: weakness, numbness, anxiety, irritability, difficulty concentrating, sleep disturbance and decreased appetite. Medications include short-acting opioid analgesics, muscle relaxants, antidepressants and anticonvulsants.   Medications: He denies medication side effects except fatigue.   He uses miralax for mgt of constipation. Denies fall or injury since last visit.  He is also being followed by Dr. Wilhelm.     Has persistent B12 deficiency. Due for f/u injection.    Other chronic conditions include emphysema, CAD, controlled NIDDM. No recent changes in status. He is taking meds as rx'd.  He is down to approx 5 cigs per day. Continues to \"want to quit smoking\". He is due for routine f/u labs including FLP.      The following portions of the patient's history were reviewed and updated as appropriate: allergies, current medications, past family " history, past medical history, past social history, past surgical history and problem list.    Review of Systems   Constitutional: Positive for fatigue. Negative for chills, fever and unexpected weight change.   HENT: Negative for congestion, mouth sores, nosebleeds, rhinorrhea, sore throat and trouble swallowing.    Eyes: Negative for visual disturbance.   Respiratory: Negative for cough, shortness of breath and wheezing.    Cardiovascular: Positive for leg swelling. Negative for chest pain and palpitations.   Gastrointestinal: Positive for nausea. Negative for abdominal pain, constipation and vomiting.   Endocrine: Positive for cold intolerance.   Genitourinary: Negative for difficulty urinating.   Musculoskeletal: Positive for arthralgias, back pain, gait problem, myalgias, neck pain and neck stiffness.   Skin: Negative for rash and wound.   Neurological: Positive for dizziness, tremors, weakness, numbness and headaches. Negative for seizures, syncope and speech difficulty.   Hematological: Negative for adenopathy. Does not bruise/bleed easily.   Psychiatric/Behavioral: Positive for sleep disturbance. Negative for confusion and dysphoric mood. The patient is nervous/anxious.        Objective    Vitals:    05/16/18 0904   BP: 115/64   Pulse: 60   SpO2: 98%     Body mass index is 22.1 kg/m².  1    05/16/18  0904   Weight: 69.9 kg (154 lb)       Physical Exam   Constitutional: He is oriented to person, place, and time. He appears well-developed. He is cooperative. He does not appear ill. No distress.   Appears fatigued today, thin   HENT:   Head: Normocephalic and atraumatic.   Right Ear: Decreased hearing is noted.   Left Ear: Decreased hearing is noted.   Mouth/Throat: Mucous membranes are normal. Mucous membranes are not dry.   Eyes: Conjunctivae and lids are normal.   Neck:   Chronic hoarseness noted   Cardiovascular: Regular rhythm, normal heart sounds and intact distal pulses.  Bradycardia present.  Exam  reveals no gallop.    No murmur heard.  No peripheral edema.   Pulmonary/Chest: Effort normal. No respiratory distress. He has decreased breath sounds. He has no wheezes. He has no rhonchi. He has no rales.   Musculoskeletal:        Cervical back: He exhibits decreased range of motion, bony tenderness and spasm. He exhibits no swelling.        Lumbar back: He exhibits decreased range of motion, bony tenderness, deformity (loss of normal lordosis) and spasm.   At baseline   Neurological: He is alert and oriented to person, place, and time. Gait (antalgic, stooping, uses cane, holds on to others/stable objects to balance) abnormal.   Generally weak and poorly conditioned. Marked weakness in legs. + SLR bilaterally, Lt > Rt   Skin: Skin is warm and dry. No bruising and no rash noted.   Psychiatric: He has a normal mood and affect. His behavior is normal. Cognition and memory are normal.   Nursing note and vitals reviewed.    Recently unable to reach him for pill count as he did not have functional cell phone number and was out of town.    Also had recent inappropriately neg UDS (for med rx'd). States he does not take med while driving and was the primary  for family while out of town just prior to UDS.    Assessment/Plan   Michael was seen today for follow-up and diabetes.    Diagnoses and all orders for this visit:    Other hyperlipidemia  -     Lipid Panel    Cobalamin deficiency    Type 2 diabetes mellitus without complication, without long-term current use of insulin  -     Hemoglobin A1c  -     Comprehensive Metabolic Panel  -     POCT Glucose    Chronic pain syndrome  -     oxyCODONE-acetaminophen (ENDOCET)  MG per tablet; 1 po q 4 hrs prn severe pain (up to 6 per day).  -     Comprehensive Metabolic Panel    Osteoarthritis of cervical spine, unspecified spinal osteoarthritis complication status  -     oxyCODONE-acetaminophen (ENDOCET)  MG per tablet; 1 po q 4 hrs prn severe pain (up to 6 per  day).    Degeneration of intervertebral disc of cervical region  -     oxyCODONE-acetaminophen (ENDOCET)  MG per tablet; 1 po q 4 hrs prn severe pain (up to 6 per day).    Degeneration of intervertebral disc of lumbar region  -     oxyCODONE-acetaminophen (ENDOCET)  MG per tablet; 1 po q 4 hrs prn severe pain (up to 6 per day).    Spinal stenosis of cervical region  -     oxyCODONE-acetaminophen (ENDOCET)  MG per tablet; 1 po q 4 hrs prn severe pain (up to 6 per day).    Medication monitoring encounter  -     Comprehensive Metabolic Panel    Multiple chronic stable conditions. Many could contribute to his fatigue as well as medication side effects, chronic pain, etc.   I will contact patient regarding test results and provide instructions regarding any necessary changes in plan of care.  Pt advised to eat a heart healthy diet and get regular exercise as able; fall precautions reviewed.    IM B12 updated today.       F/U with me in 2 months, sooner as needed/instructed.      Diabetic eye exam advised yearly.  Updated colonoscopy advised. Pt declines at this time.  Smoking cessation advised.  Pt voiced understanding of health risks of cont' smoking.      Stable chronic pain but chronically debilitated as well. Has failed non-opioid options and has poor overall prognosis in regard to functional improvement otherwise.  STORM report reviewed and scanned into chart. Last STORM date 5/16/18. Last UDS inappropriate.  I have had length conversation that he is being prescribed his opioid analgesic medication and gabapentin on a short term basis as he will require closer monitoring with random UDS and pill counts. If he is not able to be reached/or does not show for pill count when requested, medications will be dc'd. If he has another inappropriate UDS, medication with be dc'd.    As part of patient's treatment plan I am prescribing a controlled substance. The patient has been made aware of the appropriate  use of such medications, including potential risk of somnolence, limited ability to drive and/or work safely, and potential for dependence and/or overdose. It has also been made clear that these medications are for use by this patient only, without concomitant use of alcohol or other substances, unless prescribed.  Narcan rx has been provided; education provided to pt.      Patient was encouraged to keep me informed of any acute changes, or any new concerning symptoms.  He is encourged to f/u with specialists as scheduled.  Pt is aware of reasons to seek emergent care.  Patient voiced understanding of all instructions and denied further questions.

## 2018-05-29 ENCOUNTER — TELEPHONE (OUTPATIENT)
Dept: FAMILY MEDICINE CLINIC | Facility: CLINIC | Age: 63
End: 2018-05-29

## 2018-06-05 RX ORDER — GABAPENTIN 600 MG/1
600 TABLET ORAL 3 TIMES DAILY
Qty: 90 TABLET | Refills: 0 | Status: SHIPPED | OUTPATIENT
Start: 2018-06-05 | End: 2018-07-24 | Stop reason: SDUPTHER

## 2018-06-05 NOTE — TELEPHONE ENCOUNTER
Refill approved but pt has to come to clinic to . In addition he needs to bring his pain medication for pill count. He needs UDS at time of  as well.

## 2018-06-05 NOTE — TELEPHONE ENCOUNTER
Pharmacy sent a request for a refill on patients gabapentin.  I have been unable to contact him by phone to come in for his pill count.

## 2018-06-15 ENCOUNTER — HOSPITAL ENCOUNTER (EMERGENCY)
Facility: HOSPITAL | Age: 63
Discharge: HOME | End: 2018-06-16
Attending: EMERGENCY MEDICINE | Admitting: EMERGENCY MEDICINE

## 2018-06-15 DIAGNOSIS — R31.9 HEMATURIA, UNSPECIFIED TYPE: ICD-10-CM

## 2018-06-15 DIAGNOSIS — N39.0 ACUTE UTI: Primary | ICD-10-CM

## 2018-06-15 PROCEDURE — 81001 URINALYSIS AUTO W/SCOPE: CPT | Performed by: EMERGENCY MEDICINE

## 2018-06-15 PROCEDURE — 99284 EMERGENCY DEPT VISIT MOD MDM: CPT

## 2018-06-15 PROCEDURE — 96365 THER/PROPH/DIAG IV INF INIT: CPT

## 2018-06-15 PROCEDURE — 96375 TX/PRO/DX INJ NEW DRUG ADDON: CPT

## 2018-06-15 PROCEDURE — 96361 HYDRATE IV INFUSION ADD-ON: CPT

## 2018-06-16 ENCOUNTER — APPOINTMENT (OUTPATIENT)
Dept: CT IMAGING | Facility: HOSPITAL | Age: 63
End: 2018-06-16

## 2018-06-16 VITALS
SYSTOLIC BLOOD PRESSURE: 113 MMHG | HEIGHT: 71 IN | OXYGEN SATURATION: 96 % | HEART RATE: 50 BPM | WEIGHT: 151.8 LBS | TEMPERATURE: 98.2 F | BODY MASS INDEX: 21.25 KG/M2 | DIASTOLIC BLOOD PRESSURE: 70 MMHG | RESPIRATION RATE: 18 BRPM

## 2018-06-16 LAB
ALBUMIN SERPL-MCNC: 4.1 G/DL (ref 3.5–5)
ALBUMIN/GLOB SERPL: 1.5 G/DL (ref 1–2)
ALP SERPL-CCNC: 60 U/L (ref 38–126)
ALT SERPL W P-5'-P-CCNC: 23 U/L (ref 13–69)
ANION GAP SERPL CALCULATED.3IONS-SCNC: 9.1 MMOL/L (ref 10–20)
AST SERPL-CCNC: 26 U/L (ref 15–46)
BACTERIA UR QL AUTO: ABNORMAL /HPF
BASOPHILS # BLD AUTO: 0.06 10*3/MM3 (ref 0–0.2)
BASOPHILS NFR BLD AUTO: 0.6 % (ref 0–2.5)
BILIRUB SERPL-MCNC: 0.7 MG/DL (ref 0.2–1.3)
BILIRUB UR QL STRIP: ABNORMAL
BUN BLD-MCNC: 20 MG/DL (ref 7–20)
BUN/CREAT SERPL: 20 (ref 6.3–21.9)
CALCIUM SPEC-SCNC: 9.3 MG/DL (ref 8.4–10.2)
CHLORIDE SERPL-SCNC: 103 MMOL/L (ref 98–107)
CLARITY UR: ABNORMAL
CO2 SERPL-SCNC: 30 MMOL/L (ref 26–30)
COLOR UR: YELLOW
CREAT BLD-MCNC: 1 MG/DL (ref 0.6–1.3)
DEPRECATED RDW RBC AUTO: 45.9 FL (ref 37–54)
EOSINOPHIL # BLD AUTO: 0.11 10*3/MM3 (ref 0–0.7)
EOSINOPHIL NFR BLD AUTO: 1.2 % (ref 0–7)
ERYTHROCYTE [DISTWIDTH] IN BLOOD BY AUTOMATED COUNT: 14.1 % (ref 11.5–14.5)
GFR SERPL CREATININE-BSD FRML MDRD: 75 ML/MIN/1.73
GLOBULIN UR ELPH-MCNC: 2.7 GM/DL
GLUCOSE BLD-MCNC: 101 MG/DL (ref 74–98)
GLUCOSE UR STRIP-MCNC: NEGATIVE MG/DL
HCT VFR BLD AUTO: 46.2 % (ref 42–52)
HGB BLD-MCNC: 15.9 G/DL (ref 14–18)
HGB UR QL STRIP.AUTO: ABNORMAL
HYALINE CASTS UR QL AUTO: ABNORMAL /LPF
IMM GRANULOCYTES # BLD: 0.02 10*3/MM3 (ref 0–0.06)
IMM GRANULOCYTES NFR BLD: 0.2 % (ref 0–0.6)
KETONES UR QL STRIP: ABNORMAL
LEUKOCYTE ESTERASE UR QL STRIP.AUTO: ABNORMAL
LYMPHOCYTES # BLD AUTO: 3.14 10*3/MM3 (ref 0.6–3.4)
LYMPHOCYTES NFR BLD AUTO: 33.2 % (ref 10–50)
MCH RBC QN AUTO: 30.2 PG (ref 27–31)
MCHC RBC AUTO-ENTMCNC: 34.4 G/DL (ref 30–37)
MCV RBC AUTO: 87.7 FL (ref 80–94)
MONOCYTES # BLD AUTO: 0.9 10*3/MM3 (ref 0–0.9)
MONOCYTES NFR BLD AUTO: 9.5 % (ref 0–12)
NEUTROPHILS # BLD AUTO: 5.22 10*3/MM3 (ref 2–6.9)
NEUTROPHILS NFR BLD AUTO: 55.3 % (ref 37–80)
NITRITE UR QL STRIP: NEGATIVE
NRBC BLD MANUAL-RTO: 0 /100 WBC (ref 0–0)
PH UR STRIP.AUTO: 7.5 [PH] (ref 5–8)
PLATELET # BLD AUTO: 209 10*3/MM3 (ref 130–400)
PMV BLD AUTO: 9.4 FL (ref 6–12)
POTASSIUM BLD-SCNC: 4.1 MMOL/L (ref 3.5–5.1)
PROT SERPL-MCNC: 6.8 G/DL (ref 6.3–8.2)
PROT UR QL STRIP: ABNORMAL
RBC # BLD AUTO: 5.27 10*6/MM3 (ref 4.7–6.1)
RBC # UR: ABNORMAL /HPF
REF LAB TEST METHOD: ABNORMAL
SODIUM BLD-SCNC: 138 MMOL/L (ref 137–145)
SP GR UR STRIP: 1.02 (ref 1–1.03)
SQUAMOUS #/AREA URNS HPF: ABNORMAL /HPF
UROBILINOGEN UR QL STRIP: ABNORMAL
WBC NRBC COR # BLD: 9.45 10*3/MM3 (ref 4.8–10.8)
WBC UR QL AUTO: ABNORMAL /HPF

## 2018-06-16 PROCEDURE — 96361 HYDRATE IV INFUSION ADD-ON: CPT

## 2018-06-16 PROCEDURE — 25010000002 CEFTRIAXONE PER 250 MG: Performed by: EMERGENCY MEDICINE

## 2018-06-16 PROCEDURE — 74176 CT ABD & PELVIS W/O CONTRAST: CPT

## 2018-06-16 PROCEDURE — 25010000002 KETOROLAC TROMETHAMINE PER 15 MG: Performed by: EMERGENCY MEDICINE

## 2018-06-16 PROCEDURE — 80053 COMPREHEN METABOLIC PANEL: CPT | Performed by: EMERGENCY MEDICINE

## 2018-06-16 PROCEDURE — 96365 THER/PROPH/DIAG IV INF INIT: CPT

## 2018-06-16 PROCEDURE — 96375 TX/PRO/DX INJ NEW DRUG ADDON: CPT

## 2018-06-16 PROCEDURE — 85025 COMPLETE CBC W/AUTO DIFF WBC: CPT | Performed by: EMERGENCY MEDICINE

## 2018-06-16 RX ORDER — CEPHALEXIN 500 MG/1
500 CAPSULE ORAL 3 TIMES DAILY
Qty: 18 CAPSULE | Refills: 0 | Status: SHIPPED | OUTPATIENT
Start: 2018-06-16 | End: 2018-06-22

## 2018-06-16 RX ORDER — KETOROLAC TROMETHAMINE 30 MG/ML
15 INJECTION, SOLUTION INTRAMUSCULAR; INTRAVENOUS ONCE
Status: COMPLETED | OUTPATIENT
Start: 2018-06-16 | End: 2018-06-16

## 2018-06-16 RX ADMIN — SODIUM CHLORIDE 1000 ML: 9 INJECTION, SOLUTION INTRAVENOUS at 00:39

## 2018-06-16 RX ADMIN — CEFTRIAXONE 1 G: 1 INJECTION, SOLUTION INTRAVENOUS at 01:39

## 2018-06-16 RX ADMIN — KETOROLAC TROMETHAMINE 15 MG: 30 INJECTION, SOLUTION INTRAMUSCULAR at 00:40
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
## 2018-06-18 ENCOUNTER — TELEPHONE (OUTPATIENT)
Dept: FAMILY MEDICINE CLINIC | Facility: CLINIC | Age: 63
End: 2018-06-18

## 2018-06-18 DIAGNOSIS — G89.4 CHRONIC PAIN SYNDROME: ICD-10-CM

## 2018-06-18 DIAGNOSIS — M47.812 OSTEOARTHRITIS OF CERVICAL SPINE, UNSPECIFIED SPINAL OSTEOARTHRITIS COMPLICATION STATUS: ICD-10-CM

## 2018-06-18 DIAGNOSIS — M48.02 SPINAL STENOSIS OF CERVICAL REGION: ICD-10-CM

## 2018-06-18 DIAGNOSIS — M50.30 DEGENERATION OF INTERVERTEBRAL DISC OF CERVICAL REGION: ICD-10-CM

## 2018-06-18 DIAGNOSIS — M51.36 DEGENERATION OF INTERVERTEBRAL DISC OF LUMBAR REGION: ICD-10-CM

## 2018-06-18 RX ORDER — OXYCODONE AND ACETAMINOPHEN 10; 325 MG/1; MG/1
TABLET ORAL
Qty: 75 TABLET | Refills: 0 | Status: SHIPPED | OUTPATIENT
Start: 2018-06-18 | End: 2018-07-10 | Stop reason: SDUPTHER

## 2018-06-18 NOTE — TELEPHONE ENCOUNTER
Patient came in for his pill count and is requesting a refill on his Endocet. I did a nathaniel and the pill count is in your basket.

## 2018-06-18 NOTE — TELEPHONE ENCOUNTER
I informed patients wife of Dr. Priest's message.  Aneta stated that he dose take a extra one at night because his legs hurt him.  She stated that Dr. Priest had told them at his last visit that he could take 2 at night instead of one if he needed to, so that was what Mr. Bridges has been doing.

## 2018-06-18 NOTE — TELEPHONE ENCOUNTER
Pt spouse called requesting an ER fu appt for pt side pain. Sts he is still in a lot of pain and wanted to be seen today. I informed Ms Cyrus that Dr Priest didn't have any available appt for today. I did inform her that pt did have a controlled med ready for pick, that he is due for a pill count to bring all meds in bottle today before 5:30pm. Pt spouse voiced understanding and would bring pt by today.

## 2018-06-20 ENCOUNTER — OFFICE VISIT (OUTPATIENT)
Dept: FAMILY MEDICINE CLINIC | Facility: CLINIC | Age: 63
End: 2018-06-20

## 2018-06-20 VITALS
BODY MASS INDEX: 21.76 KG/M2 | DIASTOLIC BLOOD PRESSURE: 70 MMHG | HEART RATE: 64 BPM | OXYGEN SATURATION: 98 % | SYSTOLIC BLOOD PRESSURE: 110 MMHG | HEIGHT: 70 IN | WEIGHT: 152 LBS

## 2018-06-20 DIAGNOSIS — N30.01 ACUTE CYSTITIS WITH HEMATURIA: Primary | ICD-10-CM

## 2018-06-20 DIAGNOSIS — N40.0 PROSTATIC HYPERTROPHY: ICD-10-CM

## 2018-06-20 DIAGNOSIS — R93.41 ABNORMAL CT SCAN, BLADDER: ICD-10-CM

## 2018-06-20 DIAGNOSIS — E53.8 COBALAMIN DEFICIENCY: ICD-10-CM

## 2018-06-20 DIAGNOSIS — R31.0 GROSS HEMATURIA: ICD-10-CM

## 2018-06-20 LAB
BILIRUB BLD-MCNC: NEGATIVE MG/DL
CLARITY, POC: CLEAR
COLOR UR: YELLOW
GLUCOSE UR STRIP-MCNC: NEGATIVE MG/DL
KETONES UR QL: NEGATIVE
LEUKOCYTE EST, POC: NEGATIVE
NITRITE UR-MCNC: NEGATIVE MG/ML
PH UR: 6.5 [PH] (ref 5–8)
PROT UR STRIP-MCNC: NEGATIVE MG/DL
RBC # UR STRIP: ABNORMAL /UL
SP GR UR: 1.01 (ref 1–1.03)
UROBILINOGEN UR QL: NORMAL

## 2018-06-20 PROCEDURE — 99214 OFFICE O/P EST MOD 30 MIN: CPT | Performed by: FAMILY MEDICINE

## 2018-06-20 NOTE — PROGRESS NOTES
Subjective   Michael Bridges is a 63 y.o. male.     History of Present Illness  Mr. Bridges presents today for ER f/u. He was seen at [facility] ER on 6/16/18 with c/o of right flank/abd pain x 3 days with gross hematuria. He was tx'd with abx and dc'd home. Advised to f/u with PCP and possibly see urologist. He was dc'd home on Keflex. Culture not done on urine from ER. He feels improved from ER visit. No further hematuria, less pain, some nausea, no fever.     He has chronic B12 deficiency. Is behind on B12 injection schedule. Due today. Still having fatigue, neuropathic pain.    The following portions of the patient's history were reviewed and updated as appropriate: allergies, current medications, past family history, past medical history, past social history, past surgical history and problem list.    Review of Systems   Constitutional: Positive for fatigue. Negative for chills, fever and unexpected weight change.   HENT: Negative for congestion, mouth sores, nosebleeds, rhinorrhea, sore throat and trouble swallowing.    Eyes: Negative for visual disturbance.   Respiratory: Negative for cough, shortness of breath and wheezing.    Cardiovascular: Positive for leg swelling. Negative for chest pain and palpitations.   Gastrointestinal: Positive for abdominal pain and nausea. Negative for constipation and vomiting.   Endocrine: Positive for cold intolerance.   Genitourinary: Positive for frequency, hematuria and urgency. Negative for difficulty urinating, discharge, genital sores, scrotal swelling and testicular pain.   Musculoskeletal: Positive for arthralgias, back pain, gait problem, myalgias, neck pain and neck stiffness.   Skin: Negative for rash and wound.   Neurological: Positive for dizziness, tremors, weakness, numbness and headaches. Negative for seizures, syncope and speech difficulty.   Hematological: Negative for adenopathy. Does not bruise/bleed easily.   Psychiatric/Behavioral: Positive for sleep  disturbance. Negative for confusion and dysphoric mood. The patient is nervous/anxious.        Objective    Vitals:    06/20/18 0912   BP: 110/70   Pulse: 64   SpO2: 98%     Body mass index is 21.81 kg/m².  1    06/20/18 0912   Weight: 68.9 kg (152 lb)       Physical Exam   Constitutional: He is oriented to person, place, and time. He appears well-developed. He is cooperative. He does not appear ill. No distress.   Appears fatigued today, thin   HENT:   Head: Normocephalic and atraumatic.   Right Ear: Decreased hearing is noted.   Left Ear: Decreased hearing is noted.   Mouth/Throat: Oropharynx is clear and moist and mucous membranes are normal. Mucous membranes are not dry. No oral lesions.   Eyes: Conjunctivae and lids are normal.   Neck:   Chronic hoarseness noted   Cardiovascular: Regular rhythm, normal heart sounds and intact distal pulses.  Bradycardia present.  Exam reveals no gallop.    No murmur heard.  Pulmonary/Chest: Effort normal. No respiratory distress. He has decreased breath sounds. He has no wheezes. He has no rhonchi. He has no rales.   Abdominal: Soft. Bowel sounds are normal. He exhibits no distension and no mass. There is no hepatosplenomegaly. There is tenderness (mild) in the suprapubic area. There is no CVA tenderness.   scaphoid   Musculoskeletal:        Cervical back: He exhibits decreased range of motion, bony tenderness and spasm. He exhibits no swelling.        Lumbar back: He exhibits decreased range of motion, bony tenderness, deformity (loss of normal lordosis) and spasm.   At baseline     Vascular Status -  His right foot exhibits no edema. His left foot exhibits no edema.  Lymphadenopathy:     He has no cervical adenopathy.        Right: No supraclavicular adenopathy present.        Left: No supraclavicular adenopathy present.   Neurological: He is alert and oriented to person, place, and time. Gait (antalgic, stooping, uses cane, holds on to others/stable objects to balance)  abnormal.   Generally weak and poorly conditioned. Marked weakness in legs. + SLR bilaterally, Lt > Rt   Skin: Skin is warm and dry. No bruising and no rash noted.   Psychiatric: He has a normal mood and affect. His behavior is normal. Cognition and memory are normal.   Nursing note and vitals reviewed.    CT abd/pelvis stone protocol: thick-walled, minimally filled bladder with infiltration of surrounding fat, possible infection but malignancy not excluded. Enlarged prostate with calcifications.    U/A on chart. Culture pending.    Lab Results   Component Value Date    WBC 9.45 06/16/2018    HGB 15.9 06/16/2018    HCT 46.2 06/16/2018    MCV 87.7 06/16/2018     06/16/2018     Lab Results   Component Value Date    GLUCOSE 101 (H) 06/16/2018    BUN 20 06/16/2018    CREATININE 1.00 06/16/2018    EGFRIFNONA 75 06/16/2018    EGFRIFAFRI 103 05/16/2018    BCR 20.0 06/16/2018    K 4.1 06/16/2018    CO2 30.0 06/16/2018    CALCIUM 9.3 06/16/2018    PROTENTOTREF 6.6 05/16/2018    ALBUMIN 4.10 06/16/2018    LABIL2 1.5 06/16/2018    AST 26 06/16/2018    ALT 23 06/16/2018     Lab Results   Component Value Date    HGBA1C 5.50 05/16/2018     Lab Results   Component Value Date    TSH 1.370 08/18/2017       Assessment/Plan   Michael was seen today for urinary tract infection.    Diagnoses and all orders for this visit:    Acute cystitis with hematuria  -     Ambulatory Referral to Urology  -     Urine Culture - Urine, Urine, Clean Catch  -     POC Urinalysis Dipstick, Automated    Cobalamin deficiency  -     Vitamin B12  -     Methylmalonic Acid, Serum  -     Folate    Gross hematuria  -     Ambulatory Referral to Urology  -     Urine Culture - Urine, Urine, Clean Catch  -     POC Urinalysis Dipstick, Automated    Abnormal CT scan, bladder  -     Ambulatory Referral to Urology    Prostatic hypertrophy  -     Ambulatory Referral to Urology    Complete course of abx as rx'd. Culture pending.   Refer to urology for possible  cystoscopy and/or f/u CT with contrast.  No sign of obstructive uropathy at this time.  Continue current analgesic.  I will contact patient regarding test results and provide instructions regarding any necessary changes in plan of care.  Keep routine f/u as schedule, f/u sooner as needed.  Patient was encouraged to keep me informed of any acute changes, lack of improvement, or any new concerning symptoms.  Pt is aware of reasons to seek emergent care.  Patient voiced understanding of all instructions and denied further questions.

## 2018-06-21 RX ORDER — GABAPENTIN 600 MG/1
TABLET ORAL
Qty: 90 TABLET | Refills: 2 | OUTPATIENT
Start: 2018-06-21

## 2018-06-22 LAB
BACTERIA UR CULT: NO GROWTH
BACTERIA UR CULT: NORMAL
FOLATE SERPL-MCNC: 8.44 NG/ML
Lab: NORMAL
METHYLMALONATE SERPL-SCNC: 167 NMOL/L (ref 0–378)
VIT B12 SERPL-MCNC: 406 PG/ML (ref 239–931)

## 2018-07-09 ENCOUNTER — TELEPHONE (OUTPATIENT)
Dept: FAMILY MEDICINE CLINIC | Facility: CLINIC | Age: 63
End: 2018-07-09

## 2018-07-09 DIAGNOSIS — M51.36 DEGENERATION OF INTERVERTEBRAL DISC OF LUMBAR REGION: ICD-10-CM

## 2018-07-09 DIAGNOSIS — M50.30 DEGENERATION OF INTERVERTEBRAL DISC OF CERVICAL REGION: ICD-10-CM

## 2018-07-09 DIAGNOSIS — M47.812 OSTEOARTHRITIS OF CERVICAL SPINE, UNSPECIFIED SPINAL OSTEOARTHRITIS COMPLICATION STATUS: ICD-10-CM

## 2018-07-09 DIAGNOSIS — G89.4 CHRONIC PAIN SYNDROME: ICD-10-CM

## 2018-07-09 DIAGNOSIS — M48.02 SPINAL STENOSIS OF CERVICAL REGION: ICD-10-CM

## 2018-07-10 RX ORDER — OXYCODONE AND ACETAMINOPHEN 10; 325 MG/1; MG/1
TABLET ORAL
Qty: 150 TABLET | Refills: 0 | Status: SHIPPED | OUTPATIENT
Start: 2018-07-10 | End: 2018-08-16 | Stop reason: SDUPTHER

## 2018-07-12 ENCOUNTER — TELEPHONE (OUTPATIENT)
Dept: FAMILY MEDICINE CLINIC | Facility: CLINIC | Age: 63
End: 2018-07-12

## 2018-07-12 DIAGNOSIS — M48.02 SPINAL STENOSIS OF CERVICAL REGION: ICD-10-CM

## 2018-07-12 DIAGNOSIS — M50.30 DEGENERATION OF INTERVERTEBRAL DISC OF CERVICAL REGION: ICD-10-CM

## 2018-07-12 DIAGNOSIS — M51.36 DEGENERATION OF INTERVERTEBRAL DISC OF LUMBAR REGION: ICD-10-CM

## 2018-07-12 DIAGNOSIS — M48.062 SPINAL STENOSIS OF LUMBAR REGION WITH NEUROGENIC CLAUDICATION: Primary | ICD-10-CM

## 2018-07-12 DIAGNOSIS — M47.892 OTHER OSTEOARTHRITIS OF SPINE, CERVICAL REGION: ICD-10-CM

## 2018-07-25 RX ORDER — GABAPENTIN 600 MG/1
TABLET ORAL
Qty: 90 TABLET | Refills: 0 | Status: SHIPPED | OUTPATIENT
Start: 2018-07-25 | End: 2018-08-29 | Stop reason: SDUPTHER

## 2018-08-16 ENCOUNTER — OFFICE VISIT (OUTPATIENT)
Dept: FAMILY MEDICINE CLINIC | Facility: CLINIC | Age: 63
End: 2018-08-16

## 2018-08-16 ENCOUNTER — TELEPHONE (OUTPATIENT)
Dept: FAMILY MEDICINE CLINIC | Facility: CLINIC | Age: 63
End: 2018-08-16

## 2018-08-16 VITALS
SYSTOLIC BLOOD PRESSURE: 118 MMHG | DIASTOLIC BLOOD PRESSURE: 70 MMHG | WEIGHT: 152 LBS | OXYGEN SATURATION: 99 % | HEIGHT: 70 IN | HEART RATE: 50 BPM | BODY MASS INDEX: 21.76 KG/M2

## 2018-08-16 DIAGNOSIS — G89.4 CHRONIC PAIN SYNDROME: ICD-10-CM

## 2018-08-16 DIAGNOSIS — J43.8 OTHER EMPHYSEMA (HCC): ICD-10-CM

## 2018-08-16 DIAGNOSIS — R00.1 BRADYCARDIA: ICD-10-CM

## 2018-08-16 DIAGNOSIS — I25.10 CHRONIC CORONARY ARTERY DISEASE: ICD-10-CM

## 2018-08-16 DIAGNOSIS — R53.1 WEAKNESS: ICD-10-CM

## 2018-08-16 DIAGNOSIS — M48.02 SPINAL STENOSIS OF CERVICAL REGION: ICD-10-CM

## 2018-08-16 DIAGNOSIS — E11.9 TYPE 2 DIABETES MELLITUS WITHOUT COMPLICATION, WITHOUT LONG-TERM CURRENT USE OF INSULIN (HCC): Primary | ICD-10-CM

## 2018-08-16 DIAGNOSIS — M50.30 DEGENERATION OF INTERVERTEBRAL DISC OF CERVICAL REGION: ICD-10-CM

## 2018-08-16 DIAGNOSIS — R07.89 ATYPICAL CHEST PAIN: ICD-10-CM

## 2018-08-16 DIAGNOSIS — E78.2 MIXED HYPERLIPIDEMIA: ICD-10-CM

## 2018-08-16 DIAGNOSIS — M47.812 OSTEOARTHRITIS OF CERVICAL SPINE, UNSPECIFIED SPINAL OSTEOARTHRITIS COMPLICATION STATUS: ICD-10-CM

## 2018-08-16 DIAGNOSIS — M51.36 DEGENERATION OF INTERVERTEBRAL DISC OF LUMBAR REGION: ICD-10-CM

## 2018-08-16 LAB
EXPIRATION DATE: NORMAL
GLUCOSE BLDC GLUCOMTR-MCNC: 101 MG/DL (ref 70–130)
HBA1C MFR BLD: 5.6 %
Lab: NORMAL

## 2018-08-16 PROCEDURE — 83036 HEMOGLOBIN GLYCOSYLATED A1C: CPT | Performed by: FAMILY MEDICINE

## 2018-08-16 PROCEDURE — 93000 ELECTROCARDIOGRAM COMPLETE: CPT | Performed by: FAMILY MEDICINE

## 2018-08-16 PROCEDURE — 96372 THER/PROPH/DIAG INJ SC/IM: CPT | Performed by: FAMILY MEDICINE

## 2018-08-16 PROCEDURE — 99214 OFFICE O/P EST MOD 30 MIN: CPT | Performed by: FAMILY MEDICINE

## 2018-08-16 PROCEDURE — 82947 ASSAY GLUCOSE BLOOD QUANT: CPT | Performed by: FAMILY MEDICINE

## 2018-08-16 RX ORDER — OXYCODONE AND ACETAMINOPHEN 10; 325 MG/1; MG/1
1 TABLET ORAL EVERY 8 HOURS PRN
Qty: 45 TABLET | Refills: 0 | Status: SHIPPED | OUTPATIENT
Start: 2018-08-16 | End: 2018-09-04

## 2018-08-16 RX ORDER — ATENOLOL 25 MG/1
TABLET ORAL
Refills: 0 | COMMUNITY
Start: 2018-08-14 | End: 2018-08-16 | Stop reason: SINTOL

## 2018-08-16 RX ORDER — ASPIRIN 81 MG/1
81 TABLET ORAL DAILY
COMMUNITY
End: 2018-08-16 | Stop reason: SDUPTHER

## 2018-08-16 RX ORDER — ASPIRIN 81 MG/1
81 TABLET ORAL DAILY
Qty: 30 TABLET | Refills: 5 | Status: SHIPPED | OUTPATIENT
Start: 2018-08-16 | End: 2022-11-22

## 2018-08-16 RX ADMIN — CYANOCOBALAMIN 1000 MCG: 1000 INJECTION, SOLUTION INTRAMUSCULAR; SUBCUTANEOUS at 10:59

## 2018-08-16 NOTE — PROGRESS NOTES
"Subjective   Michael Ned Bridges is a 63 y.o. male.     History of Present Illness  Mr. Bridges presents today with his wife for f/u on several chronic issues.  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 Has had persistently worsened neck and lower back pain. Particularly mentions lateral right lower leg from \"knee down\" which feels \"like it's on fire\". Aggravated by attempts at activity. Denies any other new focal neuro symptoms. Continues to feel his legs are weakening, falls frequently. Uses cane, walker.  Has undergone C spine surgery. Also has undergone L spine surgery per Dr. Dill both within past 2 years.  Interval symptoms: persistent headaches, stable neck pain, stable back pain, denies abdominal pain, denies pelvic pain, stable upper extremity pain, worsened lower extremity pain and worsened decreased range of motion, worsened generalized weakness.  Associated symptoms: weakness, numbness, anxiety, irritability, difficulty concentrating, sleep disturbance and decreased appetite. Medications include short-acting opioid analgesics, muscle relaxants, antidepressants and anticonvulsants.   Medications: He denies medication side effects except fatigue.   He uses miralax for mgt of constipation. Denies fall or injury since last visit.  He is also being followed by Dr. Wilhelm of neuropathy and chronic headaches.      Has persistent B12 deficiency. Due for f/u injection.     Other chronic conditions include COPD/emphysema, CAD, controlled NIDDM. No recent changes in status. He is taking meds as rx'd. He is on Ace-inh, ASA, statin. Not on beta blocker due to chronic bradycardia.    Wife is concerned as he seems \"more tired than usual\". Pt describes non-specific weakness, intermittent chest heaviness/tightness but denies  pain. No fever, increased cough, palpitations, n/v/d.    The following portions of the " patient's history were reviewed and updated as appropriate: allergies, current medications, past family history, past medical history, past social history, past surgical history and problem list.    Review of Systems   Constitutional: Positive for fatigue. Negative for chills, fever and unexpected weight change.   HENT: Negative for congestion, mouth sores, nosebleeds, rhinorrhea, sore throat and trouble swallowing.    Eyes: Negative for visual disturbance.   Respiratory: Negative for cough, shortness of breath and wheezing.    Cardiovascular: Positive for leg swelling. Negative for chest pain and palpitations.   Gastrointestinal: Positive for nausea. Negative for abdominal pain, constipation and vomiting.   Endocrine: Positive for cold intolerance.   Genitourinary: Negative for difficulty urinating.   Musculoskeletal: Positive for arthralgias, back pain, gait problem, myalgias, neck pain and neck stiffness.   Skin: Negative for rash and wound.   Neurological: Positive for dizziness, tremors, weakness, numbness and headaches. Negative for seizures, syncope and speech difficulty.   Hematological: Negative for adenopathy. Does not bruise/bleed easily.   Psychiatric/Behavioral: Positive for sleep disturbance. Negative for confusion and dysphoric mood. The patient is nervous/anxious.        Objective    Vitals:    08/16/18 1003   BP: 118/70   Pulse: 50   SpO2: 99%     Body mass index is 21.81 kg/m².  1    08/16/18  1003   Weight: 68.9 kg (152 lb)       Physical Exam   Constitutional: He is oriented to person, place, and time. He appears well-developed and well-nourished. He is cooperative. He does not appear ill. No distress.   Appears fatigued today, thin   HENT:   Head: Normocephalic and atraumatic.   Right Ear: Decreased hearing is noted.   Left Ear: Decreased hearing is noted.   Mouth/Throat: Mucous membranes are normal. Mucous membranes are not dry.   Eyes: Conjunctivae and lids are normal.   Neck:   Chronic  hoarseness noted   Cardiovascular: Regular rhythm, normal heart sounds and intact distal pulses.   Occasional extrasystoles are present. Bradycardia present.  Exam reveals no gallop.    No murmur heard.  Pulmonary/Chest: Effort normal. No respiratory distress. He has decreased breath sounds. He has no wheezes. He has no rhonchi. He has no rales.   Abdominal: He exhibits no distension and no mass. Bowel sounds are decreased. There is no hepatosplenomegaly. There is no tenderness.   scaphoid   Musculoskeletal:        Cervical back: He exhibits decreased range of motion, bony tenderness and spasm. He exhibits no swelling.        Lumbar back: He exhibits decreased range of motion, bony tenderness, deformity (loss of normal lordosis) and spasm.   At baseline     Vascular Status -  His right foot exhibits no edema. His left foot exhibits no edema.  Lymphadenopathy:     He has no cervical adenopathy.        Right: No supraclavicular adenopathy present.        Left: No supraclavicular adenopathy present.   Neurological: He is alert and oriented to person, place, and time. Gait (antalgic, stooping, uses cane, holds on to others/stable objects to balance) abnormal.   Generally weak and poorly conditioned. Marked weakness in legs. + SLR bilaterally, Lt > Rt.     Appears mildly sedated/sleepy today.   Skin: Skin is warm and dry. No bruising and no rash noted.   Psychiatric: He has a normal mood and affect. His behavior is normal. Cognition and memory are normal.   Good eye contact. Answers questions appropriately. Good personal hygiene and grooming.   Nursing note and vitals reviewed.      Recent Results (from the past 24 hour(s))   POC Glucose    Collection Time: 08/16/18 10:20 AM   Result Value Ref Range    Glucose 101 70 - 130 mg/dL   POC Glycated Hemoglobin, Total    Collection Time: 08/16/18 10:21 AM   Result Value Ref Range    Hemoglobin A1C 5.6 %    Lot Number [id number]     Expiration Date 5/20        ECG 12  Lead  Date/Time: 8/16/2018 10:37 AM  Performed by: JEREMIAH NÚÑEZ  Authorized by: JEREMIAH NÚÑEZ   Comparison: compared with previous ECG from 3/7/2017  Similar to previous ECG  Comparison to previous ECG: QTc previously 380  Rhythm: sinus bradycardia  Ectopy: infrequent PVCs  Rate: bradycardic  BPM: 43  Conduction: conduction normal  ST Segments: ST segments normal  T Waves: T waves normal  QRS axis: normal  Other findings: prolonged QTc interval  Clinical impression: non-specific ECG  Comments: GA int: 132 ms  QRS dur: 105 ms  QTc: 406 ms          Lab Results   Component Value Date    WBC 9.45 06/16/2018    HGB 15.9 06/16/2018    HCT 46.2 06/16/2018    MCV 87.7 06/16/2018     06/16/2018     Lab Results   Component Value Date    GLUCOSE 101 (H) 06/16/2018    BUN 20 06/16/2018    CREATININE 1.00 06/16/2018    EGFRIFNONA 75 06/16/2018    EGFRIFAFRI 103 05/16/2018    BCR 20.0 06/16/2018    K 4.1 06/16/2018    CO2 30.0 06/16/2018    CALCIUM 9.3 06/16/2018    PROTENTOTREF 6.6 05/16/2018    ALBUMIN 4.10 06/16/2018    LABIL2 1.4 05/16/2018    AST 26 06/16/2018    ALT 23 06/16/2018     Lab Results   Component Value Date    CHOL 182 06/13/2016    CHLPL 171 05/16/2018    TRIG 110 05/16/2018    HDL 48 05/16/2018     (H) 05/16/2018     Lab Results   Component Value Date    TSH 1.370 08/18/2017     Assessment/Plan   Michael was seen today for copd, hyperlipidemia, hypertension and osteoarthritis.    Diagnoses and all orders for this visit:    Type 2 diabetes mellitus without complication, without long-term current use of insulin (CMS/[facility])  -     POC Glucose  -     POC Glycated Hemoglobin, Total    Chronic coronary artery disease  -     ECG 12 Lead    Mixed hyperlipidemia    Other emphysema (CMS/HCC)    Chronic pain syndrome    Weakness  -     ECG 12 Lead    Atypical chest pain  -     ECG 12 Lead    Bradycardia    Other orders  -     aspirin 81 MG EC tablet; Take 1 tablet by mouth Daily.    NIDDM controlled. He is  reminded to have yearly diabetic eye exam.  CAD stable without change in EKG from previous.  HLP near goal. Continue statin, ASA.  COPD/emphysema stable.  Bradycardia is chronic. No syncope.  IM B12 updated today.  Updated colonoscopy advised. Pt declines at this time.  Smoking cessation advised.  Pt voiced understanding of health risks of cont' smoking.      Stable chronic pain but chronically debilitated as well. Has failed non-opioid options and has poor overall prognosis in regard to functional improvement otherwise.  STORM report reviewed and scanned into chart. Last STORM date 6/16/18. Last UDS inappropriate.  He has been referred to pain mngt for tx of chronic pain. I will check on status of referral. I will no longer be treating his chronic pain with opioid analgesics on a long-term basis, but depending of wait time for apt I may be able to provide decreased amount of medication for short period of time.     Routine fu in 3 months, sooner as needed.  Patient was encouraged to keep me informed of any acute changes, lack of improvement, or any new concerning symptoms.  He is encouraged to f/u with specialists as scheduled including neurology and urology.  Pt is aware of reasons to seek emergent care.  Patient voiced understanding of all instructions and denied further questions.

## 2018-08-16 NOTE — TELEPHONE ENCOUNTER
Please inform pt/spouse he should stop atenolol due to slow heart rate, fatigue.    Also I have E rx'd short term rx to pharmacy.   They need to be reminded that this is a temporary arrangement and he must establish care with pain mgnt to continue pain medications of this type.

## 2018-08-29 RX ORDER — GABAPENTIN 600 MG/1
TABLET ORAL
Qty: 90 TABLET | Refills: 0 | Status: SHIPPED | OUTPATIENT
Start: 2018-08-29 | End: 2018-09-25 | Stop reason: SDUPTHER

## 2018-08-29 NOTE — TELEPHONE ENCOUNTER
STORM reviewed. Refill approved. Medication E rx'd to pharmacy as requested. Pt to keep f/u apt as scheduled.

## 2018-09-04 DIAGNOSIS — M50.30 DEGENERATION OF INTERVERTEBRAL DISC OF CERVICAL REGION: ICD-10-CM

## 2018-09-04 DIAGNOSIS — M47.812 OSTEOARTHRITIS OF CERVICAL SPINE, UNSPECIFIED SPINAL OSTEOARTHRITIS COMPLICATION STATUS: ICD-10-CM

## 2018-09-04 DIAGNOSIS — G89.4 CHRONIC PAIN SYNDROME: ICD-10-CM

## 2018-09-04 DIAGNOSIS — M51.36 DEGENERATION OF INTERVERTEBRAL DISC OF LUMBAR REGION: ICD-10-CM

## 2018-09-04 DIAGNOSIS — M48.02 SPINAL STENOSIS OF CERVICAL REGION: ICD-10-CM

## 2018-09-04 RX ORDER — OXYCODONE AND ACETAMINOPHEN 7.5; 325 MG/1; MG/1
1 TABLET ORAL EVERY 8 HOURS PRN
Qty: 45 TABLET | Refills: 0 | Status: SHIPPED | OUTPATIENT
Start: 2018-09-04 | End: 2018-11-15

## 2018-09-04 RX ORDER — OXYCODONE AND ACETAMINOPHEN 10; 325 MG/1; MG/1
1 TABLET ORAL EVERY 8 HOURS PRN
Qty: 45 TABLET | Refills: 0 | Status: CANCELLED | OUTPATIENT
Start: 2018-09-04

## 2018-09-04 NOTE — TELEPHONE ENCOUNTER
Pt spouse called req refill on Endocet to [facility].  FYI- they just requested a few days ago to change the referral from Dr Faust to Dr Noel. I have submitted records to them for review to schedule.

## 2018-09-04 NOTE — TELEPHONE ENCOUNTER
As I informed pt/spouse at his last visit, I do not intend to continue prescribing pain medication for him. Since they have now changed their minds regarding preferred location, the wait time for apt will be extended. Therefore, I am able to provide a continued weaning dose for him to prevent withdrawal but will not be providing his routine refill until seen by pain mgnt.    Short term rx sent into pharmacy.

## 2018-09-07 DIAGNOSIS — M48.02 SPINAL STENOSIS OF CERVICAL REGION: ICD-10-CM

## 2018-09-07 DIAGNOSIS — M54.16 LUMBAR RADICULOPATHY: ICD-10-CM

## 2018-09-07 DIAGNOSIS — M51.36 DEGENERATION OF INTERVERTEBRAL DISC OF LUMBAR REGION: ICD-10-CM

## 2018-09-07 DIAGNOSIS — M50.30 DEGENERATION OF INTERVERTEBRAL DISC OF CERVICAL REGION: ICD-10-CM

## 2018-09-07 DIAGNOSIS — M47.892 OTHER OSTEOARTHRITIS OF SPINE, CERVICAL REGION: ICD-10-CM

## 2018-09-07 DIAGNOSIS — R26.9 ABNORMAL GAIT: ICD-10-CM

## 2018-09-07 DIAGNOSIS — M48.062 SPINAL STENOSIS OF LUMBAR REGION WITH NEUROGENIC CLAUDICATION: Primary | ICD-10-CM

## 2018-09-19 ENCOUNTER — OFFICE VISIT (OUTPATIENT)
Dept: NEUROLOGY | Facility: CLINIC | Age: 63
End: 2018-09-19

## 2018-09-19 VITALS
SYSTOLIC BLOOD PRESSURE: 108 MMHG | HEART RATE: 57 BPM | OXYGEN SATURATION: 98 % | DIASTOLIC BLOOD PRESSURE: 68 MMHG | HEIGHT: 70 IN

## 2018-09-19 DIAGNOSIS — M54.40 LOW BACK PAIN WITH SCIATICA, SCIATICA LATERALITY UNSPECIFIED, UNSPECIFIED BACK PAIN LATERALITY, UNSPECIFIED CHRONICITY: ICD-10-CM

## 2018-09-19 DIAGNOSIS — M48.02 CERVICAL STENOSIS OF SPINE: Primary | ICD-10-CM

## 2018-09-19 PROCEDURE — 99214 OFFICE O/P EST MOD 30 MIN: CPT | Performed by: PSYCHIATRY & NEUROLOGY

## 2018-09-19 RX ORDER — ATENOLOL 25 MG/1
25 TABLET ORAL DAILY
Refills: 4 | COMMUNITY
Start: 2018-09-13 | End: 2019-06-13
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
## 2018-09-27 RX ORDER — GABAPENTIN 600 MG/1
TABLET ORAL
Qty: 90 TABLET | Refills: 0 | Status: SHIPPED | OUTPATIENT
Start: 2018-09-27 | End: 2018-10-25 | Stop reason: SDUPTHER

## 2018-10-03 ENCOUNTER — TELEPHONE (OUTPATIENT)
Dept: FAMILY MEDICINE CLINIC | Facility: CLINIC | Age: 63
End: 2018-10-03

## 2018-10-03 NOTE — TELEPHONE ENCOUNTER
Patient called to get a refill on his pain med until he gets into a pain clinic. I called patient back at number they gave us, but phone not accepting calls. Per  Last note, she gave him a weaning dose last month and will not be refill it any more.

## 2018-10-08 ENCOUNTER — TELEPHONE (OUTPATIENT)
Dept: FAMILY MEDICINE CLINIC | Facility: CLINIC | Age: 63
End: 2018-10-08

## 2018-10-08 NOTE — TELEPHONE ENCOUNTER
Please inform patient that insurance declined follow-up CTs.  If he has been able to get an appointment with pain management these may not be necessary.  Please let me know if he has not yet had initial visit.

## 2018-10-08 NOTE — TELEPHONE ENCOUNTER
The PTC approved accepting his most recent imaging since insurance had denied repeat imaging.  Pt has been scheduled for an initial consultation on 11/8/2018.  Spoke with pt spouse several days ago and informed her of appt date/time, location and what to bring (paperwork, imaging discs, etc) or appt could be rescheduled.  She voiced understanding of all instructions.

## 2018-10-17 ENCOUNTER — TELEPHONE (OUTPATIENT)
Dept: FAMILY MEDICINE CLINIC | Facility: CLINIC | Age: 63
End: 2018-10-17

## 2018-10-17 NOTE — TELEPHONE ENCOUNTER
Pt spouse called wanting to know if Dr Priest would send Mr Bridges an rx for pain until he gets in to see pain clinic (appt currently scheduled for 11/8/18).  If so, would like this sent to [facility]. Please advise.

## 2018-10-19 RX ORDER — OMEPRAZOLE 40 MG/1
CAPSULE, DELAYED RELEASE ORAL
Qty: 30 CAPSULE | Refills: 0 | Status: SHIPPED | OUTPATIENT
Start: 2018-10-19 | End: 2018-11-16 | Stop reason: SDUPTHER

## 2018-10-22 RX ORDER — IPRATROPIUM/ALBUTEROL SULFATE 20-100 MCG
MIST INHALER (GRAM) INHALATION
Qty: 4 G | Refills: 0 | Status: SHIPPED | OUTPATIENT
Start: 2018-10-22 | End: 2018-12-08 | Stop reason: SDUPTHER

## 2018-10-26 RX ORDER — GABAPENTIN 600 MG/1
TABLET ORAL
Qty: 90 TABLET | Refills: 0 | Status: SHIPPED | OUTPATIENT
Start: 2018-10-26 | End: 2018-11-23 | Stop reason: SDUPTHER

## 2018-11-15 ENCOUNTER — OFFICE VISIT (OUTPATIENT)
Dept: FAMILY MEDICINE CLINIC | Facility: CLINIC | Age: 63
End: 2018-11-15

## 2018-11-15 VITALS
HEART RATE: 62 BPM | SYSTOLIC BLOOD PRESSURE: 104 MMHG | OXYGEN SATURATION: 95 % | WEIGHT: 148 LBS | DIASTOLIC BLOOD PRESSURE: 68 MMHG | TEMPERATURE: 98.1 F | HEIGHT: 70 IN | BODY MASS INDEX: 21.19 KG/M2

## 2018-11-15 DIAGNOSIS — R50.9 FEVER, UNSPECIFIED FEVER CAUSE: ICD-10-CM

## 2018-11-15 DIAGNOSIS — R05.9 COUGH: ICD-10-CM

## 2018-11-15 DIAGNOSIS — E11.9 TYPE 2 DIABETES MELLITUS WITHOUT COMPLICATION, WITHOUT LONG-TERM CURRENT USE OF INSULIN (HCC): ICD-10-CM

## 2018-11-15 DIAGNOSIS — R53.81 MALAISE: ICD-10-CM

## 2018-11-15 DIAGNOSIS — J44.1 COPD EXACERBATION (HCC): Primary | ICD-10-CM

## 2018-11-15 DIAGNOSIS — J43.8 OTHER EMPHYSEMA (HCC): ICD-10-CM

## 2018-11-15 DIAGNOSIS — J20.8 ACUTE BRONCHITIS DUE TO OTHER SPECIFIED ORGANISMS: ICD-10-CM

## 2018-11-15 LAB
EXPIRATION DATE: NORMAL
EXPIRATION DATE: NORMAL
FLUAV AG NPH QL: NORMAL
FLUBV AG NPH QL: NORMAL
GLUCOSE BLDC GLUCOMTR-MCNC: 102 MG/DL (ref 70–130)
HBA1C MFR BLD: 5.5 %
INTERNAL CONTROL: NORMAL
Lab: NORMAL
Lab: NORMAL

## 2018-11-15 PROCEDURE — 82947 ASSAY GLUCOSE BLOOD QUANT: CPT | Performed by: FAMILY MEDICINE

## 2018-11-15 PROCEDURE — 83036 HEMOGLOBIN GLYCOSYLATED A1C: CPT | Performed by: FAMILY MEDICINE

## 2018-11-15 PROCEDURE — 87804 INFLUENZA ASSAY W/OPTIC: CPT | Performed by: FAMILY MEDICINE

## 2018-11-15 PROCEDURE — 99214 OFFICE O/P EST MOD 30 MIN: CPT | Performed by: FAMILY MEDICINE

## 2018-11-15 PROCEDURE — 96372 THER/PROPH/DIAG INJ SC/IM: CPT | Performed by: FAMILY MEDICINE

## 2018-11-15 RX ORDER — DEXTROMETHORPHAN HYDROBROMIDE AND PROMETHAZINE HYDROCHLORIDE 15; 6.25 MG/5ML; MG/5ML
5 SYRUP ORAL 4 TIMES DAILY PRN
Qty: 180 ML | Refills: 0 | Status: SHIPPED | OUTPATIENT
Start: 2018-11-15 | End: 2019-03-12

## 2018-11-15 RX ORDER — PREDNISONE 20 MG/1
TABLET ORAL
Qty: 14 TABLET | Refills: 0 | Status: SHIPPED | OUTPATIENT
Start: 2018-11-15 | End: 2019-03-12

## 2018-11-15 RX ORDER — DOXYCYCLINE HYCLATE 100 MG
100 TABLET ORAL 2 TIMES DAILY
Qty: 20 TABLET | Refills: 0 | Status: SHIPPED | OUTPATIENT
Start: 2018-11-15 | End: 2018-11-25

## 2018-11-15 RX ADMIN — CYANOCOBALAMIN 1000 MCG: 1000 INJECTION, SOLUTION INTRAMUSCULAR; SUBCUTANEOUS at 11:21

## 2018-11-15 NOTE — PROGRESS NOTES
Subjective   Michael Bridges is a 63 y.o. male.     He presents today with c/o cough, congestion.      Cough   This is a new problem. The current episode started in the past 7 days. The problem has been rapidly worsening. The problem occurs every few minutes. The cough is productive of purulent sputum. Associated symptoms include chills, a fever, headaches, myalgias, nasal congestion, postnasal drip, rhinorrhea, shortness of breath and wheezing. Pertinent negatives include no chest pain, ear pain, hemoptysis, rash or sore throat. The symptoms are aggravated by lying down. Risk factors for lung disease include smoking/tobacco exposure. He has tried a beta-agonist inhaler and OTC cough suppressant for the symptoms. The treatment provided mild relief. His past medical history is significant for bronchitis, COPD and pneumonia.     He has comorbid type 2 DM NIDDM. Taking med as rx'd. Denies side effects. BG has been well controlled. On metformin, ACE, statin.    The following portions of the patient's history were reviewed and updated as appropriate: allergies, current medications, past family history, past medical history, past social history, past surgical history and problem list.    Review of Systems   Constitutional: Positive for appetite change, chills, fatigue and fever. Negative for unexpected weight change.   HENT: Positive for congestion, postnasal drip and rhinorrhea. Negative for ear pain, mouth sores, nosebleeds, sinus pain, sore throat and trouble swallowing.    Eyes: Negative for visual disturbance.   Respiratory: Positive for cough, shortness of breath and wheezing. Negative for hemoptysis.    Cardiovascular: Negative for chest pain, palpitations and leg swelling.   Gastrointestinal: Positive for nausea. Negative for abdominal pain, constipation, diarrhea and vomiting.   Endocrine: Positive for cold intolerance.   Genitourinary: Negative for difficulty urinating, dysuria and hematuria.    Musculoskeletal: Positive for arthralgias, back pain, gait problem, myalgias, neck pain and neck stiffness.   Skin: Negative for rash and wound.   Neurological: Positive for dizziness, tremors, weakness, numbness and headaches. Negative for seizures, syncope and speech difficulty.   Hematological: Negative for adenopathy. Bruises/bleeds easily.   Psychiatric/Behavioral: Positive for sleep disturbance. Negative for confusion and dysphoric mood. The patient is nervous/anxious.        Objective    Vitals:    11/15/18 1025   BP: 104/68   Pulse: 62   Temp: 98.1 °F (36.7 °C)   SpO2: 95%     Body mass index is 21.24 kg/m².      11/15/18  1025   Weight: 67.1 kg (148 lb)       Physical Exam   Constitutional: He is oriented to person, place, and time. He appears well-developed and well-nourished. He is cooperative. He appears ill. No distress.   HENT:   Head: Normocephalic and atraumatic.   Right Ear: Tympanic membrane, external ear and ear canal normal.   Left Ear: Tympanic membrane, external ear and ear canal normal.   Nose: Mucosal edema and rhinorrhea (clear) present.   Mouth/Throat: Mucous membranes are not dry. No oral lesions. Posterior oropharyngeal erythema present. No oropharyngeal exudate.   Eyes: Conjunctivae and lids are normal.   Cardiovascular: Normal rate, regular rhythm, normal heart sounds and intact distal pulses.   Pulmonary/Chest: Effort normal. No respiratory distress. He has decreased breath sounds. He has wheezes. He has no rhonchi. He has no rales.     Vascular Status -  His right foot exhibits no edema. His left foot exhibits no edema.  Lymphadenopathy:     He has no cervical adenopathy.        Right: No supraclavicular adenopathy present.        Left: No supraclavicular adenopathy present.   Neurological: He is alert and oriented to person, place, and time. Gait (stopped, antalgic, uses cane) abnormal.   Skin: Skin is warm and dry. No rash noted.   Psychiatric: He has a normal mood and affect. His  behavior is normal. Cognition and memory are normal.   Nursing note and vitals reviewed.      Recent Results (from the past 72 hour(s))   POC Glycated Hemoglobin, Total    Collection Time: 11/15/18 10:41 AM   Result Value Ref Range    Hemoglobin A1C 5.5 %    Lot Number [id number]     Expiration Date 7/2,020    POC Glucose    Collection Time: 11/15/18 10:41 AM   Result Value Ref Range    Glucose 102 70 - 130 mg/dL   POC Influenza A / B    Collection Time: 11/15/18 11:20 AM   Result Value Ref Range    Rapid Influenza A Ag neg     Rapid Influenza B Ag neg     Internal Control Passed Passed    Lot Number [id number]     Expiration Date 6/2,020        Assessment/Plan   Michael was seen today for cough and nasal congestion.    Diagnoses and all orders for this visit:    COPD exacerbation (CMS/[facility])    Type 2 diabetes mellitus without complication, without long-term current use of insulin (CMS/[facility])  -     POC Glycated Hemoglobin, Total  -     POC Glucose    Cough  -     POC Influenza A / B    Fever, unspecified fever cause  -     POC Influenza A / B    Malaise  -     POC Influenza A / B    Other emphysema (CMS/[facility])    Acute bronchitis due to other specified organisms    Other orders  -     predniSONE (DELTASONE) 20 MG tablet; 3 po qd x 2 days, then 2 po qd x 2 days, then 1 po qd x 2 days, then 1/2 po qd x 4 days  -     doxycycline (VIBRAMYICN) 100 MG tablet; Take 1 tablet by mouth 2 (Two) Times a Day for 10 days.  -     promethazine-dextromethorphan (PROMETHAZINE-DM) 6.25-15 MG/5ML syrup; Take 5 mL by mouth 4 (Four) Times a Day As Needed for Cough.    Symptomatic tx of bronchitis reviewed. Tobacco cessation advised.  Pt voiced understanding of health risks of cont'd tobacco use.  Oral corticosteroids and abx as above.  NIDDM well controlled.  Keep routine f/u apt next week, f/u sooner as needed.  Patient was encouraged to keep me informed of any acute changes, lack of improvement, or any new concerning symptoms.  Pt is aware  of reasons to seek emergent care.  Patient voiced understanding of all instructions and denied further questions.

## 2018-11-16 RX ORDER — OMEPRAZOLE 40 MG/1
CAPSULE, DELAYED RELEASE ORAL
Qty: 30 CAPSULE | Refills: 0 | Status: SHIPPED | OUTPATIENT
Start: 2018-11-16 | End: 2018-12-14 | Stop reason: SDUPTHER

## 2018-11-19 ENCOUNTER — RESULTS ENCOUNTER (OUTPATIENT)
Dept: FAMILY MEDICINE CLINIC | Facility: CLINIC | Age: 63
End: 2018-11-19

## 2018-11-19 ENCOUNTER — OFFICE VISIT (OUTPATIENT)
Dept: FAMILY MEDICINE CLINIC | Facility: CLINIC | Age: 63
End: 2018-11-19

## 2018-11-19 VITALS
BODY MASS INDEX: 21.9 KG/M2 | WEIGHT: 153 LBS | SYSTOLIC BLOOD PRESSURE: 122 MMHG | OXYGEN SATURATION: 98 % | DIASTOLIC BLOOD PRESSURE: 78 MMHG | HEART RATE: 72 BPM | HEIGHT: 70 IN

## 2018-11-19 DIAGNOSIS — J43.8 OTHER EMPHYSEMA (HCC): ICD-10-CM

## 2018-11-19 DIAGNOSIS — Z12.11 COLON CANCER SCREENING: ICD-10-CM

## 2018-11-19 DIAGNOSIS — F17.200 TOBACCO DEPENDENCE SYNDROME: ICD-10-CM

## 2018-11-19 DIAGNOSIS — E11.9 WELL CONTROLLED DIABETES MELLITUS (HCC): Primary | ICD-10-CM

## 2018-11-19 DIAGNOSIS — I25.10 CHRONIC CORONARY ARTERY DISEASE: ICD-10-CM

## 2018-11-19 DIAGNOSIS — Z23 NEED FOR INFLUENZA VACCINATION: ICD-10-CM

## 2018-11-19 DIAGNOSIS — Z23 NEED FOR HEPATITIS A VACCINATION: ICD-10-CM

## 2018-11-19 DIAGNOSIS — E78.2 MIXED HYPERLIPIDEMIA: ICD-10-CM

## 2018-11-19 PROCEDURE — 90686 IIV4 VACC NO PRSV 0.5 ML IM: CPT | Performed by: FAMILY MEDICINE

## 2018-11-19 PROCEDURE — 90471 IMMUNIZATION ADMIN: CPT | Performed by: FAMILY MEDICINE

## 2018-11-19 PROCEDURE — 90472 IMMUNIZATION ADMIN EACH ADD: CPT | Performed by: FAMILY MEDICINE

## 2018-11-19 PROCEDURE — 90632 HEPA VACCINE ADULT IM: CPT | Performed by: FAMILY MEDICINE

## 2018-11-19 PROCEDURE — 99214 OFFICE O/P EST MOD 30 MIN: CPT | Performed by: FAMILY MEDICINE

## 2018-11-19 RX ADMIN — CYANOCOBALAMIN 1000 MCG: 1000 INJECTION, SOLUTION INTRAMUSCULAR; SUBCUTANEOUS at 12:27

## 2018-11-19 NOTE — PROGRESS NOTES
"Subjective   Michael Ned Bridges is a 63 y.o. male.     History of Present Illness  Mr. Bridges presents today with his wife for f/u on several chronic issues.  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 Has had persistently worsened neck and lower back pain. Particularly mentions lateral right lower leg from \"knee down\" which feels \"like it's on fire\". Aggravated by attempts at activity. Denies any other new focal neuro symptoms. Continues to feel his legs are weakening, falls frequently. Uses cane, walker.  Has undergone C spine surgery. Also has undergone L spine surgery per Dr. Dill both within past 2-3 years.  Interval symptoms: persistent headaches, stable neck pain, stable back pain, denies abdominal pain, denies pelvic pain, stable upper extremity pain, worsened lower extremity pain and worsened decreased range of motion, worsened generalized weakness.  Associated symptoms: weakness, numbness, anxiety, irritability, difficulty concentrating, sleep disturbance and decreased appetite. Medications include short-acting opioid analgesics, muscle relaxants, antidepressants and anticonvulsants.   Medications: He denies medication side effects except fatigue.   He uses miralax for mgt of constipation. Denies fall or injury since last visit. He is also being followed by Dr. Wilhelm of neuropathy and chronic headaches.     Other chronic conditions include COPD/emphysema, CAD, HLP, controlled NIDDM. He is improved from COPD exacerbation/bronchitis for which he was seen earlier this month. Took medications as rx'd. Otherwise no recent changes in status. He is taking meds as rx'd. He is on Ace-inh, ASA, statin. Not on beta blocker due to chronic bradycardia. He continues to smoker.    The following portions of the patient's history were reviewed and updated as appropriate: allergies, current medications, past family " history, past medical history, past social history, past surgical history and problem list.    Review of Systems   Constitutional: Positive for fatigue. Negative for chills, fever and unexpected weight change.   HENT: Positive for congestion and postnasal drip. Negative for mouth sores, nosebleeds, rhinorrhea, sore throat and trouble swallowing.    Eyes: Negative for visual disturbance.   Respiratory: Negative for cough, shortness of breath and wheezing.    Cardiovascular: Positive for leg swelling. Negative for chest pain and palpitations.   Gastrointestinal: Positive for nausea. Negative for abdominal pain, constipation and vomiting.   Endocrine: Positive for cold intolerance.   Genitourinary: Negative for difficulty urinating.   Musculoskeletal: Positive for arthralgias, back pain, gait problem, myalgias, neck pain and neck stiffness.   Skin: Negative for rash and wound.   Neurological: Positive for dizziness, tremors, weakness, numbness and headaches. Negative for seizures, syncope and speech difficulty.   Hematological: Negative for adenopathy. Does not bruise/bleed easily.   Psychiatric/Behavioral: Positive for sleep disturbance. Negative for confusion and dysphoric mood. The patient is nervous/anxious.        Objective    Vitals:    11/19/18 0919   BP: 122/78   Pulse: 72   SpO2: 98%     Body mass index is 21.95 kg/m².      11/19/18 0919   Weight: 69.4 kg (153 lb)     Physical Exam   Constitutional: He is oriented to person, place, and time. He appears well-developed and well-nourished. He is cooperative. He does not appear ill. No distress.   Appears fatigued today, thin   HENT:   Head: Normocephalic and atraumatic.   Right Ear: Tympanic membrane, external ear and ear canal normal. Decreased hearing is noted.   Left Ear: Tympanic membrane, external ear and ear canal normal. Decreased hearing is noted.   Nose: Mucosal edema present. No rhinorrhea.   Mouth/Throat: Mucous membranes are not dry. He has dentures.  No oral lesions. Abnormal dentition. Posterior oropharyngeal erythema present. No oropharyngeal exudate.   Eyes: Conjunctivae and lids are normal.   Neck:   Chronic hoarseness noted   Cardiovascular: Normal rate, regular rhythm, normal heart sounds and intact distal pulses. Exam reveals no gallop.   No murmur heard.  Pulmonary/Chest: Effort normal. No respiratory distress. He has decreased breath sounds. He has no wheezes. He has no rhonchi. He has no rales.   Musculoskeletal:        Cervical back: He exhibits decreased range of motion, bony tenderness and spasm. He exhibits no swelling.        Lumbar back: He exhibits decreased range of motion, bony tenderness, deformity (loss of normal lordosis) and spasm.   At baseline     Vascular Status -  His right foot exhibits no edema. His left foot exhibits no edema.  Neurological: He is alert and oriented to person, place, and time. Gait (antalgic, stooping, uses cane, holds on to others/stable objects to balance) abnormal.   Generally weak and poorly conditioned. Marked weakness in legs. + SLR bilaterally, Lt > Rt.    Skin: Skin is warm and dry. No bruising and no rash noted.   Psychiatric: He has a normal mood and affect. His behavior is normal. Cognition and memory are normal.   Good eye contact. Answers questions appropriately. Good personal hygiene and grooming.   Nursing note and vitals reviewed.    Lab Results   Component Value Date    HGBA1C 5.5 11/15/2018     Assessment/Plan   Michael was seen today for diabetes, hyperlipidemia, hypertension and copd.    Diagnoses and all orders for this visit:    Well controlled diabetes mellitus (CMS/HCC)  -     CBC (No Diff)  -     Comprehensive Metabolic Panel  -     Microalbumin / Creatinine Urine Ratio - Urine, Clean Catch    Chronic coronary artery disease  -     CBC (No Diff)    Mixed hyperlipidemia  -     Comprehensive Metabolic Panel  -     Lipid Panel    Other emphysema (CMS/HCC)  -     CBC (No Diff)    Tobacco  dependence syndrome    Need for hepatitis A vaccination  -     hepatitis A (HAVRIX) vaccine 1,440 Units; Inject 1 mL into the appropriate muscle as directed by prescriber 1 (One) Time.    Colon cancer screening  -     Cologuard - Stool, Per Rectum; Future    Need for influenza vaccination  -     Fluarix/Fluzone/Afluria Quad>6 Months      Non-insulin-dependent diabetes mellitus well controlled.  Continue metformin.  He is encouraged to follow diabetic appropriate diet and exercise as able.    Chronic CAD currently asymptomatic.  On aspirin, statin, ACE inhibitor.    Hyperlipidemia of unknown status.  Repeat lipid profile as above.  Continue statin.  LFTs normal.    COPD/emphysema currently stable. Tobacco cessation advised.  Pt voiced understanding of health risks of cont'd tobacco use.    He declines colonoscopy but is willing to perform Cologuard testing.    Vaccinations updated as above.    Multifactorial chronic pain currently under management of Dr. Breezy Noel.  I've encouraged him to discuss his persistent pain, efficacy of opioid analgesics with Dr. Noel.  No changes to pain treatment regimen made at this time.    I will contact patient regarding test results and provide instructions regarding any necessary changes in plan of care.  Routine f/u in 3 months, sooner as needed/instructed.  Patient was encouraged to keep me informed of any acute changes, lack of improvement, or any new concerning symptoms.  Pt is aware of reasons to seek emergent care.  Patient voiced understanding of all instructions and denied further questions.        Please note that portions of this note may have been completed with a voice recognition program. Efforts were made to edit the dictations, but occasionally words are mistranscribed.

## 2018-11-20 LAB
ALBUMIN SERPL-MCNC: 4.1 G/DL (ref 3.5–5)
ALBUMIN/CREAT UR: <7.6 MG/G CREAT (ref 0–30)
ALBUMIN/GLOB SERPL: 1.5 G/DL (ref 1–2)
ALP SERPL-CCNC: 69 U/L (ref 38–126)
ALT SERPL-CCNC: 29 U/L (ref 13–69)
AST SERPL-CCNC: 27 U/L (ref 15–46)
BILIRUB SERPL-MCNC: 0.8 MG/DL (ref 0.2–1.3)
BUN SERPL-MCNC: 18 MG/DL (ref 7–20)
BUN/CREAT SERPL: 22.5 (ref 6.3–21.9)
CALCIUM SERPL-MCNC: 10 MG/DL (ref 8.4–10.2)
CHLORIDE SERPL-SCNC: 100 MMOL/L (ref 98–107)
CHOLEST SERPL-MCNC: 186 MG/DL (ref 0–199)
CO2 SERPL-SCNC: 33 MMOL/L (ref 26–30)
CREAT SERPL-MCNC: 0.8 MG/DL (ref 0.6–1.3)
CREAT UR-MCNC: 39.7 MG/DL
ERYTHROCYTE [DISTWIDTH] IN BLOOD BY AUTOMATED COUNT: 14.6 % (ref 11.5–14.5)
GLOBULIN SER CALC-MCNC: 2.8 GM/DL
GLUCOSE SERPL-MCNC: 72 MG/DL (ref 74–98)
HCT VFR BLD AUTO: 51.4 % (ref 42–52)
HDLC SERPL-MCNC: 62 MG/DL (ref 40–60)
HGB BLD-MCNC: 16.4 G/DL (ref 14–18)
LDLC SERPL CALC-MCNC: 103 MG/DL (ref 0–99)
MCH RBC QN AUTO: 29.1 PG (ref 27–31)
MCHC RBC AUTO-ENTMCNC: 31.9 G/DL (ref 30–37)
MCV RBC AUTO: 91.1 FL (ref 80–94)
MICROALBUMIN UR-MCNC: <3 UG/ML
PLATELET # BLD AUTO: 356 10*3/MM3 (ref 130–400)
POTASSIUM SERPL-SCNC: 4.4 MMOL/L (ref 3.5–5.1)
PROT SERPL-MCNC: 6.9 G/DL (ref 6.3–8.2)
RBC # BLD AUTO: 5.64 10*6/MM3 (ref 4.7–6.1)
SODIUM SERPL-SCNC: 138 MMOL/L (ref 137–145)
TRIGL SERPL-MCNC: 105 MG/DL
VLDLC SERPL CALC-MCNC: 21 MG/DL
WBC # BLD AUTO: 11.78 10*3/MM3 (ref 4.8–10.8)

## 2018-11-26 RX ORDER — GABAPENTIN 600 MG/1
600 TABLET ORAL 3 TIMES DAILY
Qty: 90 TABLET | Refills: 0 | Status: CANCELLED | OUTPATIENT
Start: 2018-11-26

## 2018-11-26 RX ORDER — GABAPENTIN 600 MG/1
TABLET ORAL
Qty: 90 TABLET | Refills: 0 | Status: SHIPPED | OUTPATIENT
Start: 2018-11-26 | End: 2018-12-19 | Stop reason: SDUPTHER

## 2018-12-10 RX ORDER — IPRATROPIUM/ALBUTEROL SULFATE 20-100 MCG
MIST INHALER (GRAM) INHALATION
Qty: 4 G | Refills: 0 | Status: SHIPPED | OUTPATIENT
Start: 2018-12-10 | End: 2020-02-05 | Stop reason: SDUPTHER

## 2018-12-11 ENCOUNTER — OFFICE VISIT (OUTPATIENT)
Dept: UROLOGY | Facility: CLINIC | Age: 63
End: 2018-12-11

## 2018-12-11 VITALS
SYSTOLIC BLOOD PRESSURE: 140 MMHG | OXYGEN SATURATION: 99 % | DIASTOLIC BLOOD PRESSURE: 70 MMHG | HEART RATE: 60 BPM | TEMPERATURE: 97.8 F | WEIGHT: 156 LBS | HEIGHT: 70 IN | BODY MASS INDEX: 22.33 KG/M2

## 2018-12-11 DIAGNOSIS — R31.0 GROSS HEMATURIA: Primary | ICD-10-CM

## 2018-12-11 DIAGNOSIS — N40.1 BENIGN PROSTATIC HYPERPLASIA WITH LOWER URINARY TRACT SYMPTOMS, SYMPTOM DETAILS UNSPECIFIED: ICD-10-CM

## 2018-12-11 DIAGNOSIS — F17.200 TOBACCO DEPENDENCE SYNDROME: ICD-10-CM

## 2018-12-11 LAB
BILIRUB BLD-MCNC: NEGATIVE MG/DL
CLARITY, POC: CLEAR
COLOR UR: YELLOW
GLUCOSE UR STRIP-MCNC: NEGATIVE MG/DL
KETONES UR QL: NEGATIVE
LEUKOCYTE EST, POC: NEGATIVE
NITRITE UR-MCNC: NEGATIVE MG/ML
PH UR: 6 [PH] (ref 5–8)
PROT UR STRIP-MCNC: NEGATIVE MG/DL
RBC # UR STRIP: NEGATIVE /UL
SP GR UR: 1.01 (ref 1–1.03)
UROBILINOGEN UR QL: NORMAL

## 2018-12-11 PROCEDURE — 99204 OFFICE O/P NEW MOD 45 MIN: CPT | Performed by: UROLOGY
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
## 2018-12-14 RX ORDER — OMEPRAZOLE 40 MG/1
CAPSULE, DELAYED RELEASE ORAL
Qty: 30 CAPSULE | Refills: 5 | Status: SHIPPED | OUTPATIENT
Start: 2018-12-14 | End: 2019-06-24 | Stop reason: SDUPTHER

## 2018-12-20 RX ORDER — GABAPENTIN 600 MG/1
TABLET ORAL
Qty: 90 TABLET | Refills: 0 | Status: SHIPPED | OUTPATIENT
Start: 2018-12-20 | End: 2019-01-28 | Stop reason: SDUPTHER

## 2018-12-24 ENCOUNTER — APPOINTMENT (OUTPATIENT)
Dept: GENERAL RADIOLOGY | Facility: HOSPITAL | Age: 63
End: 2018-12-24

## 2018-12-24 ENCOUNTER — HOSPITAL ENCOUNTER (EMERGENCY)
Facility: HOSPITAL | Age: 63
Discharge: HOME | End: 2018-12-24
Attending: STUDENT IN AN ORGANIZED HEALTH CARE EDUCATION/TRAINING PROGRAM | Admitting: STUDENT IN AN ORGANIZED HEALTH CARE EDUCATION/TRAINING PROGRAM

## 2018-12-24 VITALS
HEIGHT: 71 IN | WEIGHT: 155 LBS | TEMPERATURE: 97.8 F | RESPIRATION RATE: 18 BRPM | HEART RATE: 60 BPM | OXYGEN SATURATION: 95 % | BODY MASS INDEX: 21.7 KG/M2 | SYSTOLIC BLOOD PRESSURE: 120 MMHG | DIASTOLIC BLOOD PRESSURE: 70 MMHG

## 2018-12-24 DIAGNOSIS — M25.511 ACUTE PAIN OF RIGHT SHOULDER: Primary | ICD-10-CM

## 2018-12-24 PROCEDURE — 73030 X-RAY EXAM OF SHOULDER: CPT

## 2018-12-24 PROCEDURE — 99283 EMERGENCY DEPT VISIT LOW MDM: CPT

## 2018-12-24 RX ORDER — IBUPROFEN 800 MG/1
800 TABLET ORAL ONCE
Status: COMPLETED | OUTPATIENT
Start: 2018-12-24 | End: 2018-12-24

## 2018-12-24 RX ADMIN — IBUPROFEN 800 MG: 800 TABLET ORAL at 18:25

## 2019-01-29 RX ORDER — GABAPENTIN 600 MG/1
TABLET ORAL
Qty: 90 TABLET | Refills: 0 | Status: SHIPPED | OUTPATIENT
Start: 2019-01-29 | End: 2019-02-22 | Stop reason: SDUPTHER

## 2019-02-22 RX ORDER — GABAPENTIN 600 MG/1
TABLET ORAL
Qty: 90 TABLET | Refills: 0 | Status: SHIPPED | OUTPATIENT
Start: 2019-02-22 | End: 2019-04-04 | Stop reason: SDUPTHER

## 2019-03-12 ENCOUNTER — OFFICE VISIT (OUTPATIENT)
Dept: FAMILY MEDICINE CLINIC | Facility: CLINIC | Age: 64
End: 2019-03-12

## 2019-03-12 ENCOUNTER — RESULTS ENCOUNTER (OUTPATIENT)
Dept: FAMILY MEDICINE CLINIC | Facility: CLINIC | Age: 64
End: 2019-03-12

## 2019-03-12 VITALS
WEIGHT: 155 LBS | HEIGHT: 70 IN | HEART RATE: 70 BPM | OXYGEN SATURATION: 96 % | BODY MASS INDEX: 22.19 KG/M2 | DIASTOLIC BLOOD PRESSURE: 72 MMHG | SYSTOLIC BLOOD PRESSURE: 110 MMHG

## 2019-03-12 DIAGNOSIS — J43.8 OTHER EMPHYSEMA (HCC): ICD-10-CM

## 2019-03-12 DIAGNOSIS — Z53.20 COLONOSCOPY REFUSED: ICD-10-CM

## 2019-03-12 DIAGNOSIS — E78.2 MIXED HYPERLIPIDEMIA: ICD-10-CM

## 2019-03-12 DIAGNOSIS — E11.9 WELL CONTROLLED DIABETES MELLITUS (HCC): ICD-10-CM

## 2019-03-12 DIAGNOSIS — Z12.11 SCREENING FOR COLON CANCER: ICD-10-CM

## 2019-03-12 DIAGNOSIS — I25.10 CHRONIC CORONARY ARTERY DISEASE: Primary | ICD-10-CM

## 2019-03-12 DIAGNOSIS — F17.200 TOBACCO DEPENDENCE SYNDROME: ICD-10-CM

## 2019-03-12 DIAGNOSIS — R53.82 CHRONIC FATIGUE: ICD-10-CM

## 2019-03-12 DIAGNOSIS — G89.4 CHRONIC PAIN SYNDROME: ICD-10-CM

## 2019-03-12 DIAGNOSIS — F11.90 CHRONIC, CONTINUOUS USE OF OPIOIDS: ICD-10-CM

## 2019-03-12 LAB
EXPIRATION DATE: NORMAL
GLUCOSE BLDC GLUCOMTR-MCNC: 108 MG/DL (ref 70–130)
HBA1C MFR BLD: 5.5 %
Lab: NORMAL

## 2019-03-12 PROCEDURE — 83036 HEMOGLOBIN GLYCOSYLATED A1C: CPT | Performed by: FAMILY MEDICINE

## 2019-03-12 PROCEDURE — 82962 GLUCOSE BLOOD TEST: CPT | Performed by: FAMILY MEDICINE

## 2019-03-12 PROCEDURE — 99214 OFFICE O/P EST MOD 30 MIN: CPT | Performed by: FAMILY MEDICINE

## 2019-03-12 RX ORDER — OXYCODONE AND ACETAMINOPHEN 7.5; 325 MG/1; MG/1
TABLET ORAL
Refills: 0 | COMMUNITY
Start: 2019-01-18 | End: 2022-11-22

## 2019-03-12 NOTE — PROGRESS NOTES
Subjective   Michael Bridges is a 64 y.o. male.     History of Present Illness  Mr. Bridges presents today for routine f/u on several chronic medical problems.    He suffers from chronic pain syndrome due to severe cervical and lumbar degenerative disc disease, severe osteoarthritis of the cervical spine.  Has spinal stenosis in the cervical and lumbar regions.  Status post both cervical and lumbar spine surgeries within the last 2 years.  Currently being managed by pain specialist.  On Elavil, gabapentin, Percocet.  No recent falls or injuries.  No new focal neurological symptoms.    He has chronic coronary artery disease associated with hyperlipidemia.  Currently on Zocor, aspirin and atenolol.  No recent changes in status.  Denies chest pain, shortness of breath, palpitations, orthopnea.  He has stable intermittent swelling in the lower legs.    He has COPD/emphysema with chronic tobacco use.  Now down to 5 cigarettes/day.  Currently on Combivent with nebulizer as needed.  No recent increase cough, wheeze or dyspnea on exertion.  Denies chest pain or hemoptysis.  No fever or weight loss.  He continues to struggle with chronic insomnia.  Difficulty falling asleep.  Once asleep he generally awakens 2-3 hours.  He endorses severe daytime sleepiness and general fatigue.    He has non-insulin-dependent diabetes mellitus which is been well controlled.  Currently on metformin only.  Taking as prescribed.  Denies side effects.  Generally follows a diabetic appropriate diet.  No regular exercise due to orthopedic limitations.  He is up-to-date on diabetic eye exam.    The following portions of the patient's history were reviewed and updated as appropriate: allergies, current medications, past family history, past medical history, past social history, past surgical history and problem list.    Review of Systems   Constitutional: Positive for fatigue. Negative for chills, fever and unexpected weight change.   HENT:  Positive for congestion and postnasal drip. Negative for mouth sores, nosebleeds, rhinorrhea, sore throat and trouble swallowing.    Eyes: Negative for visual disturbance.   Respiratory: Negative for cough, shortness of breath and wheezing.    Cardiovascular: Positive for leg swelling (mild, stable). Negative for chest pain and palpitations.   Gastrointestinal: Positive for nausea. Negative for abdominal pain, constipation and vomiting.   Endocrine: Positive for cold intolerance.   Genitourinary: Negative for difficulty urinating, dysuria and hematuria.   Musculoskeletal: Positive for arthralgias, back pain, gait problem, myalgias, neck pain and neck stiffness.   Skin: Negative for rash and wound.   Neurological: Positive for dizziness, tremors, weakness, numbness and headaches. Negative for seizures, syncope and speech difficulty.   Hematological: Negative for adenopathy. Does not bruise/bleed easily.   Psychiatric/Behavioral: Positive for sleep disturbance. Negative for confusion and dysphoric mood. The patient is nervous/anxious.        Objective    Vitals:    03/12/19 0825   BP: 110/72   Pulse: 70   SpO2: 96%     Body mass index is 22.24 kg/m².      03/12/19  0825   Weight: 70.3 kg (155 lb)       Physical Exam   Constitutional: He is oriented to person, place, and time. He appears well-developed and well-nourished. He is cooperative. He does not appear ill. No distress.   Appears fatigued but no lethargic   HENT:   Head: Normocephalic and atraumatic.   Mouth/Throat: Mucous membranes are normal. Mucous membranes are not dry. He has dentures.   Eyes: Conjunctivae and lids are normal.   Neck: Normal carotid pulses present. Carotid bruit is not present. No thyromegaly present.   Chronic hoarseness noted   Cardiovascular: Normal rate, regular rhythm, normal heart sounds and intact distal pulses. Exam reveals no gallop.   No murmur heard.  Pulmonary/Chest: Effort normal. No respiratory distress. He has decreased  breath sounds. He has no wheezes. He has no rhonchi. He has no rales.   Musculoskeletal:        Cervical back: He exhibits decreased range of motion, bony tenderness and spasm. He exhibits no swelling.        Lumbar back: He exhibits decreased range of motion, bony tenderness, deformity (loss of normal lordosis) and spasm.   At baseline    Michael had a diabetic foot exam performed today.   During the foot exam he had a monofilament test performed (mildly diminished diffusely).  Vascular Status -  His right foot exhibits no edema. His left foot exhibits no edema.  Skin Integrity  -  His right foot skin is intact. He has right foot onychomycosis and callous right foot.  He has no right foot ulcer.His left foot skin is intact.He has left foot onychomycosis and callous left foot. He has no left foot ulcer..  Neurological: He is alert and oriented to person, place, and time. Gait (antalgic, stooping, uses cane, holds on to others/stable objects to balance) abnormal.   Generally weak and poorly conditioned. Marked weakness in legs. + SLR bilaterally, Lt > Rt.    Skin: Skin is warm and dry. Petechiae (left great toe, no skin break down) noted. No bruising and no rash noted.   Psychiatric: He has a normal mood and affect. His behavior is normal. Cognition and memory are normal.   Good eye contact. Answers questions appropriately. Good personal hygiene and grooming.   Nursing note and vitals reviewed.    Lab Results   Component Value Date    HGBA1C 5.5 03/12/2019     Lab Results   Component Value Date    WBC 11.78 (H) 11/19/2018    HGB 16.4 11/19/2018    HCT 51.4 11/19/2018    MCV 91.1 11/19/2018     11/19/2018     Lab Results   Component Value Date    GLUCOSE 101 (H) 06/16/2018    BUN 18 11/19/2018    CREATININE 0.80 11/19/2018    EGFRIFNONA 98 11/19/2018    EGFRIFAFRI 118 11/19/2018    BCR 22.5 (H) 11/19/2018    K 4.4 11/19/2018    CO2 33.0 (H) 11/19/2018    CALCIUM 10.0 11/19/2018    PROTENTOTREF 6.9 11/19/2018     ALBUMIN 4.10 11/19/2018    LABIL2 1.5 11/19/2018    AST 27 11/19/2018    ALT 29 11/19/2018     Lab Results   Component Value Date    CHOL 182 06/13/2016    CHLPL 186 11/19/2018    TRIG 105 11/19/2018    HDL 62 (H) 11/19/2018     (H) 11/19/2018     Lab Results   Component Value Date    MICROALBUR <3.0 11/19/2018     Lab Results   Component Value Date    TSH 1.370 08/18/2017     Assessment/Plan   Michael was seen today for diabetes and hyperlipidemia.    Diagnoses and all orders for this visit:    Chronic coronary artery disease  -     Overnight Sleep Oximetry Study; Future    Mixed hyperlipidemia    Other emphysema (CMS/HCC)  -     Overnight Sleep Oximetry Study; Future    Well controlled diabetes mellitus (CMS/HCC)  -     POC Glycated Hemoglobin, Total  -     POC Glucose    Tobacco dependence syndrome    Chronic pain syndrome    Screening for colon cancer    Colonoscopy refused  -     Cologuard - Stool, Per Rectum; Future    Chronic, continuous use of opioids  -     Overnight Sleep Oximetry Study; Future    Chronic fatigue  -     Overnight Sleep Oximetry Study; Future    Other orders  -     Zoster Vac Recomb Adjuvanted 50 MCG/0.5ML reconstituted suspension; Inject 50 mcg into the appropriate muscle as directed by prescriber Take As Directed. Repeat 6 months.      Multifactorial chronic pain syndrome appearing stable.  Continue current medication and follow with pain management as scheduled.  He is encouraged to discuss pain management taking over prescription of gabapentin so one prescriber is managing his control prescriptions.    Stable chronic coronary artery disease with mixed hyperlipidemia.  Continue Zocor, atenolol and aspirin. Is encouraged to follow heart healthy diet.  He does not have  essential hypertension but is currently on low-dose ACE inhibitor due to history of heart disease, diabetes etc.    COPD/emphysema generally appearing stable.  Continue Combivent.  Tobacco cessation advised.  Pt  voiced understanding of health risks of cont'd tobacco use.  Concern for possible nocturnal hypoxia based on chronic fatigue, sleep disturbance, excessive daytime sleepiness and comorbidities of emphysema and coronary artery disease as well as chronic prescription opioid use.  For that reason, overnight pulse oximetry as above.    Cologuard testing ordered previously but he did not follow through.  Would like to have repeat testing ordered.  He declines colonoscopy.    Routine f/u in 3 months, sooner as needed/instructed.  I will contact patient regarding test results and provide instructions regarding any necessary changes in plan of care.  Patient was encouraged to keep me informed of any acute changes, lack of improvement, or any new concerning symptoms.  Pt is aware of reasons to seek emergent care.  Patient voiced understanding of all instructions and denied further questions.

## 2019-04-02 RX ORDER — GABAPENTIN 600 MG/1
600 TABLET ORAL 3 TIMES DAILY
Qty: 90 TABLET | Refills: 0 | Status: CANCELLED | OUTPATIENT
Start: 2019-04-02

## 2019-04-04 RX ORDER — GABAPENTIN 600 MG/1
600 TABLET ORAL 3 TIMES DAILY
Qty: 90 TABLET | Refills: 0 | Status: SHIPPED | OUTPATIENT
Start: 2019-04-04

## 2019-06-12 ENCOUNTER — OFFICE VISIT (OUTPATIENT)
Dept: FAMILY MEDICINE CLINIC | Facility: CLINIC | Age: 64
End: 2019-06-12

## 2019-06-12 VITALS
DIASTOLIC BLOOD PRESSURE: 62 MMHG | HEIGHT: 70 IN | HEART RATE: 60 BPM | BODY MASS INDEX: 21.9 KG/M2 | OXYGEN SATURATION: 96 % | SYSTOLIC BLOOD PRESSURE: 100 MMHG | WEIGHT: 153 LBS

## 2019-06-12 DIAGNOSIS — E53.8 VITAMIN B12 DEFICIENCY: ICD-10-CM

## 2019-06-12 DIAGNOSIS — E53.8 COBALAMIN DEFICIENCY: ICD-10-CM

## 2019-06-12 DIAGNOSIS — J43.8 OTHER EMPHYSEMA (HCC): ICD-10-CM

## 2019-06-12 DIAGNOSIS — E78.2 MIXED HYPERLIPIDEMIA: ICD-10-CM

## 2019-06-12 DIAGNOSIS — I49.9 IRREGULAR HEART RHYTHM: ICD-10-CM

## 2019-06-12 DIAGNOSIS — R00.1 BRADYCARDIA: ICD-10-CM

## 2019-06-12 DIAGNOSIS — I25.10 CHRONIC CORONARY ARTERY DISEASE: ICD-10-CM

## 2019-06-12 DIAGNOSIS — E11.9 WELL CONTROLLED DIABETES MELLITUS (HCC): Primary | ICD-10-CM

## 2019-06-12 LAB
ALBUMIN SERPL-MCNC: 3.8 G/DL (ref 3.5–5)
ALBUMIN/GLOB SERPL: 1.5 G/DL (ref 1–2)
ALP SERPL-CCNC: 50 U/L (ref 38–126)
ALT SERPL-CCNC: 33 U/L (ref 13–69)
AST SERPL-CCNC: 28 U/L (ref 15–46)
BILIRUB SERPL-MCNC: 0.6 MG/DL (ref 0.2–1.3)
BUN SERPL-MCNC: 12 MG/DL (ref 7–20)
BUN/CREAT SERPL: 13.3 (ref 6.3–21.9)
CALCIUM SERPL-MCNC: 8.9 MG/DL (ref 8.4–10.2)
CHLORIDE SERPL-SCNC: 102 MMOL/L (ref 98–107)
CO2 SERPL-SCNC: 29 MMOL/L (ref 26–30)
CREAT SERPL-MCNC: 0.9 MG/DL (ref 0.6–1.3)
ERYTHROCYTE [DISTWIDTH] IN BLOOD BY AUTOMATED COUNT: 14.6 % (ref 12.3–15.4)
EXPIRATION DATE: NORMAL
GLOBULIN SER CALC-MCNC: 2.5 GM/DL
GLUCOSE SERPL-MCNC: 83 MG/DL (ref 74–98)
HBA1C MFR BLD: 5.5 %
HCT VFR BLD AUTO: 45.6 % (ref 37.5–51)
HGB BLD-MCNC: 15.1 G/DL (ref 13–17.7)
Lab: NORMAL
MCH RBC QN AUTO: 29.2 PG (ref 26.6–33)
MCHC RBC AUTO-ENTMCNC: 33.1 G/DL (ref 31.5–35.7)
MCV RBC AUTO: 88.2 FL (ref 79–97)
PLATELET # BLD AUTO: 223 10*3/MM3 (ref 140–450)
POTASSIUM SERPL-SCNC: 4.6 MMOL/L (ref 3.5–5.1)
PROT SERPL-MCNC: 6.3 G/DL (ref 6.3–8.2)
RBC # BLD AUTO: 5.17 10*6/MM3 (ref 4.14–5.8)
SODIUM SERPL-SCNC: 138 MMOL/L (ref 137–145)
VIT B12 SERPL-MCNC: 350 PG/ML (ref 239–931)
WBC # BLD AUTO: 5.61 10*3/MM3 (ref 3.4–10.8)

## 2019-06-12 PROCEDURE — 83036 HEMOGLOBIN GLYCOSYLATED A1C: CPT | Performed by: FAMILY MEDICINE

## 2019-06-12 PROCEDURE — 99214 OFFICE O/P EST MOD 30 MIN: CPT | Performed by: FAMILY MEDICINE

## 2019-06-12 PROCEDURE — 93000 ELECTROCARDIOGRAM COMPLETE: CPT | Performed by: FAMILY MEDICINE

## 2019-06-12 PROCEDURE — 96372 THER/PROPH/DIAG INJ SC/IM: CPT | Performed by: FAMILY MEDICINE

## 2019-06-12 RX ADMIN — CYANOCOBALAMIN 1000 MCG: 1000 INJECTION, SOLUTION INTRAMUSCULAR; SUBCUTANEOUS at 14:49

## 2019-06-12 NOTE — PROGRESS NOTES
Subjective   Michael Bridges is a 64 y.o. male.     History of Present Illness  Mr. Bridges presents today for routine f/u on several chronic medical problems.     He suffers from chronic pain syndrome due to severe cervical and lumbar degenerative disc disease, severe osteoarthritis of the cervical spine.  Has spinal stenosis in the cervical and lumbar regions.  Status post both cervical and lumbar spine surgeries within the last 2-3 years.  Currently being managed by pain specialist.  On Elavil, gabapentin, Percocet, muscle relaxant.  No recent falls or injuries.  No new focal neurological symptoms.     He has chronic coronary artery disease associated with hyperlipidemia. Currently on Zocor, ACE inhibitor and aspirin.  Had recent follow-up with cardiology at [facility].  Taken off his beta-blocker due to bradycardia.  He has upcoming follow-up appointment next week for recheck.  No recent changes in status. Denies chest pain, shortness of breath, palpitations, orthopnea.  He has stable intermittent swelling in the lower legs.     He has COPD/emphysema with chronic tobacco use.  Now down to 5 cigarettes/day.  Currently on Combivent with nebulizer as needed.  No recent increase cough, wheeze or dyspnea on exertion.  Denies chest pain or hemoptysis.  No fever or weight loss.      He has non-insulin-dependent diabetes mellitus which has been well controlled.  Currently on metformin only.  Taking as prescribed.  Denies side effects.  Generally follows a diabetic appropriate diet.  No regular exercise due to orthopedic limitations.  He is up-to-date on diabetic eye exam.    He has chronic B12 deficiency.  Due for repeat B12 intramuscular injection.    The following portions of the patient's history were reviewed and updated as appropriate: allergies, current medications, past family history, past medical history, past social history, past surgical history and problem list.    Review of  Systems   Constitutional: Positive for fatigue. Negative for appetite change, fever and unexpected weight change.   HENT: Positive for congestion. Negative for mouth sores, nosebleeds, rhinorrhea, sore throat and trouble swallowing.    Eyes: Negative for visual disturbance.   Respiratory: Negative for cough, shortness of breath and wheezing.    Cardiovascular: Positive for leg swelling (mild, stable). Negative for chest pain and palpitations.   Gastrointestinal: Negative for abdominal pain, constipation, nausea and vomiting.   Endocrine: Positive for cold intolerance. Negative for polydipsia and polyuria.   Genitourinary: Negative for decreased urine volume, dysuria and hematuria.   Musculoskeletal: Positive for arthralgias, back pain, gait problem, myalgias, neck pain and neck stiffness.   Skin: Negative for rash and wound.   Neurological: Positive for dizziness, tremors, weakness, numbness and headaches. Negative for syncope.   Hematological: Negative for adenopathy. Does not bruise/bleed easily.   Psychiatric/Behavioral: Positive for sleep disturbance. Negative for confusion and dysphoric mood. The patient is nervous/anxious.        Objective    Vitals:    06/12/19 1353   BP: 100/62   Pulse: 60   SpO2: 96%     Body mass index is 21.95 kg/m².      06/12/19  1353   Weight: 69.4 kg (153 lb)       Physical Exam   Constitutional: He is oriented to person, place, and time. He appears well-developed and well-nourished. He is cooperative. He appears ill (chronically). No distress.   Appears fatigued but no lethargic   HENT:   Head: Normocephalic and atraumatic.   Mouth/Throat: Oropharynx is clear and moist and mucous membranes are normal. Mucous membranes are not dry. He has dentures.   Eyes: Conjunctivae, EOM and lids are normal.   Neck: Neck supple. Normal carotid pulses present. Carotid bruit is not present.   Chronic hoarseness noted   Cardiovascular: Normal heart sounds and intact distal pulses. An irregularly  irregular rhythm present. Bradycardia present. Exam reveals no gallop.   No murmur heard.  Pulmonary/Chest: Effort normal. No respiratory distress. He has decreased breath sounds. He has no wheezes. He has no rhonchi. He has no rales.   Musculoskeletal:        Cervical back: He exhibits decreased range of motion, bony tenderness and spasm. He exhibits no swelling.        Lumbar back: He exhibits decreased range of motion, bony tenderness, deformity (loss of normal lordosis) and spasm.   At baseline     Vascular Status -  His right foot exhibits no edema. His left foot exhibits no edema.  Neurological: He is alert and oriented to person, place, and time. Gait (antalgic, stooping, uses cane, holds on to others/stable objects to balance) abnormal.   Generally weak and poorly conditioned. Marked weakness in legs. + SLR bilaterally, Lt > Rt.    Skin: Skin is warm and dry. No bruising and no rash noted.   Psychiatric: He has a normal mood and affect. His speech is normal and behavior is normal. Judgment and thought content normal. Cognition and memory are normal.   Good eye contact. Answers questions appropriately. Good personal hygiene and grooming.   Nursing note and vitals reviewed.        ECG 12 Lead  Date/Time: 6/12/2019 2:19 PM  Performed by: Diana Priest MD  Authorized by: Diana Priets MD   Comparison: compared with previous ECG from 8/16/2018  Similar to previous ECG  Comparison to previous ECG: PVC captured on previous EKG  Rhythm: sinus bradycardia  Ectopy comments: none  Rate: bradycardic  BPM: 42  Conduction: conduction normal  Q wave noted on lead: none.    ST Segments: ST segments normal  T Waves: T waves normal  QRS axis: normal  Other: no other findings    Clinical impression: non-specific ECG  Comments: [place] int: 129 ms  QRS dur: 109 ms  QTc: 404 ms          Lab Results   Component Value Date    HGBA1C 5.5 06/12/2019     Lab Results   Component Value Date    MICROALBUR <3.0 11/19/2018     Lab  Results   Component Value Date    TSH 1.370 08/18/2017     Assessment/Plan   Michael was seen today for diabetes, osteoarthritis and hyperlipidemia.    Diagnoses and all orders for this visit:    Well controlled diabetes mellitus (CMS/HCC)  -     POC Glycated Hemoglobin, Total  -     Comprehensive Metabolic Panel    Chronic coronary artery disease  -     CBC (No Diff)  -     Comprehensive Metabolic Panel    Mixed hyperlipidemia  -     Comprehensive Metabolic Panel    Cobalamin deficiency  -     CBC (No Diff)  -     Vitamin B12    Other emphysema (CMS/HCC)    Bradycardia  -     ECG 12 Lead  -     CBC (No Diff)  -     Comprehensive Metabolic Panel    Irregular heart rhythm  -     ECG 12 Lead  -     CBC (No Diff)  -     Comprehensive Metabolic Panel    Vitamin B12 deficiency       NIDDM controlled on metformin alone. Patient encouraged to take meds as rx'd, follow diabetic appropriate diet, exercise daily, perform feet check daily, and have yearly diabetic eye exams.    CAD in association with hyperlipidemia.  Doing well on current medications including statin, aspirin, ACE inhibitor.  Beta-blocker recently discontinued by cardiology due to persistent bradycardia.  He is once again bradycardic on EKG today.  Although irregular beats detected on exam, none captured on EKG today.  He is encouraged to keep follow-up appointment with cardiology as scheduled.    Assess status of vit/min deficiencies and replace as indicated. IM B12 given in office today.    COPD stable.  Complete smoking cessation encouraged.    Routine follow-up in 3 to 6 months, sooner as needed/instructed.  I will contact patient regarding test results and provide instructions regarding any necessary changes in plan of care.  Patient was encouraged to keep me informed of any acute changes, lack of improvement, or any new concerning symptoms.  Pt is aware of reasons to seek emergent care.  Patient voiced understanding of all instructions and denied further  questions.            Please note that portions of this note may have been completed with a voice recognition program. Efforts were made to edit the dictations, but occasionally words are mistranscribed.

## 2019-06-24 RX ORDER — OMEPRAZOLE 40 MG/1
CAPSULE, DELAYED RELEASE ORAL
Qty: 30 CAPSULE | Refills: 0 | Status: SHIPPED | OUTPATIENT
Start: 2019-06-24 | End: 2019-08-01 | Stop reason: SDUPTHER

## 2019-08-02 RX ORDER — OMEPRAZOLE 40 MG/1
CAPSULE, DELAYED RELEASE ORAL
Qty: 30 CAPSULE | Refills: 3 | Status: SHIPPED | OUTPATIENT
Start: 2019-08-02 | End: 2019-11-25 | Stop reason: SDUPTHER

## 2019-08-21 NOTE — TELEPHONE ENCOUNTER
Daily Note     Today's date: 2019  Patient name: Johnnie Lobo  : 2010  MRN: [id number]  Referring provider: Russ Smith MD  Dx:   Encounter Diagnosis     ICD-10-CM    1  Ankle pain in pediatric patient M25 579    2  Subluxation of left hip, sequela S73 002S    3  Subluxation of hip, acquired, right, sequela S73 001S        Start Time: 1530  Stop Time: 1640  Total time in clinic (min): 70 minutes    Phone Consult with [facility] medical staff  PA agreed with [facility]  To d/c J to IHEP with caregivers  PA reports plans to call and discuss plan with mom as well  Grandparents present in waiting room throughout 75% of the session today  Sister present as motivator t/o  Subjective: Grandparents report excellent therapy and motivated to install bathroom bar in powder room for standing practice  Grandparents report understanding of formal written HEP and PT POC to d/c today  Ismael Barrios reports readiness to perform HEP if Morrison and he know what to do  Objective:   1  Wheelchair self- wheeling to strengthen arms and improve use of arms        - Activity:  Performed in Clinic as previous                  - Activity:   Push backs from front of seat to sitting upright in wheelchair 2 times  2  Sitting buckled in chair          - Activity: seated dancing for 5-10minutes to active music     *encouraged hands in the air    *encouraged upper body twisting       - Activity: seated small ball or stuffed animal hand off     *encouraged full twist to grab ball at shoulder height 12 inches to side of shoulder   *give help at hands to grasp ball        - Activity: sitting mini kicks  (20 times)         *Hold a blanket over legs and kick or purchase a large ex ball & kick it at a wall 2 feet away       - Activity: Sit with feet supported on chair 2-15 minutes    *encouraged knees straight    *wrapped band or belt around feet to keep toes pointed toward the ceiling      3   Sitting Balance on the Edge of her Please confirm last fill date with pharmacy   chair FACING adult sitting in chair in front of her    *needs assistance: at left knee with adult knee or hand  *needs to be able to support herself with one hand on one knee of adult         - Activity:  reached to high five her right hand against adult left hand held at shoulder height-each way         - Activity:  Sit to/from standing 5 times   (stay standing for 10 fast seconds)  * at bath bar with adult ready to help lower to chair prn    OR    face to face with adult helping      4  Rest on belly on bed or floor                    - Activity:  Stretch hips by propping on bent elbows for 3minutes  - Activity:  Push ups from bent elbows to straight elbows 20times  - Activity:  Rolling 5ft  5times in each direction (left and right)                  - Activity:  Commando crawling 20ft       5  Resting on back                     - Activity:  Stretched hips by lying on back from sitting & keep knees bent over side of bed  3minutes        6  Daily Life:         - Activity:  Transfer to from toilet/bed/couch using strength in her legs to help stand        - Activity:  Spend 20-60minutes outdoors every day weather permitting (perform seated exs or wheelchair wheeling to strengthen arms)  Discussed - Equipment:  If not already installed add bath bars to bathroom and bedroom for increased standing opportunities  Discussed - Assistance:  Consider Home Health assistants to come 1-2 hours several days a week to help with ADLs, bathing and above activities to increase ease of adding increased activity to daily life      Discussed - Activity:  Standing program in a stander to strengthen legs and build bone density 10-20minutes 2 times per day    Assessment: Pt demonstrated decreased motivation to perform tasks consistently today  Grandfather asked great questions and is motivated to call with any further questions    Pt continues to benefit from increased participation in movement activities and hip/ankle stretching at home  Pt received from formal written HEP instruction with copies for grandparents that give after school care and copy for parents  Emphasis of overall treatment included progressing to independent HEP with goal of increased transfer strength, bed mobility, and increased muscle length to increase ease of self-care  Needs continued ankle scar mobility, stretching, and manuals to decrease foot pain to touch and weight bearing (needed for transfers)  Goals  Short Term Goals  1  Pt will demonstrate increased hip extension to less than 5 deg contracture in prone PROM testing (met at discharge)  2  Pt will demonstrate no TTP at all ankle/foot scars in 4-12 weeks  (met)  3  Pt will demonstrate increased knee flexion AROM by greater 8degrees in 6-12 weeks, with decreased ER t/o movement  (met [facility])    Long Term Goals  1  Pt will demonstrate increased strength to sit to from standing 10 consecutive repetitions in modified standing positions with moderate assistance for balance in 6-12 weeks   (met)  2  Pt will roll to either side 3 consecutive rolls in less than 3 minutes in 6-12 weeks   (met)  3  Pt will side scoot to the Left 3ft in 3 minutes or less in 6-12 weeks  (2ft with modA to L only)  4  Pt and caregivers will demonstrated understanding of diagnosis (met), prognosis (fair to good understanding) and ongoing HEP (WFL, with plans to call with questions) at discharge from skilled PT  Plan: Discharged to [facility] with caregivers

## 2019-09-25 ENCOUNTER — HOSPITAL ENCOUNTER (OUTPATIENT)
Dept: GENERAL RADIOLOGY | Facility: HOSPITAL | Age: 64
Discharge: HOME | End: 2019-09-25
Admitting: ORTHOPAEDIC SURGERY

## 2019-09-25 ENCOUNTER — TRANSCRIBE ORDERS (OUTPATIENT)
Dept: LAB | Facility: HOSPITAL | Age: 64
End: 2019-09-25

## 2019-09-25 DIAGNOSIS — M25.562 LEFT KNEE PAIN, UNSPECIFIED CHRONICITY: Primary | ICD-10-CM

## 2019-09-25 DIAGNOSIS — M54.10 RADICULOPATHY, UNSPECIFIED SPINAL REGION: ICD-10-CM

## 2019-09-25 DIAGNOSIS — M54.5 LOW BACK PAIN, UNSPECIFIED BACK PAIN LATERALITY, UNSPECIFIED CHRONICITY, WITH SCIATICA PRESENCE UNSPECIFIED: ICD-10-CM

## 2019-09-25 DIAGNOSIS — M25.562 LEFT KNEE PAIN, UNSPECIFIED CHRONICITY: ICD-10-CM

## 2019-09-25 PROCEDURE — 73562 X-RAY EXAM OF KNEE 3: CPT

## 2019-09-25 PROCEDURE — 72110 X-RAY EXAM L-2 SPINE 4/>VWS: CPT

## 2019-11-25 RX ORDER — OMEPRAZOLE 40 MG/1
CAPSULE, DELAYED RELEASE ORAL
Qty: 30 CAPSULE | Refills: 0 | Status: SHIPPED | OUTPATIENT
Start: 2019-11-25 | End: 2020-01-02

## 2019-12-12 ENCOUNTER — OFFICE VISIT (OUTPATIENT)
Dept: FAMILY MEDICINE CLINIC | Facility: CLINIC | Age: 64
End: 2019-12-12

## 2019-12-12 VITALS
HEIGHT: 70 IN | BODY MASS INDEX: 21.62 KG/M2 | TEMPERATURE: 97.9 F | WEIGHT: 151 LBS | DIASTOLIC BLOOD PRESSURE: 58 MMHG | OXYGEN SATURATION: 99 % | HEART RATE: 63 BPM | SYSTOLIC BLOOD PRESSURE: 92 MMHG

## 2019-12-12 DIAGNOSIS — E53.8 COBALAMIN DEFICIENCY: ICD-10-CM

## 2019-12-12 DIAGNOSIS — G89.4 CHRONIC PAIN SYNDROME: ICD-10-CM

## 2019-12-12 DIAGNOSIS — F17.200 TOBACCO DEPENDENCE SYNDROME: ICD-10-CM

## 2019-12-12 DIAGNOSIS — E11.9 WELL CONTROLLED DIABETES MELLITUS (HCC): Primary | ICD-10-CM

## 2019-12-12 DIAGNOSIS — J43.8 OTHER EMPHYSEMA (HCC): ICD-10-CM

## 2019-12-12 DIAGNOSIS — Z23 NEED FOR INFLUENZA VACCINATION: ICD-10-CM

## 2019-12-12 DIAGNOSIS — I25.10 CHRONIC CORONARY ARTERY DISEASE: ICD-10-CM

## 2019-12-12 DIAGNOSIS — E78.2 MIXED HYPERLIPIDEMIA: ICD-10-CM

## 2019-12-12 LAB — HBA1C MFR BLD: 5.3 %

## 2019-12-12 PROCEDURE — 83036 HEMOGLOBIN GLYCOSYLATED A1C: CPT | Performed by: FAMILY MEDICINE

## 2019-12-12 PROCEDURE — 90471 IMMUNIZATION ADMIN: CPT | Performed by: FAMILY MEDICINE

## 2019-12-12 PROCEDURE — 90674 CCIIV4 VAC NO PRSV 0.5 ML IM: CPT | Performed by: FAMILY MEDICINE

## 2019-12-12 PROCEDURE — 96372 THER/PROPH/DIAG INJ SC/IM: CPT | Performed by: FAMILY MEDICINE

## 2019-12-12 PROCEDURE — 99214 OFFICE O/P EST MOD 30 MIN: CPT | Performed by: FAMILY MEDICINE

## 2019-12-12 RX ORDER — ENALAPRIL MALEATE 10 MG/1
TABLET ORAL
Refills: 5 | COMMUNITY
Start: 2019-12-05

## 2019-12-12 RX ADMIN — CYANOCOBALAMIN 1000 MCG: 1000 INJECTION, SOLUTION INTRAMUSCULAR; SUBCUTANEOUS at 10:16

## 2019-12-12 NOTE — PROGRESS NOTES
Subjective   Michael Bridges is a 64 y.o. male.     History of Present Illness  Mr. Bridges presents today for routine f/u on several chronic medical problems.     He suffers from chronic pain syndrome due to severe cervical and lumbar degenerative disc disease, severe osteoarthritis of the cervical spine.  Has spinal stenosis in the cervical and lumbar regions.  Status post both cervical and lumbar spine surgeries within the last 2-3 years.  Currently being managed by pain specialist.  On Elavil, gabapentin, Percocet, muscle relaxant.  No recent falls or injuries.  No new focal neurological symptoms.     He has chronic coronary artery disease associated with hyperlipidemia. Currently on Zocor, ACE inhibitor and aspirin.  also followed by cardiology at [facility].  No recent changes in status. Denies chest pain, shortness of breath, palpitations, orthopnea.  He has stable intermittent swelling in the lower legs.     He has COPD/emphysema with chronic tobacco use.  Now down to 1/4 to 1/2 pack of cigarettes/day.  Currently on Combivent with nebulizer as needed.  No recent increase cough, wheeze or dyspnea on exertion.  Denies chest pain or hemoptysis.  No fever or weight loss.      He has non-insulin-dependent diabetes mellitus which has been well controlled.  Currently on metformin only.  Taking as prescribed.  Denies side effects.  Generally follows a diabetic appropriate diet.  No regular exercise due to orthopedic limitations.  He is not up-to-date on diabetic eye exam.     He has chronic B12 deficiency.  Due for repeat B12 intramuscular injection.    Pt's previous HPI reviewed and updated as indicated.     The following portions of the patient's history were reviewed and updated as appropriate: allergies, current medications, past family history, past medical history, past social history, past surgical history and problem list.    Review of Systems   Constitutional: Positive for  fatigue. Negative for appetite change, fever and unexpected weight change.   HENT: Positive for congestion. Negative for mouth sores, nosebleeds, rhinorrhea, sore throat and trouble swallowing.    Eyes: Negative for visual disturbance.   Respiratory: Negative for cough, shortness of breath and wheezing.    Cardiovascular: Positive for leg swelling (mild, stable). Negative for chest pain and palpitations.   Gastrointestinal: Negative for abdominal pain, constipation, nausea and vomiting.   Endocrine: Positive for cold intolerance. Negative for polydipsia and polyuria.   Genitourinary: Negative for decreased urine volume, dysuria and hematuria.   Musculoskeletal: Positive for arthralgias, back pain, gait problem, myalgias, neck pain and neck stiffness.   Skin: Negative for rash and wound.   Neurological: Positive for dizziness, tremors, weakness, numbness and headaches. Negative for syncope.   Hematological: Negative for adenopathy. Does not bruise/bleed easily.   Psychiatric/Behavioral: Positive for sleep disturbance. Negative for confusion and dysphoric mood. The patient is nervous/anxious.    Pt's previous ROS reviewed and updated as indicated.       Objective    Vitals:    12/12/19 0929   BP: 92/58   Pulse: 63   Temp: 97.9 °F (36.6 °C)   SpO2: 99%     Body mass index is 21.67 kg/m².      12/12/19  0929   Weight: 68.5 kg (151 lb)       Physical Exam   Constitutional: He is oriented to person, place, and time. He appears well-developed and well-nourished. He is cooperative. He appears ill (chronically). No distress.   Appears fatigued but no lethargic   HENT:   Head: Normocephalic and atraumatic.   Mouth/Throat: Mucous membranes are normal. Mucous membranes are not dry. He has dentures.   Eyes: Conjunctivae, EOM and lids are normal. No scleral icterus.   Neck: Neck supple. Normal carotid pulses present. Carotid bruit is not present.   Chronic hoarseness noted   Cardiovascular: Normal rate, regular rhythm, normal  heart sounds and intact distal pulses. Exam reveals no gallop.   No murmur heard.  Pulmonary/Chest: Effort normal. No respiratory distress. He has decreased breath sounds. He has no wheezes. He has no rhonchi. He has no rales.   Musculoskeletal:        Cervical back: He exhibits decreased range of motion, bony tenderness and spasm. He exhibits no swelling.        Lumbar back: He exhibits decreased range of motion, bony tenderness, deformity (loss of normal lordosis) and spasm.   At baseline     Vascular Status -  His right foot exhibits no edema. His left foot exhibits no edema.  Lymphadenopathy:     He has no cervical adenopathy.   Neurological: He is alert and oriented to person, place, and time. Gait (antalgic, stooping, uses cane, holds on to others/stable objects to balance) abnormal.   Generally weak and poorly conditioned. Marked weakness in legs.    Skin: Skin is warm and dry. No bruising and no rash noted.   Psychiatric: He has a normal mood and affect. His speech is normal and behavior is normal. Judgment and thought content normal. Cognition and memory are normal.   Good eye contact. Answers questions appropriately. Good personal hygiene and grooming.   Nursing note and vitals reviewed.  Pt's previous physical exam reviewed and updated as indicated.    Lab Results   Component Value Date    HGBA1C 5.3 12/12/2019    HGBA1C 5.5 06/12/2019    HGBA1C 5.5 03/12/2019     Lab Results   Component Value Date    MICROALBUR <3.0 11/19/2018    CREATININE 0.90 06/12/2019     Lab Results   Component Value Date    WBC 5.61 06/12/2019    HGB 15.1 06/12/2019    HCT 45.6 06/12/2019    MCV 88.2 06/12/2019     06/12/2019       Lab Results   Component Value Date    GLUCOSE 101 (H) 06/16/2018    BUN 12 06/12/2019    CREATININE 0.90 06/12/2019    EGFRIFNONA 85 06/12/2019    EGFRIFAFRI 103 06/12/2019    BCR 13.3 06/12/2019    K 4.6 06/12/2019    CO2 29.0 06/12/2019    CALCIUM 8.9 06/12/2019    PROTENTOTREF 6.3 06/12/2019     ALBUMIN 3.80 06/12/2019    LABIL2 1.5 06/12/2019    AST 28 06/12/2019    ALT 33 06/12/2019       Lab Results   Component Value Date    CHOL 182 06/13/2016    CHLPL 186 11/19/2018    TRIG 105 11/19/2018    HDL 62 (H) 11/19/2018     (H) 11/19/2018       Lab Results   Component Value Date    TSH 1.370 08/18/2017           Assessment/Plan   Michael was seen today for diabetes and hypertension.    Diagnoses and all orders for this visit:    Well controlled diabetes mellitus (CMS/HCC)  -     POC Glycosylated Hemoglobin (Hb A1C)    Chronic coronary artery disease    Mixed hyperlipidemia    Other emphysema (CMS/HCC)    Cobalamin deficiency    Chronic pain syndrome    Tobacco dependence syndrome    Need for influenza vaccination    Other orders  -     Flucelvax Quad=>4Years ([id number])       NIDDM well controlled. To avoid increased risk of possible hypoglycemia, d/c metformin for now. Patient encouraged to follow diabetic appropriate diet, exercise daily, perform feet check daily, and have yearly diabetic eye exams.    CAD, HLP appear stable. F/u with cardiology as scheduled. Continue ASA, ACE-inh, statin. Not on BB due to previous bradycardia.    COPD/emphysema stable. Tobacco cessation advised.  Pt voiced understanding of health risks of cont'd tobacco use.    B12 injection per routine today.    Stable chronic pain, f/u with pain mgnt as scheduled.    Routine f/u in 3 months, sooner as needed.  Patient was encouraged to keep me informed of any acute changes, or any new concerning symptoms.  Pt is aware of reasons to seek emergent care.  Patient voiced understanding of all instructions and denied further questions.

## 2020-01-02 RX ORDER — OMEPRAZOLE 40 MG/1
CAPSULE, DELAYED RELEASE ORAL
Qty: 90 CAPSULE | Refills: 1 | Status: SHIPPED | OUTPATIENT
Start: 2020-01-02 | End: 2020-06-24

## 2020-01-22 NOTE — TELEPHONE ENCOUNTER
Patient informed and voiced understanding.   The patient's symptoms have gotten worse and he is had an episode of urinary retention  AUA symptom score is 26  He is not happy with his voiding pattern  We discussed treatment options  He recently had cystoscopy by Dr Tal Waddell  I did recommend  he return for transrectal ultrasound of the prostate and uroflow study  Will then be in a better position to decide which procedure would be appropriate for him  In the interim he will gradually increase his terazosin to 10 mg

## 2020-02-05 ENCOUNTER — OFFICE VISIT (OUTPATIENT)
Dept: FAMILY MEDICINE CLINIC | Facility: CLINIC | Age: 65
End: 2020-02-05

## 2020-02-05 VITALS
DIASTOLIC BLOOD PRESSURE: 78 MMHG | HEART RATE: 65 BPM | BODY MASS INDEX: 21.76 KG/M2 | HEIGHT: 70 IN | OXYGEN SATURATION: 99 % | WEIGHT: 152 LBS | SYSTOLIC BLOOD PRESSURE: 128 MMHG | TEMPERATURE: 98.4 F

## 2020-02-05 DIAGNOSIS — J43.8 OTHER EMPHYSEMA (HCC): ICD-10-CM

## 2020-02-05 DIAGNOSIS — J20.9 ACUTE BRONCHITIS, UNSPECIFIED ORGANISM: Primary | ICD-10-CM

## 2020-02-05 DIAGNOSIS — R05.9 COUGH: ICD-10-CM

## 2020-02-05 DIAGNOSIS — J44.1 COPD EXACERBATION (HCC): ICD-10-CM

## 2020-02-05 PROCEDURE — 96372 THER/PROPH/DIAG INJ SC/IM: CPT | Performed by: FAMILY MEDICINE

## 2020-02-05 PROCEDURE — 99214 OFFICE O/P EST MOD 30 MIN: CPT | Performed by: FAMILY MEDICINE

## 2020-02-05 RX ORDER — DOXYCYCLINE 100 MG/1
100 TABLET ORAL 2 TIMES DAILY
Qty: 20 TABLET | Refills: 0 | Status: SHIPPED | OUTPATIENT
Start: 2020-02-05 | End: 2020-02-15

## 2020-02-05 RX ORDER — BENZONATATE 200 MG/1
200 CAPSULE ORAL 3 TIMES DAILY PRN
Qty: 30 CAPSULE | Refills: 0 | Status: SHIPPED | OUTPATIENT
Start: 2020-02-05 | End: 2020-06-03

## 2020-02-05 RX ORDER — PREDNISONE 20 MG/1
TABLET ORAL
Qty: 14 TABLET | Refills: 0 | Status: SHIPPED | OUTPATIENT
Start: 2020-02-05 | End: 2020-10-01

## 2020-02-05 RX ADMIN — CYANOCOBALAMIN 1000 MCG: 1000 INJECTION, SOLUTION INTRAMUSCULAR; SUBCUTANEOUS at 14:32

## 2020-02-05 NOTE — PROGRESS NOTES
Subjective   Michael Bridges is a 65 y.o. male.     He c/o cough    Cough   This is a new problem. The current episode started 1 to 4 weeks ago (at least 10 days). The problem has been gradually worsening. The problem occurs every few minutes. The cough is productive of sputum. Associated symptoms include nasal congestion, postnasal drip, rhinorrhea, shortness of breath and wheezing. Pertinent negatives include no chest pain, chills, ear pain, eye redness, fever, headaches, hemoptysis, rash, sore throat, sweats or weight loss. The symptoms are aggravated by lying down. Risk factors for lung disease include smoking/tobacco exposure. He has tried nothing for the symptoms. His past medical history is significant for COPD and emphysema.     Smoking 1 pack every 2-3 days    The following portions of the patient's history were reviewed and updated as appropriate: allergies, current medications, past family history, past medical history, past social history, past surgical history and problem list.    Review of Systems   Constitutional: Positive for fatigue. Negative for chills, fever and weight loss.   HENT: Positive for congestion, postnasal drip and rhinorrhea. Negative for ear pain, mouth sores, nosebleeds, sinus pain, sneezing and sore throat.    Eyes: Negative for pain, discharge and redness.   Respiratory: Positive for cough, shortness of breath and wheezing. Negative for hemoptysis.    Cardiovascular: Negative for chest pain.   Gastrointestinal: Negative for diarrhea, nausea and vomiting.   Musculoskeletal: Positive for arthralgias, back pain, gait problem and neck pain.   Skin: Negative for rash.   Neurological: Positive for dizziness (mild) and weakness (generalized). Negative for headaches.   Hematological: Negative for adenopathy.   Psychiatric/Behavioral: Positive for sleep disturbance (due to cough). Negative for confusion.       Objective    Vitals:    02/05/20 1400   BP: 128/78   Pulse: 65   Temp: 98.4  °F (36.9 °C)   SpO2: 99%     Body mass index is 21.81 kg/m².      02/05/20  1400   Weight: 68.9 kg (152 lb)       Physical Exam   Constitutional: He is oriented to person, place, and time. He appears well-developed and well-nourished. He is cooperative. He appears ill (mildly). No distress.   HENT:   Head: Normocephalic and atraumatic.   Right Ear: Tympanic membrane, external ear and ear canal normal.   Left Ear: Tympanic membrane, external ear and ear canal normal.   Nose: Mucosal edema and rhinorrhea (clear) present.   Mouth/Throat: Oropharynx is clear and moist and mucous membranes are normal. Mucous membranes are not dry. No oral lesions.   Eyes: Conjunctivae and lids are normal.   Cardiovascular: Normal rate, regular rhythm, normal heart sounds and intact distal pulses.   Pulmonary/Chest: Effort normal. No respiratory distress. He has decreased breath sounds. He has no wheezes. He has no rhonchi. He has no rales.   Lymphadenopathy:     He has no cervical adenopathy.   Neurological: He is alert and oriented to person, place, and time.   Skin: Skin is warm and dry. No rash noted.   Psychiatric: He has a normal mood and affect. His behavior is normal.   Nursing note and vitals reviewed.      Assessment/Plan   Michael was seen today for cough and nasal congestion.    Diagnoses and all orders for this visit:    Acute bronchitis, unspecified organism    Cough    Other emphysema (CMS/HCC)    COPD exacerbation (CMS/[facility])    Other orders  -     doxycycline (ADOXA) 100 MG tablet; Take 1 tablet by mouth 2 (Two) Times a Day for 10 days.  -     predniSONE (DELTASONE) 20 MG tablet; 3 po qd x 2 days, then 2 po qd x 2 days, then 1 po qd x 2 days, then 1/2 po qd x 4 days  -     benzonatate (TESSALON) 200 MG capsule; Take 1 capsule by mouth 3 (Three) Times a Day As Needed for Cough.  -     ipratropium-albuterol (COMBIVENT RESPIMAT)  MCG/ACT inhaler; Inhale 1 puff 4 (Four) Times a Day.       Routine f/u as schedule next  month.  Tobacco cessation advised.  Pt voiced understanding of health risks of cont'd tobacco use.  F/u sooner as needed.  Patient was encouraged to keep me informed of any acute changes, lack of improvement, or any new concerning symptoms.  Pt is aware of reasons to seek emergent care.  Patient voiced understanding of all instructions and denied further questions.

## 2020-06-03 ENCOUNTER — OFFICE VISIT (OUTPATIENT)
Dept: FAMILY MEDICINE CLINIC | Facility: CLINIC | Age: 65
End: 2020-06-03

## 2020-06-03 ENCOUNTER — RESULTS ENCOUNTER (OUTPATIENT)
Dept: FAMILY MEDICINE CLINIC | Facility: CLINIC | Age: 65
End: 2020-06-03

## 2020-06-03 VITALS
WEIGHT: 152 LBS | TEMPERATURE: 99.4 F | HEIGHT: 70 IN | BODY MASS INDEX: 21.76 KG/M2 | OXYGEN SATURATION: 96 % | SYSTOLIC BLOOD PRESSURE: 100 MMHG | DIASTOLIC BLOOD PRESSURE: 60 MMHG | HEART RATE: 77 BPM

## 2020-06-03 DIAGNOSIS — Z12.11 SCREENING FOR COLON CANCER: ICD-10-CM

## 2020-06-03 DIAGNOSIS — E53.8 COBALAMIN DEFICIENCY: ICD-10-CM

## 2020-06-03 DIAGNOSIS — H53.9 CHANGE IN VISION: ICD-10-CM

## 2020-06-03 DIAGNOSIS — E08.42 DIABETIC POLYNEUROPATHY ASSOCIATED WITH DIABETES MELLITUS DUE TO UNDERLYING CONDITION (HCC): ICD-10-CM

## 2020-06-03 DIAGNOSIS — Z51.81 MEDICATION MONITORING ENCOUNTER: ICD-10-CM

## 2020-06-03 DIAGNOSIS — R53.82 CHRONIC FATIGUE: ICD-10-CM

## 2020-06-03 DIAGNOSIS — J43.8 OTHER EMPHYSEMA (HCC): ICD-10-CM

## 2020-06-03 DIAGNOSIS — E78.2 MIXED HYPERLIPIDEMIA: ICD-10-CM

## 2020-06-03 DIAGNOSIS — I25.10 CHRONIC CORONARY ARTERY DISEASE: Primary | ICD-10-CM

## 2020-06-03 DIAGNOSIS — M79.2 NEUROPATHIC PAIN SYNDROME (NON-HERPETIC): ICD-10-CM

## 2020-06-03 DIAGNOSIS — E11.9 WELL CONTROLLED DIABETES MELLITUS (HCC): ICD-10-CM

## 2020-06-03 DIAGNOSIS — F17.200 TOBACCO DEPENDENCE SYNDROME: ICD-10-CM

## 2020-06-03 DIAGNOSIS — Z53.20 COLONOSCOPY REFUSED: ICD-10-CM

## 2020-06-03 PROCEDURE — 99214 OFFICE O/P EST MOD 30 MIN: CPT | Performed by: FAMILY MEDICINE

## 2020-06-03 PROCEDURE — 96372 THER/PROPH/DIAG INJ SC/IM: CPT | Performed by: FAMILY MEDICINE

## 2020-06-03 RX ADMIN — CYANOCOBALAMIN 1000 MCG: 1000 INJECTION, SOLUTION INTRAMUSCULAR; SUBCUTANEOUS at 11:17

## 2020-06-03 NOTE — PROGRESS NOTES
1ml cyanocobalamin administered in L deltoid  Patient tolerated well  LOT# [id number]  NDC: 69336-790-26  EXP: 10/21

## 2020-06-03 NOTE — PATIENT INSTRUCTIONS
Advance Care Planning and Advance Directives     You make decisions on a daily basis - decisions about where you want to live, your career, your home, your life. Perhaps one of the most important decisions you face is your choice for future medical care. Take time to talk with your family and your healthcare team and start planning today.  Advance Care Planning is a process that can help you:  · Understand possible future healthcare decisions in light of your own experiences  · Reflect on those decision in light of your goals and values  · Discuss your decisions with those closest to you and the healthcare professionals that care for you  · Make a plan by creating a document that reflects your wishes    Surrogate Decision Maker  In the event of a medical emergency, which has left you unable to communicate or to make your own decisions, you would need someone to make decisions for you.  It is important to discuss your preferences for medical treatment with this person while you are in good health.     Qualities of a surrogate decision maker:  • Willing to take on this role and responsibility  • Knows what you want for future medical care  • Willing to follow your wishes even if they don't agree with them  • Able to make difficult medical decisions under stressful circumstances    Advance Directives  These are legal documents you can create that will guide your healthcare team and decision maker(s) when needed. These documents can be stored in the electronic medical record.    · Living Will - a legal document to guide your care if you have a terminal condition or a serious illness and are unable to communicate. States vary by statute in document names/types, but most forms may include one or more of the following:        -  Directions regarding life-prolonging treatments        -  Directions regarding artificially provided nutrition/hydration        -  Choosing a healthcare decision maker        -  Direction  regarding organ/tissue donation    · Durable Power of  for Healthcare - this document names an -in-fact to make medical decisions for you, but it may also allow this person to make personal and financial decisions for you. Please seek the advice of an  if you need this type of document.    **Advance Directives are not required and no one may discriminate against you if you do not sign one.    Medical Orders  Many states allow specific forms/orders signed by your physician to record your wishes for medical treatment in your current state of health. This form, signed in personal communication with your physician, addresses resuscitation and other medical interventions that you may or may not want.      For more information or to schedule a time with a [facility] Advance Care Planning Facilitator contact: [email or web address] or call [phone] and someone will contact you directly.

## 2020-06-05 PROBLEM — E08.42 DIABETIC POLYNEUROPATHY ASSOCIATED WITH DIABETES MELLITUS DUE TO UNDERLYING CONDITION: Status: ACTIVE | Noted: 2020-06-05

## 2020-06-11 NOTE — TELEPHONE ENCOUNTER
Patient stated he dose take a extra on some days and he had 4-5 pills in his pill box he forgot to bring in for his pill count.     VISITED WITH PT. TOLD HER THAT [facility] CAN ACCEPT HER TOMORROW AS
ARRANGED. SHE SAID THE [facility] ME [facility] COULD GO [facility]STAIRS TO REHAB. EXPLAINED
SINCE SHE DID NOT HAVE ANYONE THAT COULD STAY WITH HER 24/7 AT DISCHARGE FROM
REHAB, REHAB [facility]OULD NOT LIKELY TAKE HER. ALSO EXPLAINED SHE WOULD MOST
LIKELY NOT BE ABLE TO TOLERATE THE AGRESSIVE REHAB THERAPY REGIMEN. CONFIRMED
WITH TALHA/SADIA AND SHE SAID WOULD BE BEST FOR PT.TO GO TO SNF.
PT.NEEDING PAIN MEDS. SEEMED VERY ANXIOUS.

## 2020-06-24 RX ORDER — OMEPRAZOLE 40 MG/1
CAPSULE, DELAYED RELEASE ORAL
Qty: 90 CAPSULE | Refills: 1 | Status: SHIPPED | OUTPATIENT
Start: 2020-06-24 | End: 2020-12-28

## 2020-08-31 ENCOUNTER — OFFICE VISIT (OUTPATIENT)
Dept: FAMILY MEDICINE CLINIC | Facility: CLINIC | Age: 65
End: 2020-08-31

## 2020-08-31 VITALS
SYSTOLIC BLOOD PRESSURE: 100 MMHG | TEMPERATURE: 98.2 F | HEIGHT: 70 IN | WEIGHT: 136 LBS | OXYGEN SATURATION: 95 % | HEART RATE: 78 BPM | BODY MASS INDEX: 19.47 KG/M2 | DIASTOLIC BLOOD PRESSURE: 70 MMHG

## 2020-08-31 DIAGNOSIS — R63.4 ABNORMAL WEIGHT LOSS: ICD-10-CM

## 2020-08-31 DIAGNOSIS — J30.89 NON-SEASONAL ALLERGIC RHINITIS, UNSPECIFIED TRIGGER: ICD-10-CM

## 2020-08-31 DIAGNOSIS — E53.8 COBALAMIN DEFICIENCY: ICD-10-CM

## 2020-08-31 DIAGNOSIS — K14.3 TONGUE COATING: Primary | ICD-10-CM

## 2020-08-31 PROCEDURE — 96372 THER/PROPH/DIAG INJ SC/IM: CPT | Performed by: FAMILY MEDICINE

## 2020-08-31 PROCEDURE — 99214 OFFICE O/P EST MOD 30 MIN: CPT | Performed by: FAMILY MEDICINE

## 2020-08-31 RX ORDER — FLUNISOLIDE 0.25 MG/ML
2 SOLUTION NASAL DAILY
Qty: 1 BOTTLE | Refills: 5 | Status: SHIPPED | OUTPATIENT
Start: 2020-08-31

## 2020-08-31 RX ADMIN — CYANOCOBALAMIN 1000 MCG: 1000 INJECTION, SOLUTION INTRAMUSCULAR; SUBCUTANEOUS at 08:51

## 2020-08-31 NOTE — PROGRESS NOTES
"Subjective   Michael Ned Bridges is a 65 y.o. male.     History of Present Illness   Mr. Bridges presents today c/o \"black\" rash and discoloration on back of his tongue. Began approx 1 week ago after \"eating [employer]\" candy. No pain, no swelling, no trouble swallowing. No fever, no malaise.    Also noted to have lost weight since last visit, 16 lbs over approx 10 weeks. States he gets hungry but not as often. No pain after eating. No change in bowel habits, no abd pain. He has severe chronic pain, often sleeps during day.    Has B12 def, due for f/u injection. Last B12 level 350.    He requests refill of his \"nosespray\". Cont's to have chronic clear rhinorrhea. No facial pain, no purulent drainage.    The following portions of the patient's history were reviewed and updated as appropriate: allergies, current medications, past family history, past medical history, past social history, past surgical history and problem list.    Review of Systems   Constitutional: Positive for fatigue and unexpected weight change. Negative for appetite change and fever.   HENT: Positive for congestion, postnasal drip and rhinorrhea. Negative for mouth sores, nosebleeds, sore throat and trouble swallowing.    Eyes: Negative for visual disturbance.   Respiratory: Negative for cough, shortness of breath and wheezing.    Cardiovascular: Positive for leg swelling (mild, stable). Negative for chest pain and palpitations.   Gastrointestinal: Negative for abdominal pain, constipation, nausea and vomiting.   Endocrine: Positive for cold intolerance. Negative for polydipsia and polyuria.   Genitourinary: Negative for decreased urine volume, dysuria and hematuria.   Musculoskeletal: Positive for arthralgias, back pain, gait problem, myalgias, neck pain and neck stiffness.   Skin: Negative for rash and wound.   Neurological: Positive for dizziness, tremors, weakness, numbness and headaches. Negative for syncope.   Hematological: Negative " for adenopathy. Does not bruise/bleed easily.   Psychiatric/Behavioral: Positive for confusion (mild, intermittent) and sleep disturbance. Negative for dysphoric mood. The patient is nervous/anxious.    Pt's previous ROS reviewed and updated as indicated.       Objective    Vitals:    08/31/20 0832   BP: 100/70   Pulse: 78   Temp: 98.2 °F (36.8 °C)   SpO2: 95%     Body mass index is 19.51 kg/m².      08/31/20  0832   Weight: 61.7 kg (136 lb)         Physical Exam   Constitutional: He is oriented to person, place, and time. He appears well-developed. He appears cachectic. He is cooperative. He does not appear ill. No distress.   HENT:   Head: Normocephalic and atraumatic.   Mouth/Throat: Mucous membranes are not dry. Oral lesions (brown/black discoloration back of tongue, inflammation of papillae, no ulcuerative lesions) present. Abnormal dentition (edentulous). Posterior oropharyngeal erythema (mild) present.   Eyes: Conjunctivae are normal. No scleral icterus.   Cardiovascular: Regular rhythm, normal heart sounds and intact distal pulses. Bradycardia present.   Pulmonary/Chest: Effort normal. No respiratory distress. He has decreased breath sounds. He has no wheezes. He has no rhonchi. He has no rales.     Vascular Status -  His right foot exhibits no edema. His left foot exhibits no edema.  Neurological: He is alert and oriented to person, place, and time.   Skin: Skin is warm and dry. No rash noted.   Psychiatric: He has a normal mood and affect. His behavior is normal.   Nursing note and vitals reviewed.    Lab Results   Component Value Date    [id number] 350 06/12/2019     Lab Results   Component Value Date    WBC 5.61 06/12/2019    HGB 15.1 06/12/2019    HCT 45.6 06/12/2019    MCV 88.2 06/12/2019     06/12/2019       Lab Results   Component Value Date    GLUCOSE 101 (H) 06/16/2018    BUN 12 06/12/2019    CREATININE 0.90 06/12/2019    EGFRIFNONA 85 06/12/2019    EGFRIFAFRI 103 06/12/2019    BCR 13.3  "06/12/2019    K 4.6 06/12/2019    CO2 29.0 06/12/2019    CALCIUM 8.9 06/12/2019    PROTENTOTREF 6.3 06/12/2019    ALBUMIN 3.80 06/12/2019    LABIL2 1.5 06/12/2019    AST 28 06/12/2019    ALT 33 06/12/2019       Lab Results   Component Value Date    CHOL 182 06/13/2016    CHLPL 186 11/19/2018    TRIG 105 11/19/2018    HDL 62 (H) 11/19/2018     (H) 11/19/2018       Lab Results   Component Value Date    HGBA1C 5.3 12/12/2019       Lab Results   Component Value Date    TSH 1.370 08/18/2017           Assessment/Plan   Michael was seen today for rash.    Diagnoses and all orders for this visit:    Tongue coating    Cobalamin deficiency    Abnormal weight loss    Non-seasonal allergic rhinitis, unspecified trigger    Other orders  -     flunisolide (NASALIDE) 25 MCG/ACT (0.025%) solution nasal spray; Inhale 2 sprays Daily.       Suspect \"rash\" is simply inflammation of taste buds with discoloration from tobacco use, poor oral hygiene. Appropriate mouth hygiene reviewed. Instructions for making baking soda paste, brushing back of tongue, etc reviewed. He will keep me informed of lack of improvement.    B12 supplementation per usual.    Recheck weight in 2-4 weeks. He is encouraged to eat small meals throughout the day, simple ways to boost calories reviewed.    continue nasalide as needed for rhinitis.    Patient was encouraged to keep me informed of any acute changes, lack of improvement, or any new concerning symptoms.  Pt is aware of reasons to seek emergent care.  Patient voiced understanding of all instructions and denied further questions.             "

## 2020-10-01 ENCOUNTER — OFFICE VISIT (OUTPATIENT)
Dept: FAMILY MEDICINE CLINIC | Facility: CLINIC | Age: 65
End: 2020-10-01

## 2020-10-01 VITALS
TEMPERATURE: 98.7 F | WEIGHT: 144 LBS | HEIGHT: 70 IN | HEART RATE: 97 BPM | BODY MASS INDEX: 20.62 KG/M2 | DIASTOLIC BLOOD PRESSURE: 60 MMHG | SYSTOLIC BLOOD PRESSURE: 100 MMHG | OXYGEN SATURATION: 98 % | RESPIRATION RATE: 14 BRPM

## 2020-10-01 DIAGNOSIS — E11.9 WELL CONTROLLED DIABETES MELLITUS (HCC): Primary | ICD-10-CM

## 2020-10-01 DIAGNOSIS — Z53.20 COLONOSCOPY REFUSED: ICD-10-CM

## 2020-10-01 DIAGNOSIS — I25.10 CHRONIC CORONARY ARTERY DISEASE: ICD-10-CM

## 2020-10-01 DIAGNOSIS — G89.4 CHRONIC PAIN SYNDROME: ICD-10-CM

## 2020-10-01 DIAGNOSIS — Z23 NEED FOR IMMUNIZATION AGAINST INFLUENZA: ICD-10-CM

## 2020-10-01 DIAGNOSIS — J43.8 OTHER EMPHYSEMA (HCC): ICD-10-CM

## 2020-10-01 DIAGNOSIS — F17.200 TOBACCO DEPENDENCE SYNDROME: ICD-10-CM

## 2020-10-01 PROCEDURE — 99214 OFFICE O/P EST MOD 30 MIN: CPT | Performed by: FAMILY MEDICINE

## 2020-10-01 PROCEDURE — G0008 ADMIN INFLUENZA VIRUS VAC: HCPCS | Performed by: FAMILY MEDICINE

## 2020-10-01 PROCEDURE — 90694 VACC AIIV4 NO PRSRV 0.5ML IM: CPT | Performed by: FAMILY MEDICINE

## 2020-10-01 RX ORDER — BENZONATATE 200 MG/1
200 CAPSULE ORAL 3 TIMES DAILY PRN
Qty: 30 CAPSULE | Refills: 0 | Status: SHIPPED | OUTPATIENT
Start: 2020-10-01 | End: 2020-12-03

## 2020-10-01 NOTE — PROGRESS NOTES
Subjective   Michael Bridges is a 65 y.o. male.     History of Present Illness   Mr. Bridges presents today for routine f/u on several chronic medical problems.      He suffers from chronic pain syndrome due to severe cervical and lumbar degenerative disc disease, severe osteoarthritis of the cervical spine.  Has spinal stenosis in the cervical and lumbar regions.  Status post both cervical and lumbar spine surgeries within the last few years.  Currently being managed by pain specialist.  On Elavil, gabapentin, Percocet, muscle relaxant.  No new focal neurological symptoms.     He has chronic coronary artery disease associated with hyperlipidemia. Currently on Zocor, ACE inhibitor and aspirin.  also followed by cardiology at [facility].  No recent changes in status. Denies chest pain, shortness of breath, palpitations, orthopnea.  He has stable intermittent swelling in the lower legs.     He has COPD/emphysema with chronic tobacco use.  Now down to 1/4 to 1/2 pack of cigarettes/day.  Currently on Combivent with nebulizer as needed.  No recent increase cough, wheeze or dyspnea on exertion. Denies chest pain or hemoptysis.  No fever or weight loss. Does have seasonal allergies which have increase postnasal drip and causing night-time cough. He would like refill of tessalon perls.     He has non-insulin-dependent diabetes mellitus which has been well controlled. Currently on metformin only.  Taking as prescribed.  Denies side effects. Generally follows a diabetic appropriate diet.  No regular exercise due to orthopedic limitations.  Now up-to-date on diabetic eye exam. No reported retinopathy.     He has chronic B12 deficiency.  on oral replacement.      He has not yet performed Cologaurd testing. Has box at home.    Pt's previous HPI reviewed and updated as indicated.     The following portions of the patient's history were reviewed and updated as appropriate: allergies, current  medications, past family history, past medical history, past social history, past surgical history and problem list.    Review of Systems   Constitutional: Positive for fatigue. Negative for appetite change, fever and unexpected weight change.   HENT: Positive for congestion and postnasal drip. Negative for mouth sores, nosebleeds, rhinorrhea, sore throat and trouble swallowing.    Eyes: Negative for visual disturbance.   Respiratory: Positive for cough. Negative for shortness of breath and wheezing.    Cardiovascular: Positive for leg swelling (mild, stable). Negative for chest pain and palpitations.   Gastrointestinal: Negative for abdominal pain, constipation, nausea and vomiting.   Endocrine: Positive for cold intolerance. Negative for polydipsia and polyuria.   Genitourinary: Negative for decreased urine volume, dysuria and hematuria.   Musculoskeletal: Positive for arthralgias, back pain, gait problem, myalgias, neck pain and neck stiffness.   Skin: Negative for rash and wound.   Neurological: Positive for dizziness, tremors, weakness, numbness and headaches. Negative for syncope.   Hematological: Negative for adenopathy. Does not bruise/bleed easily.   Psychiatric/Behavioral: Positive for confusion (mild, intermittent) and sleep disturbance. Negative for dysphoric mood. The patient is nervous/anxious.    Pt's previous ROS reviewed and updated as indicated.       Objective    Vitals:    10/01/20 0904   BP: 100/60   Pulse: 97   Resp: 14   Temp: 98.7 °F (37.1 °C)   SpO2: 98%     Body mass index is 20.66 kg/m².      10/01/20  0904   Weight: 65.3 kg (144 lb)       Physical Exam  Vitals signs and nursing note reviewed.   Constitutional:       Appearance: He is well-developed, well-groomed and normal weight. He is not ill-appearing.   HENT:      Head: Normocephalic and atraumatic.   Eyes:      General: No scleral icterus.  Cardiovascular:      Rate and Rhythm: Normal rate and regular rhythm.      Pulses: Normal  "pulses.      Heart sounds: Heart sounds are distant.   Pulmonary:      Breath sounds: Decreased breath sounds present. No wheezing, rhonchi or rales.   Musculoskeletal:      Right lower leg: No edema.      Left lower leg: No edema.   Skin:     General: Skin is warm and dry.      Coloration: Skin is not cyanotic.      Findings: Ecchymosis (minor on extensor surfaces) present.   Neurological:      Mental Status: He is alert and oriented to person, place, and time.      Gait: Gait abnormal (limp, using single prong cane).   Psychiatric:         Mood and Affect: Mood and affect normal.         Speech: Speech normal.         Behavior: Behavior normal. Behavior is cooperative.         Thought Content: Thought content normal.         Cognition and Memory: Cognition normal.         Judgment: Judgment normal.         Assessment/Plan   Michael was seen today for hyperlipidemia, back pain and insomnia.    Diagnoses and all orders for this visit:    Well controlled diabetes mellitus (CMS/HCC)    Need for immunization against influenza  -     Fluad Quad 65+ yrs ([id number])    Chronic coronary artery disease    Other emphysema (CMS/HCC)    Tobacco dependence syndrome    Colonoscopy refused    Chronic pain syndrome    Other orders  -     benzonatate (TESSALON) 200 MG capsule; Take 1 capsule by mouth 3 (Three) Times a Day As Needed for Cough.       Routine labs as previously ordered.  To be forwarded to Dr. Rico for review as well.    Previously well-controlled NIDDM.  Follow-up A1c.  Adjust treatment as indicated.  Continue metformin.  He is up-to-date on diabetic eye exam.  Continue statin, aspirin, ACE inhibitor.    CAD appearing stable.  Follow-up with [facility] as scheduled.  Currently on enalapril, Zocor, aspirin.    COPD/emphysema stable.  He continues to smoke but is attempting to \"cut down\". Tobacco cessation advised.  Pt voiced understanding of health risks of cont'd tobacco use.  Declines medical " management for smoking cessation at this time.    Exacerbation of seasonal allergies.  Continue nonsedating antihistamine, Tessalon Perles as above.    Follow with pain management for a multifactorial chronic pain syndrome.  Currently stable.  Denies new focal neurological symptoms.    He is once again encouraged to complete Cologuard stool testing for colon cancer screening.    Routine f/u in 3 months for AWV, f/u sooner as needed/instructed.  I will contact patient regarding test results and provide instructions regarding any necessary changes in plan of care.  Patient was encouraged to keep me informed of any acute changes, lack of improvement, or any new concerning symptoms.  Pt is aware of reasons to seek emergent care.  Patient voiced understanding of all instructions and denied further questions.            Please note that portions of this note may have been completed with a voice recognition program. Efforts were made to edit the dictations, but occasionally words are mistranscribed.

## 2020-10-02 LAB
ALBUMIN SERPL-MCNC: 4.5 G/DL (ref 3.5–5.2)
ALBUMIN/CREAT UR: 7 MG/G CREAT (ref 0–29)
ALBUMIN/GLOB SERPL: 2.8 G/DL
ALP SERPL-CCNC: 66 U/L (ref 39–117)
ALT SERPL-CCNC: 10 U/L (ref 1–41)
AST SERPL-CCNC: 18 U/L (ref 1–40)
BILIRUB SERPL-MCNC: 0.4 MG/DL (ref 0–1.2)
BUN SERPL-MCNC: 11 MG/DL (ref 8–23)
BUN/CREAT SERPL: 12.1 (ref 7–25)
CALCIUM SERPL-MCNC: 9.2 MG/DL (ref 8.6–10.5)
CHLORIDE SERPL-SCNC: 102 MMOL/L (ref 98–107)
CHOLEST SERPL-MCNC: 184 MG/DL (ref 0–200)
CO2 SERPL-SCNC: 27.4 MMOL/L (ref 22–29)
CREAT SERPL-MCNC: 0.91 MG/DL (ref 0.76–1.27)
CREAT UR-MCNC: 45.4 MG/DL
ERYTHROCYTE [DISTWIDTH] IN BLOOD BY AUTOMATED COUNT: 14 % (ref 12.3–15.4)
GLOBULIN SER CALC-MCNC: 1.6 GM/DL
GLUCOSE SERPL-MCNC: 112 MG/DL (ref 65–99)
HBA1C MFR BLD: 5.6 % (ref 4.8–5.6)
HCT VFR BLD AUTO: 45.8 % (ref 37.5–51)
HDLC SERPL-MCNC: 59 MG/DL (ref 40–60)
HGB BLD-MCNC: 15.8 G/DL (ref 13–17.7)
LDLC SERPL CALC-MCNC: 101 MG/DL (ref 0–100)
MCH RBC QN AUTO: 29.8 PG (ref 26.6–33)
MCHC RBC AUTO-ENTMCNC: 34.5 G/DL (ref 31.5–35.7)
MCV RBC AUTO: 86.3 FL (ref 79–97)
MICROALBUMIN UR-MCNC: 3.1 UG/ML
PLATELET # BLD AUTO: 252 10*3/MM3 (ref 140–450)
POTASSIUM SERPL-SCNC: 4.4 MMOL/L (ref 3.5–5.2)
PROT SERPL-MCNC: 6.1 G/DL (ref 6–8.5)
RBC # BLD AUTO: 5.31 10*6/MM3 (ref 4.14–5.8)
SODIUM SERPL-SCNC: 138 MMOL/L (ref 136–145)
TRIGL SERPL-MCNC: 119 MG/DL (ref 0–150)
TSH SERPL DL<=0.005 MIU/L-ACNC: 1.12 UIU/ML (ref 0.27–4.2)
VIT B12 SERPL-MCNC: 498 PG/ML (ref 211–946)
VLDLC SERPL CALC-MCNC: 23.8 MG/DL
WBC # BLD AUTO: 5.44 10*3/MM3 (ref 3.4–10.8)

## 2020-11-16 ENCOUNTER — TELEPHONE (OUTPATIENT)
Dept: FAMILY MEDICINE CLINIC | Facility: CLINIC | Age: 65
End: 2020-11-16
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
## 2020-12-03 ENCOUNTER — OFFICE VISIT (OUTPATIENT)
Dept: FAMILY MEDICINE CLINIC | Facility: CLINIC | Age: 65
End: 2020-12-03

## 2020-12-03 VITALS
SYSTOLIC BLOOD PRESSURE: 118 MMHG | OXYGEN SATURATION: 96 % | HEART RATE: 62 BPM | BODY MASS INDEX: 20.66 KG/M2 | DIASTOLIC BLOOD PRESSURE: 70 MMHG | HEIGHT: 70 IN | TEMPERATURE: 97.3 F | RESPIRATION RATE: 14 BRPM

## 2020-12-03 DIAGNOSIS — I25.10 CHRONIC CORONARY ARTERY DISEASE: ICD-10-CM

## 2020-12-03 DIAGNOSIS — J43.8 OTHER EMPHYSEMA (HCC): ICD-10-CM

## 2020-12-03 DIAGNOSIS — E78.2 MIXED HYPERLIPIDEMIA: ICD-10-CM

## 2020-12-03 DIAGNOSIS — E11.9 WELL CONTROLLED DIABETES MELLITUS (HCC): Primary | ICD-10-CM

## 2020-12-03 DIAGNOSIS — Z91.81 AT HIGH RISK FOR INJURY RELATED TO FALL: ICD-10-CM

## 2020-12-03 DIAGNOSIS — M48.062 SPINAL STENOSIS OF LUMBAR REGION WITH NEUROGENIC CLAUDICATION: ICD-10-CM

## 2020-12-03 DIAGNOSIS — R26.9 ABNORMAL GAIT: ICD-10-CM

## 2020-12-03 PROCEDURE — 99214 OFFICE O/P EST MOD 30 MIN: CPT | Performed by: FAMILY MEDICINE
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
## 2020-12-28 RX ORDER — OMEPRAZOLE 40 MG/1
CAPSULE, DELAYED RELEASE ORAL
Qty: 90 CAPSULE | Refills: 1 | Status: SHIPPED | OUTPATIENT
Start: 2020-12-28 | End: 2021-07-01

## 2021-01-06 ENCOUNTER — OFFICE VISIT (OUTPATIENT)
Dept: FAMILY MEDICINE CLINIC | Facility: CLINIC | Age: 66
End: 2021-01-06

## 2021-01-06 VITALS
RESPIRATION RATE: 18 BRPM | OXYGEN SATURATION: 99 % | SYSTOLIC BLOOD PRESSURE: 114 MMHG | HEART RATE: 71 BPM | DIASTOLIC BLOOD PRESSURE: 72 MMHG | WEIGHT: 146.8 LBS | HEIGHT: 70 IN | TEMPERATURE: 97.1 F | BODY MASS INDEX: 21.02 KG/M2

## 2021-01-06 DIAGNOSIS — E78.2 MIXED HYPERLIPIDEMIA: ICD-10-CM

## 2021-01-06 DIAGNOSIS — I25.10 CHRONIC CORONARY ARTERY DISEASE: ICD-10-CM

## 2021-01-06 DIAGNOSIS — E08.42 DIABETIC POLYNEUROPATHY ASSOCIATED WITH DIABETES MELLITUS DUE TO UNDERLYING CONDITION (HCC): ICD-10-CM

## 2021-01-06 DIAGNOSIS — K21.9 GASTROESOPHAGEAL REFLUX DISEASE, UNSPECIFIED WHETHER ESOPHAGITIS PRESENT: ICD-10-CM

## 2021-01-06 DIAGNOSIS — E11.9 WELL CONTROLLED DIABETES MELLITUS (HCC): ICD-10-CM

## 2021-01-06 DIAGNOSIS — J43.8 OTHER EMPHYSEMA (HCC): ICD-10-CM

## 2021-01-06 DIAGNOSIS — G89.4 CHRONIC PAIN SYNDROME: ICD-10-CM

## 2021-01-06 DIAGNOSIS — G25.81 RESTLESS LEGS SYNDROME: ICD-10-CM

## 2021-01-06 DIAGNOSIS — R51.9 CHRONIC DAILY HEADACHE: ICD-10-CM

## 2021-01-06 DIAGNOSIS — E55.9 VITAMIN D DEFICIENCY: ICD-10-CM

## 2021-01-06 DIAGNOSIS — N40.1 BENIGN PROSTATIC HYPERPLASIA WITH LOWER URINARY TRACT SYMPTOMS, SYMPTOM DETAILS UNSPECIFIED: ICD-10-CM

## 2021-01-06 DIAGNOSIS — Z79.891 CHRONIC PRESCRIPTION OPIATE USE: ICD-10-CM

## 2021-01-06 DIAGNOSIS — Z00.00 MEDICARE ANNUAL WELLNESS VISIT, SUBSEQUENT: Primary | ICD-10-CM

## 2021-01-06 DIAGNOSIS — M48.02 SPINAL STENOSIS OF CERVICAL REGION: ICD-10-CM

## 2021-01-06 DIAGNOSIS — E53.8 COBALAMIN DEFICIENCY: ICD-10-CM

## 2021-01-06 DIAGNOSIS — M48.062 SPINAL STENOSIS OF LUMBAR REGION WITH NEUROGENIC CLAUDICATION: ICD-10-CM

## 2021-01-06 DIAGNOSIS — F17.200 TOBACCO DEPENDENCE SYNDROME: ICD-10-CM

## 2021-01-06 PROCEDURE — G0439 PPPS, SUBSEQ VISIT: HCPCS | Performed by: FAMILY MEDICINE

## 2021-01-06 PROCEDURE — 96372 THER/PROPH/DIAG INJ SC/IM: CPT | Performed by: FAMILY MEDICINE

## 2021-01-06 RX ADMIN — CYANOCOBALAMIN 1000 MCG: 1000 INJECTION, SOLUTION INTRAMUSCULAR; SUBCUTANEOUS at 10:51

## 2021-01-06 NOTE — PROGRESS NOTES
The ABCs of the Annual Wellness Visit  Subsequent Medicare Wellness Visit    Chief Complaint   Patient presents with   • Medicare Wellness-subsequent       Subjective   History of Present Illness:  Michael Bridges is a 65 y.o. male who presents for a Subsequent Medicare Wellness Visit.    He suffers from chronic pain syndrome due to severe cervical and lumbar degenerative disc disease, severe osteoarthritis of the cervical spine.  Has spinal stenosis in the cervical and lumbar regions.  Status post both cervical and lumbar spine surgeries within the last few years.  Currently being managed by pain specialist.  On Elavil, gabapentin, Percocet, muscle relaxant.  No new focal neurological symptoms.     He has chronic coronary artery disease associated with hyperlipidemia. Currently on Zocor, ACE inhibitor and aspirin.  also followed by cardiology at [facility].  No recent changes in status. Denies chest pain, shortness of breath, palpitations, orthopnea.  He has stable intermittent swelling in the lower legs.     He has COPD/emphysema with chronic tobacco use.  smoking approx 1/2 pack of cigarettes/day.  Currently on Combivent with nebulizer as needed.  No recent increase cough, wheeze or dyspnea on exertion. Denies chest pain or hemoptysis.  No fever or weight loss.      He has non-insulin-dependent diabetes mellitus which has been well controlled. Currently on metformin only.  Taking as prescribed.  Denies side effects. Generally follows a diabetic appropriate diet.  No regular exercise due to orthopedic limitations.  He is up-to-date on diabetic eye exam as he was seen by Dr. Singh in 2020     He has chronic B12 deficiency.  Due for repeat B12 intramuscular injection.     Has F/u with Dr. Dill 1/12/2021    Pt's previous HPI reviewed and updated as indicated.     HEALTH RISK ASSESSMENT    Recent Hospitalizations:  No hospitalization(s) within the last year.    Current Medical  Providers:  Patient Care Team:  Diana Priest MD as PCP - General (Family Medicine)  Breezy Noel II, MD as Consulting Physician (Pain Medicine)  Cheryl Marin MD as Consulting Physician (Cardiology)    Smoking Status:  Social History     Tobacco Use   Smoking Status Current Every Day Smoker   • Packs/day: 0.50   • Years: 57.00   • Pack years: 28.50   • Types: Cigarettes   Smokeless Tobacco Former User   • Types: Chew, Snuff   Tobacco Comment    REPORTS HAS SMOKED UP TO 2-3 PPD AND HAS CUT BACK TO 1/2 PPD       Alcohol Consumption:  Social History     Substance and Sexual Activity   Alcohol Use No       Depression Screen:   PHQ-2/PHQ-9 Depression Screening 3/12/2019   Little interest or pleasure in doing things 0   Feeling down, depressed, or hopeless 0   Trouble falling or staying asleep, or sleeping too much -   Feeling tired or having little energy -   Poor appetite or overeating -   Feeling bad about yourself - or that you are a failure or have let yourself or your family down -   Trouble concentrating on things, such as reading the newspaper or watching television -   Moving or speaking so slowly that other people could have noticed. Or the opposite - being so fidgety or restless that you have been moving around a lot more than usual -   Thoughts that you would be better off dead, or of hurting yourself in some way -   Total Score 0   If you checked off any problems, how difficult have these problems made it for you to do your work, take care of things at home, or get along with other people? -       Fall Risk Screen:  STEADI Fall Risk Assessment was completed, and patient is at MODERATE risk for falls. Assessment completed on:1/6/2021    Health Habits and Functional and Cognitive Screening:  Functional & Cognitive Status 1/6/2021   Do you have difficulty preparing food and eating? No   Do you have difficulty bathing yourself, getting dressed or grooming yourself? No   Do you have difficulty  using the toilet? No   Do you have difficulty moving around from place to place? No   Do you have trouble with steps or getting out of a bed or a chair? Yes   Current Diet Diabetic Diet   Dental Exam Up to date   Eye Exam Up to date   Exercise (times per week) 7 times per week   Current Exercise Activities Include Walking   Do you need help using the phone?  No   Are you deaf or do you have serious difficulty hearing?  No   Do you need help with transportation? No   Do you need help shopping? No   Do you need help preparing meals?  No   Do you need help with housework?  No   Do you need help with laundry? No   Do you need help taking your medications? No   Do you need help managing money? No   Do you ever drive or ride in a car without wearing a seat belt? No   Have you felt unusual stress, anger or loneliness in the last month? No   Who do you live with? Spouse   If you need help, do you have trouble finding someone available to you? No   Have you been bothered in the last four weeks by sexual problems? No   Do you have difficulty concentrating, remembering or making decisions? No         Does the patient have evidence of cognitive impairment? No    Asprin use counseling:Taking ASA appropriately as indicated    Age-appropriate Screening Schedule:  Refer to the list below for future screening recommendations based on patient's age, sex and/or medical conditions. Orders for these recommended tests are listed in the plan section. The patient has been provided with a written plan.    Health Maintenance   Topic Date Due   • ZOSTER VACCINE (1 of 2) 02/02/2005   • DXA SCAN  07/21/2016   • DIABETIC EYE EXAM  08/14/2019   • DIABETIC FOOT EXAM  03/12/2020   • HEMOGLOBIN A1C  04/01/2021   • LIPID PANEL  10/01/2021   • URINE MICROALBUMIN  10/01/2021   • TDAP/TD VACCINES (3 - Td) 01/17/2028   • INFLUENZA VACCINE  Completed          The following portions of the patient's history were reviewed and updated as appropriate:  allergies, current medications, past family history, past medical history, past social history, past surgical history and problem list.    Outpatient Medications Prior to Visit   Medication Sig Dispense Refill   • amitriptyline (ELAVIL) 25 MG tablet Take 1 tablet by mouth Every Night. Take two pills at supper time 60 tablet 3   • aspirin 81 MG EC tablet Take 1 tablet by mouth Daily. 30 tablet 5   • cyclobenzaprine (FLEXERIL) 10 MG tablet Take 1 tablet by mouth 3 (Three) Times a Day As Needed for muscle spasms. 90 tablet 2   • diphenhydrAMINE (BENADRYL) 25 mg capsule Take 25 mg by mouth Every 6 (Six) Hours As Needed for Itching (RHINITIS).     • divalproex (DEPAKOTE) 250 MG DR tablet 1 po bid (Patient taking differently: Take 250 mg by mouth 2 (Two) Times a Day. 1 po bid) 60 tablet 2   • enalapril (VASOTEC) 10 MG tablet   5   • flunisolide (NASALIDE) 25 MCG/ACT (0.025%) solution nasal spray Inhale 2 sprays Daily. 1 bottle 5   • gabapentin (NEURONTIN) 600 MG tablet Take 1 tablet by mouth 3 (Three) Times a Day. 90 tablet 0   • ipratropium-albuterol (COMBIVENT RESPIMAT)  MCG/ACT inhaler Inhale 1 puff 4 (Four) Times a Day. 4 g 2   • mirtazapine (REMERON) 30 MG tablet Take 1 tablet by mouth Every Night. 30 tablet 2   • naloxone (NARCAN) 4 MG/0.1ML nasal spray 1 spray into each nostril As Needed (suspected overdose). May repeat with 2nd device in opposite nostril if minimal response in 2-3 minutes 2 each 2   • omeprazole (priLOSEC) 40 MG capsule TAKE 1 CAPSULE BY MOUTH EVERY DAY 90 capsule 1   • oxyCODONE-acetaminophen (PERCOCET) 7.5-325 MG per tablet TK 1 T PO Q 8 H  0   • Polyethylene Glycol 3350 granules MIX 1 CAPFUL (17GM) IN 8 OUNCES OF WATER, JUICE, OR TEA AND DRINK TWICE DAILY AS NEEDED FOR CONSTIPATION (Patient taking differently: Take 1 package by mouth Daily. MIX 1 CAPFUL (17GM) IN 8 OUNCES OF WATER, JUICE, OR TEA AND DRINK TWICE DAILY AS NEEDED FOR CONSTIPATION) 1 bottle 5   • simvastatin (ZOCOR) 20 MG  tablet Take 20 mg by mouth Every Night.       Facility-Administered Medications Prior to Visit   Medication Dose Route Frequency Provider Last Rate Last Admin   • cyanocobalamin injection 1,000 mcg  1,000 mcg Intramuscular Weekly Diana Priest MD   1,000 mcg at 01/06/21 1051       Patient Active Problem List   Diagnosis   • Atopic rhinitis   • Osteoarthritis of cervical spine   • Chronic pain syndrome   • Well controlled diabetes mellitus (CMS/HCC)   • Chronic coronary artery disease   • Degeneration of intervertebral disc of cervical region   • Gastroesophageal reflux disease   • Abnormal gait   • Hyperlipidemia   • Insomnia   • Degeneration of intervertebral disc of lumbar region   • Lumbar radiculopathy   • Spinal stenosis of lumbar region   • Neuropathic pain syndrome (non-herpetic)   • Peripheral neuropathy   • Pulmonary emphysema (CMS/HCC)   • Restless legs syndrome   • Spinal stenosis of cervical region   • Tobacco dependence syndrome   • Tremor   • Cobalamin deficiency   • Vitamin D deficiency   • Constipation   • Muscle spasm   • Nodule of right lung   • Chronic daily headache   • BPH (benign prostatic hypertrophy)   • Congenital duplication of renal collecting system   • Chronic back pain   • Screening for colon cancer   • Colonoscopy refused   • Chronic prescription opiate use   • Diabetic polyneuropathy associated with diabetes mellitus due to underlying condition (CMS/HCC)       Advanced Care Planning:  ACP discussion was held with the patient during this visit. Patient does not have an advance directive, information provided.    Review of Systems   Constitutional: Positive for fatigue. Negative for appetite change, fever and unexpected weight change.   HENT: Positive for congestion and postnasal drip. Negative for mouth sores, nosebleeds, rhinorrhea, sore throat and trouble swallowing.    Eyes: Negative for visual disturbance.   Respiratory: Positive for cough. Negative for shortness of breath and  "wheezing.    Cardiovascular: Positive for leg swelling (mild, stable). Negative for chest pain and palpitations.   Gastrointestinal: Negative for abdominal pain, constipation, nausea and vomiting.   Endocrine: Positive for cold intolerance. Negative for polydipsia and polyuria.   Genitourinary: Negative for decreased urine volume, dysuria and hematuria.   Musculoskeletal: Positive for arthralgias, back pain, gait problem, myalgias, neck pain and neck stiffness.   Skin: Negative for rash and wound.   Neurological: Positive for dizziness, tremors, weakness, numbness and headaches. Negative for syncope.   Hematological: Negative for adenopathy. Does not bruise/bleed easily.   Psychiatric/Behavioral: Positive for confusion (mild, intermittent) and sleep disturbance. Negative for dysphoric mood. The patient is nervous/anxious.    Pt's previous ROS reviewed and updated as indicated.       Compared to one year ago, the patient feels his physical health is worse.  Compared to one year ago, the patient feels his mental health is the same.    Reviewed chart for potential of high risk medication in the elderly: yes  Reviewed chart for potential of harmful drug interactions in the elderly:yes    Objective         Vitals:    01/06/21 1018   BP: 114/72   BP Location: Right arm   Patient Position: Sitting   Cuff Size: Adult   Pulse: 71   Resp: 18   Temp: 97.1 °F (36.2 °C)   SpO2: 99%   Weight: 66.6 kg (146 lb 12.8 oz)   Height: 177.8 cm (70\")       Body mass index is 21.06 kg/m².  Discussed the patient's BMI with him. The BMI is in the acceptable range.    Physical Exam  Vitals signs and nursing note reviewed.   Constitutional:       General: He is not in acute distress.     Appearance: He is well-developed, well-groomed and normal weight. He is not ill-appearing.   HENT:      Head: Normocephalic and atraumatic.   Eyes:      General: No scleral icterus.     Extraocular Movements: Extraocular movements intact.      " Conjunctiva/sclera: Conjunctivae normal.   Neck:      Vascular: Normal carotid pulses. No carotid bruit.   Cardiovascular:      Rate and Rhythm: Normal rate and regular rhythm.      Pulses: Normal pulses.      Heart sounds: Heart sounds are distant.   Pulmonary:      Effort: Pulmonary effort is normal.      Breath sounds: Decreased breath sounds present. No wheezing, rhonchi or rales.   Musculoskeletal:      Cervical back: He exhibits decreased range of motion.      Thoracic back: He exhibits decreased range of motion.      Right lower leg: No edema.      Left lower leg: No edema.   Lymphadenopathy:      Cervical: No cervical adenopathy.   Skin:     General: Skin is warm and dry.      Coloration: Skin is not jaundiced or pale.   Neurological:      Mental Status: He is alert and oriented to person, place, and time. Mental status is at baseline.      Motor: Weakness (lower extremities, appears at baseline) present.      Gait: Gait abnormal (antalgic, requires assistance).   Psychiatric:         Mood and Affect: Mood normal.         Speech: Speech normal.         Behavior: Behavior normal. Behavior is cooperative.         Thought Content: Thought content normal.         Cognition and Memory: Cognition normal.         Judgment: Judgment normal.     Pt's previous physical exam reviewed and updated as indicated.          Lab Results   Component Value Date    WBC 5.44 10/01/2020    HGB 15.8 10/01/2020    HCT 45.8 10/01/2020    MCV 86.3 10/01/2020     10/01/2020       Lab Results   Component Value Date    GLUCOSE 101 (H) 06/16/2018    BUN 11 10/01/2020    CREATININE 0.91 10/01/2020    EGFRIFNONA 84 10/01/2020    EGFRIFAFRI 101 10/01/2020    BCR 12.1 10/01/2020    K 4.4 10/01/2020    CO2 27.4 10/01/2020    CALCIUM 9.2 10/01/2020    PROTENTOTREF 6.1 10/01/2020    ALBUMIN 4.50 10/01/2020    LABIL2 2.8 10/01/2020    AST 18 10/01/2020    ALT 10 10/01/2020       Lab Results   Component Value Date    CHOL 182 06/13/2016     CHLPL 184 10/01/2020    TRIG 119 10/01/2020    HDL 59 10/01/2020     (H) 10/01/2020       Lab Results   Component Value Date    TSH 1.120 10/01/2020       Lab Results   Component Value Date    HGBA1C 5.60 10/01/2020    HGBA1C 5.3 12/12/2019    HGBA1C 5.5 06/12/2019     Lab Results   Component Value Date    MICROALBUR 3.1 10/01/2020    CREATININE 0.91 10/01/2020         Assessment/Plan   Medicare Risks and Personalized Health Plan  CMS Preventative Services Quick Reference  Advance Directive Discussion  Cardiovascular risk  Chronic Pain   Depression/Dysphoria  Fall Risk  Hearing Problem  Immunizations Discussed/Encouraged (specific immunizations; Shingrix )  Inactivity/Sedentary  Lung Cancer Risk  Osteoprorosis Risk  Prostate Cancer Screening   Tobacco Use/Dependance (use dotphrase .tobaccocessation for documentation)    The above risks/problems have been discussed with the patient.  Pertinent information has been shared with the patient in the After Visit Summary.  Follow up plans and orders are seen below in the Assessment/Plan Section.    Diagnoses and all orders for this visit:    1. Medicare annual wellness visit, subsequent (Primary)    2. Well controlled diabetes mellitus (CMS/[facility])    3. Cobalamin deficiency    4. Chronic coronary artery disease    5. Mixed hyperlipidemia    6. Vitamin D deficiency    7. Gastroesophageal reflux disease, unspecified whether esophagitis present    8. Benign prostatic hyperplasia with lower urinary tract symptoms, symptom details unspecified    9. Tobacco dependence syndrome    10. Other emphysema (CMS/[facility])    11. Diabetic polyneuropathy associated with diabetes mellitus due to underlying condition (CMS/[facility])    12. Chronic pain syndrome    13. Chronic daily headache    14. Chronic prescription opiate use      Chronic conditions appear stable.  Pt advised to eat a heart healthy diet and get regular aerobic exercise a able based on orthopedic limitations.  Tobacco cessation  advised.  Pt voiced understanding of health risks of cont'd tobacco use. He declines smoking cessation at this time.    Follow Up:  Return in about 6 months (around 7/6/2021).   F/u sooner as needed.  Encouraged to keep regular f/u with specialists.  Patient was encouraged to keep me informed of any acute changes, lack of improvement, or any new concerning symptoms.  Pt is aware of reasons to seek emergent care.  Patient voiced understanding of all instructions and denied further questions.    An After Visit Summary and PPPS were given to the patient.

## 2021-01-06 NOTE — PATIENT INSTRUCTIONS
Advance Care Planning and Advance Directives     You make decisions on a daily basis - decisions about where you want to live, your career, your home, your life. Perhaps one of the most important decisions you face is your choice for future medical care. Take time to talk with your family and your healthcare team and start planning today.  Advance Care Planning is a process that can help you:  · Understand possible future healthcare decisions in light of your own experiences  · Reflect on those decision in light of your goals and values  · Discuss your decisions with those closest to you and the healthcare professionals that care for you  · Make a plan by creating a document that reflects your wishes    Surrogate Decision Maker  In the event of a medical emergency, which has left you unable to communicate or to make your own decisions, you would need someone to make decisions for you.  It is important to discuss your preferences for medical treatment with this person while you are in good health.     Qualities of a surrogate decision maker:  • Willing to take on this role and responsibility  • Knows what you want for future medical care  • Willing to follow your wishes even if they don't agree with them  • Able to make difficult medical decisions under stressful circumstances    Advance Directives  These are legal documents you can create that will guide your healthcare team and decision maker(s) when needed. These documents can be stored in the electronic medical record.    · Living Will - a legal document to guide your care if you have a terminal condition or a serious illness and are unable to communicate. States vary by statute in document names/types, but most forms may include one or more of the following:        -  Directions regarding life-prolonging treatments        -  Directions regarding artificially provided nutrition/hydration        -  Choosing a healthcare decision maker        -  Direction  regarding organ/tissue donation    · Durable Power of  for Healthcare - this document names an -in-fact to make medical decisions for you, but it may also allow this person to make personal and financial decisions for you. Please seek the advice of an  if you need this type of document.    **Advance Directives are not required and no one may discriminate against you if you do not sign one.    Medical Orders  Many states allow specific forms/orders signed by your physician to record your wishes for medical treatment in your current state of health. This form, signed in personal communication with your physician, addresses resuscitation and other medical interventions that you may or may not want.      For more information or to schedule a time with a [facility] Advance Care Planning Facilitator contact: [facility] or call [phone] and someone will contact you directly.    Preventive Care 65 Years and Older, Male  Preventive care refers to lifestyle choices and visits with your health care provider that can promote health and wellness. This includes:  · A yearly physical exam. This is also called an annual well check.  · Regular dental and eye exams.  · Immunizations.  · Screening for certain conditions.  · Healthy lifestyle choices, such as diet and exercise.  What can I expect for my preventive care visit?  Physical exam  Your health care provider will check:  · Height and weight. These may be used to calculate body mass index (BMI), which is a measurement that tells if you are at a healthy weight.  · Heart rate and blood pressure.  · Your skin for abnormal spots.  Counseling  Your health care provider may ask you questions about:  · Alcohol, tobacco, and drug use.  · Emotional well-being.  · Home and relationship well-being.  · Sexual activity.  · Eating habits.  · History of falls.  · Memory and ability to understand (cognition).  · Work and work environment.  What  immunizations do I need?    Influenza (flu) vaccine  · This is recommended every year.  Tetanus, diphtheria, and pertussis (Tdap) vaccine  · You may need a Td booster every 10 years.  Varicella (chickenpox) vaccine  · You may need this vaccine if you have not already been vaccinated.  Zoster (shingles) vaccine  · You may need this after age 60.  Pneumococcal conjugate (PCV13) vaccine  · One dose is recommended after age 65.  Pneumococcal polysaccharide (PPSV23) vaccine  · One dose is recommended after age 65.  Measles, mumps, and rubella (MMR) vaccine  · You may need at least one dose of MMR if you were born in 1957 or later. You may also need a second dose.  Meningococcal conjugate (MenACWY) vaccine  · You may need this if you have certain conditions.  Hepatitis A vaccine  · You may need this if you have certain conditions or if you travel or work in places where you may be exposed to hepatitis A.  Hepatitis B vaccine  · You may need this if you have certain conditions or if you travel or work in places where you may be exposed to hepatitis B.  Haemophilus influenzae type b (Hib) vaccine  · You may need this if you have certain conditions.  You may receive vaccines as individual doses or as more than one vaccine together in one shot (combination vaccines). Talk with your health care provider about the risks and benefits of combination vaccines.  What tests do I need?  Blood tests  · Lipid and cholesterol levels. These may be checked every 5 years, or more frequently depending on your overall health.  · Hepatitis C test.  · Hepatitis B test.  Screening  · Lung cancer screening. You may have this screening every year starting at age 55 if you have a 30-pack-year history of smoking and currently smoke or have quit within the past 15 years.  · Colorectal cancer screening. All adults should have this screening starting at age 50 and continuing until age 75. Your health care provider may recommend screening at age 45 if  you are at increased risk. You will have tests every 1-10 years, depending on your results and the type of screening test.  · Prostate cancer screening. Recommendations will vary depending on your family history and other risks.  · Diabetes screening. This is done by checking your blood sugar (glucose) after you have not eaten for a while (fasting). You may have this done every 1-3 years.  · Abdominal aortic aneurysm (AAA) screening. You may need this if you are a current or former smoker.  · Sexually transmitted disease (STD) testing.  Follow these instructions at home:  Eating and drinking  · Eat a diet that includes fresh fruits and vegetables, whole grains, lean protein, and low-fat dairy products. Limit your intake of foods with high amounts of sugar, saturated fats, and salt.  · Take vitamin and mineral supplements as recommended by your health care provider.  · Do not drink alcohol if your health care provider tells you not to drink.  · If you drink alcohol:  ? Limit how much you have to 0-2 drinks a day.  ? Be aware of how much alcohol is in your drink. In the [place] one drink equals one 12 oz bottle of beer (355 mL), one 5 oz glass of wine (148 mL), or one 1½ oz glass of hard liquor (44 mL).  Lifestyle  · Take daily care of your teeth and gums.  · Stay active. Exercise for at least 30 minutes on 5 or more days each week.  · Do not use any products that contain nicotine or tobacco, such as cigarettes, e-cigarettes, and chewing tobacco. If you need help quitting, ask your health care provider.  · If you are sexually active, practice safe sex. Use a condom or other form of protection to prevent STIs (sexually transmitted infections).  · Talk with your health care provider about taking a low-dose aspirin or statin.  What's next?  · Visit your health care provider once a year for a well check visit.  · Ask your health care provider how often you should have your eyes and teeth checked.  · Stay up to date on all  vaccines.  This information is not intended to replace advice given to you by your health care provider. Make sure you discuss any questions you have with your health care provider.  Document Revised: 2019 Document Reviewed: 2019  Elsevier Patient Education ©  [employer]      Medicare Wellness  Personal Prevention Plan of Service     Date of Office Visit:  2021  Encounter Provider:  Diana Priest MD  Place of Service:  [facility]  Patient Name: Michael Bridges  :  1955    As part of the Medicare Wellness portion of your visit today, we are providing you with this personalized preventive plan of services (PPPS). This plan is based upon recommendations of the [place] Preventive Services Task Force (USPSTF) and the Advisory Committee on Immunization Practices (ACIP).    This lists the preventive care services that should be considered, and provides dates of when you are due. Items listed as completed are up-to-date and do not require any further intervention.    Health Maintenance   Topic Date Due   • ZOSTER VACCINE (1 of 2) 2005   • ANNUAL WELLNESS VISIT  2016   • DXA SCAN  2016   • DIABETIC EYE EXAM  2019   • DIABETIC FOOT EXAM  2020   • HEMOGLOBIN A1C  2021   • LIPID PANEL  10/01/2021   • URINE MICROALBUMIN  10/01/2021   • COLOGUARD  2022   • Pneumococcal Vaccine 65+ (2 of 2 - PPSV23) 2023   • TDAP/TD VACCINES (3 - Td) 2028   • HEPATITIS C SCREENING  Completed   • INFLUENZA VACCINE  Completed   • AAA SCREEN (ONE-TIME)  Completed   • MENINGOCOCCAL VACCINE  Aged Out       No orders of the defined types were placed in this encounter.      Return in about 6 months (around 2021).

## 2021-01-21 RX ORDER — SIMVASTATIN 20 MG
20 TABLET ORAL NIGHTLY
Qty: 30 TABLET | Refills: 11 | Status: SHIPPED | OUTPATIENT
Start: 2021-01-21 | End: 2023-02-01 | Stop reason: SDUPTHER

## 2021-01-21 NOTE — TELEPHONE ENCOUNTER
Caller: CyrusAnabelle    Relationship: Emergency Contact    Best call back number: [phone]    Medication needed:   Requested Prescriptions     Pending Prescriptions Disp Refills   • simvastatin (ZOCOR) 20 MG tablet        Sig: Take 1 tablet by mouth Every Night.       When do you need the refill by: 1/21/21    What details did the patient provide when requesting the medication:     Does the patient have less than a 3 day supply:  [x] Yes  [] No    What is the patient's preferred pharmacy: [id number] - [place], [place] - [address] - [phone] [facility] [phone] FX

## 2021-02-12 ENCOUNTER — TELEPHONE (OUTPATIENT)
Dept: FAMILY MEDICINE CLINIC | Facility: CLINIC | Age: 66
End: 2021-02-12

## 2021-02-12 NOTE — TELEPHONE ENCOUNTER
ALEAH CALLED AND STATED PATIENT WANTED TO KNOW IF HE SHOULD TAKE THE COVID VACCINE AND IF HE WAS IN GOOD ENOUGH HEALTH TO TAKE IT.    PLEASE CONTACT PATIENT TO ADVISE.    CALLBACK:  [phone]

## 2021-03-23 ENCOUNTER — TELEPHONE (OUTPATIENT)
Dept: FAMILY MEDICINE CLINIC | Facility: CLINIC | Age: 66
End: 2021-03-23

## 2021-03-23 NOTE — TELEPHONE ENCOUNTER
Caller: Cyrus,Anabelle    Relationship: Emergency Contact    Best call back number:[phone]    What orders are you requesting (i.e. lab or imaging): MRI OF THE BACK     In what timeframe would the patient need to come in: ASAP    Where will you receive your lab/imaging services: IN OFFICE     Additional notes:PATIENT WAS SEEN BY DR. GALAN TODAY AND WANTS THE PATIENT TO START PHYSICAL THERAPY BEFORE HE WILL ORDER THE MRI. PATIENTS WIFE WANTS DR. NÚÑEZ TO ORDER THE MRI. PATIENTS WIFE SAYS HE HAS ALREADY TRIED PHYSICAL THERAPY AND IT DOES NOT WORK PLEASE ADVISE

## 2021-03-24 NOTE — TELEPHONE ENCOUNTER
He will need to follow the advise of his specialist as this is often insurance requirement prior to coverage of MRI, other services etc.

## 2021-07-01 RX ORDER — OMEPRAZOLE 40 MG/1
CAPSULE, DELAYED RELEASE ORAL
Qty: 90 CAPSULE | Refills: 1 | Status: SHIPPED | OUTPATIENT
Start: 2021-07-01 | End: 2022-01-05

## 2021-07-06 ENCOUNTER — OFFICE VISIT (OUTPATIENT)
Dept: FAMILY MEDICINE CLINIC | Facility: CLINIC | Age: 66
End: 2021-07-06

## 2021-07-06 VITALS
DIASTOLIC BLOOD PRESSURE: 74 MMHG | BODY MASS INDEX: 22.6 KG/M2 | HEART RATE: 56 BPM | SYSTOLIC BLOOD PRESSURE: 124 MMHG | OXYGEN SATURATION: 95 % | WEIGHT: 157.5 LBS | RESPIRATION RATE: 16 BRPM

## 2021-07-06 DIAGNOSIS — J43.8 OTHER EMPHYSEMA (HCC): ICD-10-CM

## 2021-07-06 DIAGNOSIS — Z12.11 SCREENING FOR COLON CANCER: ICD-10-CM

## 2021-07-06 DIAGNOSIS — E53.8 COBALAMIN DEFICIENCY: ICD-10-CM

## 2021-07-06 DIAGNOSIS — E55.9 VITAMIN D DEFICIENCY: ICD-10-CM

## 2021-07-06 DIAGNOSIS — I25.10 CHRONIC CORONARY ARTERY DISEASE: ICD-10-CM

## 2021-07-06 DIAGNOSIS — Z51.81 MEDICATION MONITORING ENCOUNTER: ICD-10-CM

## 2021-07-06 DIAGNOSIS — F17.200 ENCOUNTER FOR SCREENING FOR MALIGNANT NEOPLASM OF LUNG IN CURRENT SMOKER WITH 30 PACK YEAR HISTORY OR GREATER: ICD-10-CM

## 2021-07-06 DIAGNOSIS — E11.9 WELL CONTROLLED DIABETES MELLITUS (HCC): Primary | ICD-10-CM

## 2021-07-06 DIAGNOSIS — E78.2 MIXED HYPERLIPIDEMIA: ICD-10-CM

## 2021-07-06 DIAGNOSIS — Z12.2 ENCOUNTER FOR SCREENING FOR MALIGNANT NEOPLASM OF LUNG IN CURRENT SMOKER WITH 30 PACK YEAR HISTORY OR GREATER: ICD-10-CM

## 2021-07-06 DIAGNOSIS — Z87.891 PERSONAL HISTORY OF SMOKING: ICD-10-CM

## 2021-07-06 DIAGNOSIS — F17.200 TOBACCO DEPENDENCE SYNDROME: ICD-10-CM

## 2021-07-06 PROCEDURE — 99214 OFFICE O/P EST MOD 30 MIN: CPT | Performed by: FAMILY MEDICINE

## 2021-07-06 RX ORDER — IPRATROPIUM/ALBUTEROL SULFATE 20-100 MCG
1 MIST INHALER (GRAM) INHALATION 4 TIMES DAILY
Qty: 4 G | Refills: 2 | Status: SHIPPED | OUTPATIENT
Start: 2021-07-06 | End: 2022-01-12 | Stop reason: SDUPTHER

## 2021-07-06 NOTE — PROGRESS NOTES
Subjective   Michael Bridges is a 66 y.o. male.     History of Present Illness   Mr. Bridges presents today for routine follow-up on several chronic medical problems.    He suffers from chronic pain syndrome due to severe cervical and lumbar degenerative disc disease, severe osteoarthritis of the cervical spine.  Has spinal stenosis in the cervical and lumbar regions.  Status post both cervical and lumbar spine surgeries. Currently being managed by pain specialist.  On Elavil, gabapentin, Percocet, muscle relaxant.  No new focal neurological symptoms.     He has chronic coronary artery disease associated with hyperlipidemia. Currently on Zocor, ACE inhibitor and aspirin.  Followed by cardiology at [facility].  No recent changes in status or plan of care. Denies chest pain, shortness of breath, palpitations, orthopnea.  He has stable intermittent swelling in the lower legs.     He has COPD/emphysema with chronic tobacco use.  smoking approx 1 pack of cigarettes/day.  Currently on Combivent with nebulizer as needed.  No recent increase cough, wheeze or dyspnea on exertion. Denies chest pain or hemoptysis.  No fever or weight loss.      He has non-insulin-dependent diabetes mellitus which has been well controlled. Currently not requiring medication. Generally follows a diabetic appropriate diet.  No regular exercise due to orthopedic limitations.  He is up-to-date on diabetic eye exam as he was seen by Dr. Singh in 2020 he believes.     He has chronic B12 deficiency.  taking oral replacement.    Pt's previous HPI reviewed and updated as indicated.     The following portions of the patient's history were reviewed and updated as appropriate: allergies, current medications, past family history, past medical history, past social history, past surgical history and problem list.    Review of Systems   Constitutional: Positive for fatigue. Negative for appetite change, fever and unexpected weight  change.   HENT: Positive for congestion and postnasal drip. Negative for mouth sores, nosebleeds, rhinorrhea, sore throat and trouble swallowing.    Eyes: Negative for visual disturbance.   Respiratory: Positive for cough. Negative for shortness of breath and wheezing.    Cardiovascular: Positive for leg swelling (mild, stable). Negative for chest pain and palpitations.   Gastrointestinal: Negative for abdominal pain, constipation, nausea and vomiting.   Endocrine: Positive for cold intolerance. Negative for polydipsia and polyuria.   Genitourinary: Negative for decreased urine volume, dysuria and hematuria.   Musculoskeletal: Positive for arthralgias, back pain, gait problem, myalgias, neck pain and neck stiffness.   Skin: Negative for rash and wound.   Neurological: Positive for dizziness, tremors, weakness, numbness and headaches. Negative for syncope.   Hematological: Negative for adenopathy. Does not bruise/bleed easily.   Psychiatric/Behavioral: Positive for confusion (mild, intermittent) and sleep disturbance. Negative for dysphoric mood. The patient is nervous/anxious.    Pt's previous ROS reviewed and updated as indicated.       Objective    Vitals:    07/06/21 0846   BP: 124/74   Pulse: 56   Resp: 16   SpO2: 95%     Body mass index is 22.6 kg/m².      07/06/21  0846   Weight: 71.4 kg (157 lb 8 oz)       Physical Exam  Vitals and nursing note reviewed.   Constitutional:       General: He is not in acute distress.     Appearance: He is well-developed, well-groomed and normal weight. He is not ill-appearing.   HENT:      Head: Normocephalic and atraumatic.   Eyes:      General: No scleral icterus.     Extraocular Movements: Extraocular movements intact.      Conjunctiva/sclera: Conjunctivae normal.   Neck:      Vascular: Normal carotid pulses. No carotid bruit.   Cardiovascular:      Rate and Rhythm: Normal rate and regular rhythm.      Pulses: Normal pulses.      Heart sounds: Heart sounds are distant.    Pulmonary:      Effort: Pulmonary effort is normal.      Breath sounds: Decreased breath sounds present. No wheezing, rhonchi or rales.   Musculoskeletal:      Thoracic back: Decreased range of motion.      Right lower leg: No edema.      Left lower leg: No edema.   Lymphadenopathy:      Cervical: No cervical adenopathy.   Skin:     General: Skin is warm and dry.      Coloration: Skin is not jaundiced or pale.   Neurological:      Mental Status: He is alert and oriented to person, place, and time. Mental status is at baseline.      Motor: Weakness (lower extremities, appears at baseline) present.      Gait: Gait abnormal (antalgic, requires assistance).   Psychiatric:         Mood and Affect: Mood normal.         Speech: Speech normal.         Behavior: Behavior normal. Behavior is cooperative.         Thought Content: Thought content normal.         Cognition and Memory: Cognition normal.         Judgment: Judgment normal.     Pt's previous physical exam reviewed and updated as indicated.      Assessment/Plan   Diagnoses and all orders for this visit:    1. Well controlled diabetes mellitus (CMS/HCC) (Primary)  -     Comprehensive Metabolic Panel  -     Hemoglobin A1c    2. Chronic coronary artery disease  -     CBC & Differential  -     Comprehensive Metabolic Panel    3. Mixed hyperlipidemia  -     Comprehensive Metabolic Panel  -     Lipid Panel    4. Cobalamin deficiency  -     CBC & Differential  -     Vitamin B12    5. Vitamin D deficiency  -     Vitamin D 25 Hydroxy    6. Screening for colon cancer  -     Cologuard - Stool, Per Rectum; Future    7. Other emphysema (CMS/HCC)  -     CBC & Differential  -     ipratropium-albuterol (Combivent Respimat)  MCG/ACT inhaler; Inhale 1 puff 4 (Four) Times a Day.  Dispense: 4 g; Refill: 2    8. Tobacco dependence syndrome    9. Medication monitoring encounter  -     CBC & Differential  -     Comprehensive Metabolic Panel    10. Personal history of smoking  -      CT Chest Low Dose Wo; Future    11. Encounter for screening for malignant neoplasm of lung in current smoker with 30 pack year history or greater  -     CT Chest Low Dose Wo; Future       Is a status of diabetes.  Reinitiate Metformin if indicated. Patient encouraged to follow diabetic appropriate diet, exercise daily, perform feet check daily, and have yearly diabetic eye exams.    CAD, hyperlipidemia managed per cardiology at [facility].  Doing well on statin, aspirin, ACE inhibitor.     COPD/emphysema stable. Tobacco cessation advised.  Pt voiced understanding of health risks of cont'd tobacco use.  He is amenable to lung cancer screening.    Assess status of vit/min deficiencies and replace as indicated.    Pt advised to eat a heart healthy diet and get regular aerobic exercise.    Routine follow-up in 6 months, sooner as needed/instructed.  I will contact patient regarding test results and provide instructions regarding any necessary changes in plan of care.  Patient was encouraged to keep me informed of any acute changes, lack of improvement, or any new concerning symptoms.  Pt is aware of reasons to seek emergent care.  Patient voiced understanding of all instructions and denied further questions.    Please note that portions of this note may have been completed with a voice recognition program. Efforts were made to edit the dictations, but occasionally words are mistranscribed.

## 2021-07-07 LAB
25(OH)D3+25(OH)D2 SERPL-MCNC: 37.2 NG/ML (ref 30–100)
ALBUMIN SERPL-MCNC: 4.2 G/DL (ref 3.5–5.2)
ALBUMIN/GLOB SERPL: 2.2 G/DL
ALP SERPL-CCNC: 61 U/L (ref 39–117)
ALT SERPL-CCNC: 10 U/L (ref 1–41)
AST SERPL-CCNC: 13 U/L (ref 1–40)
BASOPHILS # BLD AUTO: 0.06 10*3/MM3 (ref 0–0.2)
BASOPHILS NFR BLD AUTO: 1 % (ref 0–1.5)
BILIRUB SERPL-MCNC: 0.5 MG/DL (ref 0–1.2)
BUN SERPL-MCNC: 10 MG/DL (ref 8–23)
BUN/CREAT SERPL: 11 (ref 7–25)
CALCIUM SERPL-MCNC: 9.3 MG/DL (ref 8.6–10.5)
CHLORIDE SERPL-SCNC: 103 MMOL/L (ref 98–107)
CHOLEST SERPL-MCNC: 198 MG/DL (ref 0–200)
CO2 SERPL-SCNC: 26.1 MMOL/L (ref 22–29)
CREAT SERPL-MCNC: 0.91 MG/DL (ref 0.76–1.27)
EOSINOPHIL # BLD AUTO: 0.11 10*3/MM3 (ref 0–0.4)
EOSINOPHIL NFR BLD AUTO: 1.7 % (ref 0.3–6.2)
ERYTHROCYTE [DISTWIDTH] IN BLOOD BY AUTOMATED COUNT: 13.9 % (ref 12.3–15.4)
GLOBULIN SER CALC-MCNC: 1.9 GM/DL
GLUCOSE SERPL-MCNC: 134 MG/DL (ref 65–99)
HBA1C MFR BLD: 5.6 % (ref 4.8–5.6)
HCT VFR BLD AUTO: 46.7 % (ref 37.5–51)
HDLC SERPL-MCNC: 51 MG/DL (ref 40–60)
HGB BLD-MCNC: 15.6 G/DL (ref 13–17.7)
IMM GRANULOCYTES # BLD AUTO: 0.01 10*3/MM3 (ref 0–0.05)
IMM GRANULOCYTES NFR BLD AUTO: 0.2 % (ref 0–0.5)
LDLC SERPL CALC-MCNC: 115 MG/DL (ref 0–100)
LYMPHOCYTES # BLD AUTO: 2.24 10*3/MM3 (ref 0.7–3.1)
LYMPHOCYTES NFR BLD AUTO: 35.6 % (ref 19.6–45.3)
MCH RBC QN AUTO: 29.8 PG (ref 26.6–33)
MCHC RBC AUTO-ENTMCNC: 33.4 G/DL (ref 31.5–35.7)
MCV RBC AUTO: 89.1 FL (ref 79–97)
MONOCYTES # BLD AUTO: 0.72 10*3/MM3 (ref 0.1–0.9)
MONOCYTES NFR BLD AUTO: 11.4 % (ref 5–12)
NEUTROPHILS # BLD AUTO: 3.16 10*3/MM3 (ref 1.7–7)
NEUTROPHILS NFR BLD AUTO: 50.1 % (ref 42.7–76)
NRBC BLD AUTO-RTO: 0 /100 WBC (ref 0–0.2)
PLATELET # BLD AUTO: 240 10*3/MM3 (ref 140–450)
POTASSIUM SERPL-SCNC: 4.6 MMOL/L (ref 3.5–5.2)
PROT SERPL-MCNC: 6.1 G/DL (ref 6–8.5)
RBC # BLD AUTO: 5.24 10*6/MM3 (ref 4.14–5.8)
SODIUM SERPL-SCNC: 139 MMOL/L (ref 136–145)
TRIGL SERPL-MCNC: 182 MG/DL (ref 0–150)
VIT B12 SERPL-MCNC: 315 PG/ML (ref 211–946)
VLDLC SERPL CALC-MCNC: 32 MG/DL (ref 5–40)
WBC # BLD AUTO: 6.3 10*3/MM3 (ref 3.4–10.8)

## 2021-07-21 ENCOUNTER — HOSPITAL ENCOUNTER (OUTPATIENT)
Dept: CT IMAGING | Facility: HOSPITAL | Age: 66
Discharge: HOME | End: 2021-07-21
Admitting: FAMILY MEDICINE

## 2021-07-21 DIAGNOSIS — Z12.2 ENCOUNTER FOR SCREENING FOR MALIGNANT NEOPLASM OF LUNG IN CURRENT SMOKER WITH 30 PACK YEAR HISTORY OR GREATER: ICD-10-CM

## 2021-07-21 DIAGNOSIS — F17.200 ENCOUNTER FOR SCREENING FOR MALIGNANT NEOPLASM OF LUNG IN CURRENT SMOKER WITH 30 PACK YEAR HISTORY OR GREATER: ICD-10-CM

## 2021-07-21 DIAGNOSIS — Z87.891 PERSONAL HISTORY OF SMOKING: ICD-10-CM

## 2021-07-21 PROCEDURE — 71271 CT THORAX LUNG CANCER SCR C-: CPT

## 2021-07-21 NOTE — PROGRESS NOTES
Please inform pt CT chest for lung cancer screening was stable from previous. No new concerns. He will need repeat in 1 year.

## 2021-08-17 ENCOUNTER — TELEPHONE (OUTPATIENT)
Dept: FAMILY MEDICINE CLINIC | Facility: CLINIC | Age: 66
End: 2021-08-17

## 2021-08-17 NOTE — TELEPHONE ENCOUNTER
Provider: WILTON     Caller: ALEAH EDWARD    Relationship to Patient: SPOUSE    Phone Number: [phone]    Reason for Call: PATIENT'S SPOUSE  ASKED IF DR. NÚÑEZ WOULD LIKE THE PATIENT TO TAKE THE COVID BOOSTER ON ACCOUNT OF HIS HEALTH.

## 2021-08-18 NOTE — TELEPHONE ENCOUNTER
Pt had Moderna which appears to be holding its effect longer than others. He was also vaccinated less than 6 months ago, so I don't feel booster needed at this time.

## 2022-01-05 RX ORDER — OMEPRAZOLE 40 MG/1
CAPSULE, DELAYED RELEASE ORAL
Qty: 90 CAPSULE | Refills: 1 | Status: SHIPPED | OUTPATIENT
Start: 2022-01-05 | End: 2022-01-05 | Stop reason: SDUPTHER

## 2022-01-05 RX ORDER — OMEPRAZOLE 40 MG/1
40 CAPSULE, DELAYED RELEASE ORAL DAILY
Qty: 90 CAPSULE | Refills: 1 | Status: SHIPPED | OUTPATIENT
Start: 2022-01-05 | End: 2022-01-07

## 2022-01-05 NOTE — TELEPHONE ENCOUNTER
Caller: Anabelle Bridges    Relationship: Emergency Contact    Best call back number: [phone]    Requested Prescriptions:   Requested Prescriptions     Pending Prescriptions Disp Refills   • omeprazole (priLOSEC) 40 MG capsule 90 capsule 1     Sig: Take 1 capsule by mouth Daily.        Pharmacy where request should be sent: [id number] - [place], [place] - [address] - [phone]  - [phone] FX     Additional details provided by patient: PATIENT'S WIFE STATED SHE JUST GOT OFF THE PHONE WITH THE PHARMACY BUT WANTED TO MAKE SURE THEY SUBMITTED THE REQUEST SINCE HER  WAS TOTALLY OUT OF THE MEDICATION AS OF TODAY. WIFE STATED THEY THOUGHT THEY HAD A REFILL LEFT AT THE PHARMACY BUT WAS ADVISED WHEN THEY CALLED TODAY HE WAS OUT OF REFILLS.     Does the patient have less than a 3 day supply:  [x] Yes  [] No    Duke Butler Rep   01/05/22 17:46 EST

## 2022-01-07 RX ORDER — OMEPRAZOLE 40 MG/1
CAPSULE, DELAYED RELEASE ORAL
Qty: 90 CAPSULE | Refills: 1 | Status: SHIPPED | OUTPATIENT
Start: 2022-01-07 | End: 2023-04-03

## 2022-01-12 ENCOUNTER — OFFICE VISIT (OUTPATIENT)
Dept: FAMILY MEDICINE CLINIC | Facility: CLINIC | Age: 67
End: 2022-01-12

## 2022-01-12 VITALS
SYSTOLIC BLOOD PRESSURE: 120 MMHG | HEIGHT: 71 IN | HEART RATE: 79 BPM | TEMPERATURE: 97.7 F | WEIGHT: 162 LBS | OXYGEN SATURATION: 96 % | BODY MASS INDEX: 22.68 KG/M2 | DIASTOLIC BLOOD PRESSURE: 70 MMHG

## 2022-01-12 DIAGNOSIS — E11.9 WELL CONTROLLED DIABETES MELLITUS: ICD-10-CM

## 2022-01-12 DIAGNOSIS — M48.02 SPINAL STENOSIS OF CERVICAL REGION: ICD-10-CM

## 2022-01-12 DIAGNOSIS — Z79.891 CHRONIC PRESCRIPTION OPIATE USE: ICD-10-CM

## 2022-01-12 DIAGNOSIS — Z51.81 MEDICATION MONITORING ENCOUNTER: ICD-10-CM

## 2022-01-12 DIAGNOSIS — Z12.11 SCREEN FOR COLON CANCER: ICD-10-CM

## 2022-01-12 DIAGNOSIS — R51.9 CHRONIC DAILY HEADACHE: ICD-10-CM

## 2022-01-12 DIAGNOSIS — R53.82 CHRONIC FATIGUE: ICD-10-CM

## 2022-01-12 DIAGNOSIS — E53.8 COBALAMIN DEFICIENCY: ICD-10-CM

## 2022-01-12 DIAGNOSIS — E78.2 MIXED HYPERLIPIDEMIA: ICD-10-CM

## 2022-01-12 DIAGNOSIS — Z00.00 MEDICARE ANNUAL WELLNESS VISIT, SUBSEQUENT: Primary | ICD-10-CM

## 2022-01-12 DIAGNOSIS — G89.4 CHRONIC PAIN SYNDROME: ICD-10-CM

## 2022-01-12 DIAGNOSIS — Z12.5 SCREENING FOR PROSTATE CANCER: ICD-10-CM

## 2022-01-12 DIAGNOSIS — F17.200 TOBACCO DEPENDENCE SYNDROME: ICD-10-CM

## 2022-01-12 DIAGNOSIS — E08.42 DIABETIC POLYNEUROPATHY ASSOCIATED WITH DIABETES MELLITUS DUE TO UNDERLYING CONDITION: ICD-10-CM

## 2022-01-12 DIAGNOSIS — M48.062 SPINAL STENOSIS OF LUMBAR REGION WITH NEUROGENIC CLAUDICATION: ICD-10-CM

## 2022-01-12 DIAGNOSIS — I25.10 CHRONIC CORONARY ARTERY DISEASE: ICD-10-CM

## 2022-01-12 DIAGNOSIS — Z23 NEED FOR INFLUENZA VACCINATION: ICD-10-CM

## 2022-01-12 DIAGNOSIS — E55.9 VITAMIN D DEFICIENCY: ICD-10-CM

## 2022-01-12 DIAGNOSIS — J43.8 OTHER EMPHYSEMA: ICD-10-CM

## 2022-01-12 PROCEDURE — G0439 PPPS, SUBSEQ VISIT: HCPCS | Performed by: FAMILY MEDICINE

## 2022-01-12 PROCEDURE — 1159F MED LIST DOCD IN RCRD: CPT | Performed by: FAMILY MEDICINE

## 2022-01-12 PROCEDURE — 90662 IIV NO PRSV INCREASED AG IM: CPT | Performed by: FAMILY MEDICINE

## 2022-01-12 PROCEDURE — G0008 ADMIN INFLUENZA VIRUS VAC: HCPCS | Performed by: FAMILY MEDICINE

## 2022-01-12 RX ORDER — IPRATROPIUM/ALBUTEROL SULFATE 20-100 MCG
1 MIST INHALER (GRAM) INHALATION 4 TIMES DAILY
Qty: 4 G | Refills: 11 | Status: SHIPPED | OUTPATIENT
Start: 2022-01-12 | End: 2023-03-30

## 2022-01-12 NOTE — PROGRESS NOTES
The ABCs of the Annual Wellness Visit  Subsequent Medicare Wellness Visit    Chief Complaint   Patient presents with   • Medicare Wellness-subsequent      Subjective    History of Present Illness:  Michael Bridges is a 66 y.o. male who presents for a Subsequent Medicare Wellness Visit.    He suffers from chronic pain syndrome due to severe cervical and lumbar degenerative disc disease, severe osteoarthritis of the cervical spine.  Has spinal stenosis in the cervical and lumbar regions.  Status post both cervical and lumbar spine surgeries. Currently being managed by pain specialist.  On gabapentin, Percocet, muscle relaxant.  No new focal neurological symptoms.     He has chronic coronary artery disease associated with hyperlipidemia. Currently on Zocor, ACE inhibitor and aspirin.  Followed by cardiology at [facility] although he has not seen them in some time. No recent changes in status. Denies chest pain, shortness of breath, palpitations, orthopnea.  He has stable intermittent swelling in the lower legs.     He has COPD/emphysema with chronic tobacco use.  smoking approx 1 pack of cigarettes/day.  Currently on Combivent with nebulizer as needed.  No recent increase cough, wheeze or dyspnea on exertion. Denies chest pain or hemoptysis.  No fever or weight loss.      He has non-insulin-dependent diabetes mellitus which has been well controlled. Currently not requiring medication. Generally follows a diabetic appropriate diet.  No regular exercise due to orthopedic limitations.  He is not up-to-date on diabetic eye exam but plans to scheduled.     He has chronic B12 deficiency.  taking oral replacement    Previously ordered Cologuard for him as his choice for colon cancer screening.  He unfortunately never received the kit.  Will order again on his behalf.    Pt's previous HPI reviewed and updated as indicated.     The following portions of the patient's history were reviewed and    updated as appropriate: allergies, current medications, past family history, past medical history, past social history, past surgical history and problem list.    Compared to one year ago, the patient feels his physical   health is worse.    Compared to one year ago, the patient feels his mental   health is the same.    Recent Hospitalizations:  He was not admitted to the hospital during the last year.       Current Medical Providers:  Patient Care Team:  Diana Priest MD as PCP - General (Family Medicine)  Breezy Noel II, MD as Consulting Physician (Pain Medicine)  Cheryl Marin MD as Consulting Physician (Cardiology)    Outpatient Medications Prior to Visit   Medication Sig Dispense Refill   • aspirin 81 MG EC tablet Take 1 tablet by mouth Daily. 30 tablet 5   • cyclobenzaprine (FLEXERIL) 10 MG tablet Take 1 tablet by mouth 3 (Three) Times a Day As Needed for muscle spasms. 90 tablet 2   • diphenhydrAMINE (BENADRYL) 25 mg capsule Take 25 mg by mouth Every 6 (Six) Hours As Needed for Itching (RHINITIS).     • divalproex (DEPAKOTE) 250 MG DR tablet 1 po bid (Patient taking differently: Take 250 mg by mouth 2 (Two) Times a Day. 1 po bid) 60 tablet 2   • enalapril (VASOTEC) 10 MG tablet   5   • flunisolide (NASALIDE) 25 MCG/ACT (0.025%) solution nasal spray Inhale 2 sprays Daily. 1 bottle 5   • gabapentin (NEURONTIN) 600 MG tablet Take 1 tablet by mouth 3 (Three) Times a Day. 90 tablet 0   • mirtazapine (REMERON) 30 MG tablet Take 1 tablet by mouth Every Night. 30 tablet 2   • naloxone (NARCAN) 4 MG/0.1ML nasal spray 1 spray into each nostril As Needed (suspected overdose). May repeat with 2nd device in opposite nostril if minimal response in 2-3 minutes 2 each 2   • omeprazole (priLOSEC) 40 MG capsule TAKE 1 CAPSULE BY MOUTH EVERY DAY 90 capsule 1   • oxyCODONE-acetaminophen (PERCOCET) 7.5-325 MG per tablet TK 1 T PO Q 8 H  0   • Polyethylene Glycol 3350 granules MIX 1 CAPFUL (17GM) IN 8 OUNCES OF  WATER, JUICE, OR TEA AND DRINK TWICE DAILY AS NEEDED FOR CONSTIPATION (Patient taking differently: Take 1 package by mouth Daily. MIX 1 CAPFUL (17GM) IN 8 OUNCES OF WATER, JUICE, OR TEA AND DRINK TWICE DAILY AS NEEDED FOR CONSTIPATION) 1 bottle 5   • simvastatin (ZOCOR) 20 MG tablet Take 1 tablet by mouth Every Night. 30 tablet 11   • amitriptyline (ELAVIL) 25 MG tablet Take 1 tablet by mouth Every Night. Take two pills at supper time 60 tablet 3   • ipratropium-albuterol (Combivent Respimat)  MCG/ACT inhaler Inhale 1 puff 4 (Four) Times a Day. 4 g 2     Facility-Administered Medications Prior to Visit   Medication Dose Route Frequency Provider Last Rate Last Admin   • cyanocobalamin injection 1,000 mcg  1,000 mcg Intramuscular Weekly Diana Priest MD   1,000 mcg at 01/06/21 1051       Opioid medication/s are on active medication list.  and I have evaluated his active treatment plan and pain score trends (see table).  Vitals:    01/12/22 1000   PainSc:   6     I have reviewed the chart for potential of high risk medication and harmful drug interactions in the elderly.            Aspirin is on active medication list. Aspirin use is indicated based on review of current medical condition/s. Pros and cons of this therapy have been discussed today. Benefits of this medication outweigh potential harm.  Patient has been encouraged to continue taking this medication.  .      Patient Active Problem List   Diagnosis   • Atopic rhinitis   • Osteoarthritis of cervical spine   • Chronic pain syndrome   • Well controlled diabetes mellitus (HCC)   • Chronic coronary artery disease   • Degeneration of intervertebral disc of cervical region   • Gastroesophageal reflux disease   • Abnormal gait   • Hyperlipidemia   • Insomnia   • Degeneration of intervertebral disc of lumbar region   • Lumbar radiculopathy   • Spinal stenosis of lumbar region   • Neuropathic pain syndrome (non-herpetic)   • Peripheral neuropathy   •  Pulmonary emphysema (HCC)   • Restless legs syndrome   • Spinal stenosis of cervical region   • Tobacco dependence syndrome   • Tremor   • Cobalamin deficiency   • Vitamin D deficiency   • Constipation   • Muscle spasm   • Nodule of right lung   • Chronic daily headache   • BPH (benign prostatic hypertrophy)   • Congenital duplication of renal collecting system   • Chronic back pain   • Colonoscopy refused   • Chronic prescription opiate use   • Diabetic polyneuropathy associated with diabetes mellitus due to underlying condition (HCC)     Advance Care Planning  Advance Directive is not on file.  ACP discussion was held with the patient during this visit. Patient does not have an advance directive, information provided.    Review of Systems   Constitutional: Positive for fatigue. Negative for fever.   HENT: Positive for congestion and postnasal drip. Negative for mouth sores, nosebleeds and trouble swallowing.    Eyes: Positive for visual disturbance (chronic stable).   Respiratory: Positive for cough. Negative for shortness of breath and wheezing.    Cardiovascular: Positive for leg swelling. Negative for chest pain and palpitations.   Gastrointestinal: Negative for abdominal pain, blood in stool, diarrhea, nausea and vomiting.   Endocrine: Positive for cold intolerance.   Genitourinary: Negative for difficulty urinating, dysuria, hematuria, scrotal swelling and testicular pain.   Musculoskeletal: Positive for arthralgias, back pain, gait problem, joint swelling, myalgias, neck pain and neck stiffness.   Skin: Negative for rash and wound.   Neurological: Positive for dizziness, tremors, weakness, numbness and confusion (occ mild). Negative for syncope.   Hematological: Negative for adenopathy. Does not bruise/bleed easily.   Psychiatric/Behavioral: Positive for sleep disturbance. Negative for dysphoric mood. The patient is nervous/anxious.         Objective    Vitals:    01/12/22 1000   BP: 120/70   Pulse: 79  "  Temp: 97.7 °F (36.5 °C)   SpO2: 96%   Weight: 73.5 kg (162 lb)   Height: 180.3 cm (71\")   PainSc:   6     BMI Readings from Last 1 Encounters:   01/12/22 22.59 kg/m²   BMI is within normal parameters. No follow-up required.    Does the patient have evidence of cognitive impairment? No    Physical Exam  Vitals and nursing note reviewed.   Constitutional:       General: He is not in acute distress.     Appearance: He is well-developed, well-groomed and normal weight. He is not ill-appearing.      Comments: Appears older than stated age.   HENT:      Head: Normocephalic and atraumatic.      Mouth/Throat:      Mouth: Mucous membranes are moist.   Eyes:      General: No scleral icterus.     Extraocular Movements: Extraocular movements intact.      Conjunctiva/sclera: Conjunctivae normal.   Neck:      Thyroid: No thyroid mass.      Vascular: Normal carotid pulses. No carotid bruit.   Cardiovascular:      Rate and Rhythm: Normal rate and regular rhythm.      Pulses: Normal pulses.      Heart sounds: Heart sounds are distant.   Pulmonary:      Effort: Pulmonary effort is normal.      Breath sounds: Decreased breath sounds present. No wheezing, rhonchi or rales.   Musculoskeletal:      Cervical back: Decreased range of motion.      Lumbar back: Decreased range of motion.      Right lower leg: No edema.      Left lower leg: No edema.   Lymphadenopathy:      Cervical: No cervical adenopathy.   Skin:     General: Skin is warm and dry.      Coloration: Skin is not jaundiced or pale.   Neurological:      Mental Status: He is alert and oriented to person, place, and time. Mental status is at baseline.      Motor: Weakness (lower extremities, appears at baseline) present.      Gait: Gait abnormal (antalgic, requires assistance, using single prong cane).   Psychiatric:         Mood and Affect: Mood and affect normal.         Behavior: Behavior normal. Behavior is cooperative.         Cognition and Memory: Cognition normal. "     Pt's previous physical exam reviewed and updated as indicated.      Lab Results   Component Value Date    CHLPL 183 01/12/2022    TRIG 182 (H) 01/12/2022    HDL 58 01/12/2022    LDL 94 01/12/2022    VLDL 31 01/12/2022    HGBA1C 5.70 (H) 01/12/2022            HEALTH RISK ASSESSMENT    Smoking Status:  Social History     Tobacco Use   Smoking Status Current Every Day Smoker   • Packs/day: 1.00   • Years: 57.00   • Pack years: 57.00   • Types: Cigarettes   Smokeless Tobacco Former User   • Types: Chew, Snuff   Tobacco Comment    has smoked up to 2-3 ppd intermittently     Alcohol Consumption:  Social History     Substance and Sexual Activity   Alcohol Use No     Fall Risk Screen:    GLORIAADI Fall Risk Assessment was completed, and patient is at MODERATE risk for falls. Assessment completed on:1/12/2022    Depression Screening:  PHQ-2/PHQ-9 Depression Screening 7/6/2021   Little interest or pleasure in doing things 0   Feeling down, depressed, or hopeless 0   Trouble falling or staying asleep, or sleeping too much -   Feeling tired or having little energy -   Poor appetite or overeating -   Feeling bad about yourself - or that you are a failure or have let yourself or your family down -   Trouble concentrating on things, such as reading the newspaper or watching television -   Moving or speaking so slowly that other people could have noticed. Or the opposite - being so fidgety or restless that you have been moving around a lot more than usual -   Thoughts that you would be better off dead, or of hurting yourself in some way -   Total Score 0   If you checked off any problems, how difficult have these problems made it for you to do your work, take care of things at home, or get along with other people? -       Health Habits and Functional and Cognitive Screening:  Functional & Cognitive Status 1/12/2022   Do you have difficulty preparing food and eating? No   Do you have difficulty bathing yourself, getting dressed or  grooming yourself? No   Do you have difficulty using the toilet? No   Do you have difficulty moving around from place to place? Yes   Do you have trouble with steps or getting out of a bed or a chair? Yes   Current Diet Well Balanced Diet   Dental Exam Not up to date   Eye Exam Not up to date   Exercise (times per week) 2 times per week   Current Exercises Include (No Data)        Exercise Comment PT exercises from Dr Dill   Current Exercise Activities Include -   Do you need help using the phone?  No   Are you deaf or do you have serious difficulty hearing?  No   Do you need help with transportation? No   Do you need help shopping? Yes   Do you need help preparing meals?  No   Do you need help with housework?  Yes   Do you need help with laundry? Yes   Do you need help taking your medications? No   Do you need help managing money? No   Do you ever drive or ride in a car without wearing a seat belt? No   Have you felt unusual stress, anger or loneliness in the last month? No   Who do you live with? Spouse   If you need help, do you have trouble finding someone available to you? No   Have you been bothered in the last four weeks by sexual problems? No   Do you have difficulty concentrating, remembering or making decisions? No       Age-appropriate Screening Schedule:  Refer to the list below for future screening recommendations based on patient's age, sex and/or medical conditions. Orders for these recommended tests are listed in the plan section. The patient has been provided with a written plan.    Health Maintenance   Topic Date Due   • DXA SCAN  Never done   • ZOSTER VACCINE (1 of 2) Never done   • DIABETIC FOOT EXAM  03/12/2020   • DIABETIC EYE EXAM  07/01/2021   • HEMOGLOBIN A1C  07/12/2022   • LIPID PANEL  01/12/2023   • URINE MICROALBUMIN  01/12/2023   • TDAP/TD VACCINES (3 - Td or Tdap) 01/17/2028   • INFLUENZA VACCINE  Completed              Assessment/Plan   CMS Preventative Services Quick Reference  Risk  Factors Identified During Encounter  Chronic Pain   Depression/Dysphoria  Fall Risk-High or Moderate  Immunizations Discussed/Encouraged (specific Immunizations; Tdap, Influenza, Pneumococcal 23, Shingrix and COVID19  Inactivity/Sedentary  Tobacco Use/Dependance (use dotphrase .tobaccocessation for documentation)  The above risks/problems have been discussed with the patient.  Follow up actions/plans if indicated are seen below in the Assessment/Plan Section.  Pertinent information has been shared with the patient in the After Visit Summary.    Diagnoses and all orders for this visit:    1. Medicare annual wellness visit, subsequent (Primary)    2. Other emphysema (HCC)  -     ipratropium-albuterol (Combivent Respimat)  MCG/ACT inhaler; Inhale 1 puff 4 (Four) Times a Day.  Dispense: 4 g; Refill: 11  -     CBC & Differential    3. Need for influenza vaccination  -     Fluzone High-Dose 65+yrs ([id number])    4. Well controlled diabetes mellitus (HCC)  -     Comprehensive Metabolic Panel  -     Hemoglobin A1c  -     Microalbumin / Creatinine Urine Ratio - Urine, Clean Catch    5. Tobacco dependence syndrome    6. Mixed hyperlipidemia  -     Comprehensive Metabolic Panel  -     Lipid Panel    7. Chronic coronary artery disease  -     CBC & Differential  -     Comprehensive Metabolic Panel    8. Vitamin D deficiency  -     vitamin D3 125 MCG (5000 UT) capsule capsule; Take 1 capsule by mouth Daily.  Dispense: 30 capsule; Refill: 11    9. Medication monitoring encounter  -     CBC & Differential  -     Comprehensive Metabolic Panel    10. Chronic fatigue  -     TSH Rfx On Abnormal To Free T4    11. Screening for prostate cancer  -     PSA Screen    12. Diabetic polyneuropathy associated with diabetes mellitus due to underlying condition (HCC)    13. Chronic pain syndrome    14. Chronic prescription opiate use    15. Spinal stenosis of lumbar region with neurogenic claudication    16. Spinal stenosis of cervical  region    17. Screen for colon cancer  -     Cologuard - Stool, Per Rectum; Future    18. Cobalamin deficiency    19. Chronic daily headache      Chronic medical conditions appear generally stable.    Tobacco cessation advised.  Pt voiced understanding of health risks of cont'd tobacco use.    Pt advised to eat a heart healthy diet and get regular aerobic exercise.    Follow Up:   Return in about 6 months (around 7/12/2022).   F/u sooner as needed/instructed.  Follow-up with pain management as scheduled.  Recommend he see cardiology at least once yearly.  I will contact patient regarding test results and provide instructions regarding any necessary changes in plan of care.  Patient was encouraged to keep me informed of any acute changes or any new concerning symptoms.  Pt is aware of reasons to seek emergent care.  Patient voiced understanding of all instructions and denied further questions.        An After Visit Summary and PPPS were made available to the patient.               Please note that portions of this note may have been completed with a voice recognition program. Efforts were made to edit the dictations, but occasionally words are mistranscribed.

## 2022-01-12 NOTE — PATIENT INSTRUCTIONS
Advance Care Planning and Advance Directives     You make decisions on a daily basis - decisions about where you want to live, your career, your home, your life. Perhaps one of the most important decisions you face is your choice for future medical care. Take time to talk with your family and your healthcare team and start planning today.  Advance Care Planning is a process that can help you:  · Understand possible future healthcare decisions in light of your own experiences  · Reflect on those decision in light of your goals and values  · Discuss your decisions with those closest to you and the healthcare professionals that care for you  · Make a plan by creating a document that reflects your wishes    Surrogate Decision Maker  In the event of a medical emergency, which has left you unable to communicate or to make your own decisions, you would need someone to make decisions for you.  It is important to discuss your preferences for medical treatment with this person while you are in good health.     Qualities of a surrogate decision maker:  • Willing to take on this role and responsibility  • Knows what you want for future medical care  • Willing to follow your wishes even if they don't agree with them  • Able to make difficult medical decisions under stressful circumstances    Advance Directives  These are legal documents you can create that will guide your healthcare team and decision maker(s) when needed. These documents can be stored in the electronic medical record.    · Living Will - a legal document to guide your care if you have a terminal condition or a serious illness and are unable to communicate. States vary by statute in document names/types, but most forms may include one or more of the following:        -  Directions regarding life-prolonging treatments        -  Directions regarding artificially provided nutrition/hydration        -  Choosing a healthcare decision maker        -  Direction  regarding organ/tissue donation    · Durable Power of  for Healthcare - this document names an -in-fact to make medical decisions for you, but it may also allow this person to make personal and financial decisions for you. Please seek the advice of an  if you need this type of document.    **Advance Directives are not required and no one may discriminate against you if you do not sign one.    Medical Orders  Many states allow specific forms/orders signed by your physician to record your wishes for medical treatment in your current state of health. This form, signed in personal communication with your physician, addresses resuscitation and other medical interventions that you may or may not want.      For more information or to schedule a time with a [facility] Advance Care Planning Facilitator contact: [facility] or call [phone] and someone will contact you directly.    Preventive Care 65 Years and Older, Male  Preventive care refers to lifestyle choices and visits with your health care provider that can promote health and wellness. This includes:  · A yearly physical exam. This is also called an annual well check.  · Regular dental and eye exams.  · Immunizations.  · Screening for certain conditions.  · Healthy lifestyle choices, such as diet and exercise.  What can I expect for my preventive care visit?  Physical exam  Your health care provider will check:  · Height and weight. These may be used to calculate body mass index (BMI), which is a measurement that tells if you are at a healthy weight.  · Heart rate and blood pressure.  · Your skin for abnormal spots.  Counseling  Your health care provider may ask you questions about:  · Alcohol, tobacco, and drug use.  · Emotional well-being.  · Home and relationship well-being.  · Sexual activity.  · Eating habits.  · History of falls.  · Memory and ability to understand (cognition).  · Work and work environment.  What  immunizations do I need?    Influenza (flu) vaccine  · This is recommended every year.  Tetanus, diphtheria, and pertussis (Tdap) vaccine  · You may need a Td booster every 10 years.  Varicella (chickenpox) vaccine  · You may need this vaccine if you have not already been vaccinated.  Zoster (shingles) vaccine  · You may need this after age 60.  Pneumococcal conjugate (PCV13) vaccine  · One dose is recommended after age 65.  Pneumococcal polysaccharide (PPSV23) vaccine  · One dose is recommended after age 65.  Measles, mumps, and rubella (MMR) vaccine  · You may need at least one dose of MMR if you were born in 1957 or later. You may also need a second dose.  Meningococcal conjugate (MenACWY) vaccine  · You may need this if you have certain conditions.  Hepatitis A vaccine  · You may need this if you have certain conditions or if you travel or work in places where you may be exposed to hepatitis A.  Hepatitis B vaccine  · You may need this if you have certain conditions or if you travel or work in places where you may be exposed to hepatitis B.  Haemophilus influenzae type b (Hib) vaccine  · You may need this if you have certain conditions.  You may receive vaccines as individual doses or as more than one vaccine together in one shot (combination vaccines). Talk with your health care provider about the risks and benefits of combination vaccines.  What tests do I need?  Blood tests  · Lipid and cholesterol levels. These may be checked every 5 years, or more frequently depending on your overall health.  · Hepatitis C test.  · Hepatitis B test.  Screening  · Lung cancer screening. You may have this screening every year starting at age 55 if you have a 30-pack-year history of smoking and currently smoke or have quit within the past 15 years.  · Colorectal cancer screening. All adults should have this screening starting at age 50 and continuing until age 75. Your health care provider may recommend screening at age 45 if  you are at increased risk. You will have tests every 1-10 years, depending on your results and the type of screening test.  · Prostate cancer screening. Recommendations will vary depending on your family history and other risks.  · Diabetes screening. This is done by checking your blood sugar (glucose) after you have not eaten for a while (fasting). You may have this done every 1-3 years.  · Abdominal aortic aneurysm (AAA) screening. You may need this if you are a current or former smoker.  · Sexually transmitted disease (STD) testing.  Follow these instructions at home:  Eating and drinking  · Eat a diet that includes fresh fruits and vegetables, whole grains, lean protein, and low-fat dairy products. Limit your intake of foods with high amounts of sugar, saturated fats, and salt.  · Take vitamin and mineral supplements as recommended by your health care provider.  · Do not drink alcohol if your health care provider tells you not to drink.  · If you drink alcohol:  ? Limit how much you have to 0-2 drinks a day.  ? Be aware of how much alcohol is in your drink. In the [place] one drink equals one 12 oz bottle of beer (355 mL), one 5 oz glass of wine (148 mL), or one 1½ oz glass of hard liquor (44 mL).  Lifestyle  · Take daily care of your teeth and gums.  · Stay active. Exercise for at least 30 minutes on 5 or more days each week.  · Do not use any products that contain nicotine or tobacco, such as cigarettes, e-cigarettes, and chewing tobacco. If you need help quitting, ask your health care provider.  · If you are sexually active, practice safe sex. Use a condom or other form of protection to prevent STIs (sexually transmitted infections).  · Talk with your health care provider about taking a low-dose aspirin or statin.  What's next?  · Visit your health care provider once a year for a well check visit.  · Ask your health care provider how often you should have your eyes and teeth checked.  · Stay up to date on all  vaccines.  This information is not intended to replace advice given to you by your health care provider. Make sure you discuss any questions you have with your health care provider.  Document Revised: 2019 Document Reviewed: 2019  [employer] Patient Education ©  [employer]      Medicare Wellness  Personal Prevention Plan of Service     Date of Office Visit:  2022  Encounter Provider:  Diana Priest MD  Place of Service:  [facility]  Patient Name: Michael Bridges  :  1955    As part of the Medicare Wellness portion of your visit today, we are providing you with this personalized preventive plan of services (PPPS). This plan is based upon recommendations of the [place] Preventive Services Task Force (USPSTF) and the Advisory Committee on Immunization Practices (ACIP).    This lists the preventive care services that should be considered, and provides dates of when you are due. Items listed as completed are up-to-date and do not require any further intervention.    Health Maintenance   Topic Date Due   • DXA SCAN  Never done   • ZOSTER VACCINE (1 of 2) Never done   • COLORECTAL CANCER SCREENING  2010   • DIABETIC FOOT EXAM  2020   • DIABETIC EYE EXAM  2021   • URINE MICROALBUMIN  10/01/2021   • COVID-19 Vaccine (3 - Booster for Moderna series) 10/09/2021   • ANNUAL WELLNESS VISIT  2022   • HEMOGLOBIN A1C  2022   • LIPID PANEL  2022   • LUNG CANCER SCREENING  2022   • Pneumococcal Vaccine 65+ (2 of 2 - PPSV23) 2023   • TDAP/TD VACCINES (3 - Td or Tdap) 2028   • HEPATITIS C SCREENING  Completed   • INFLUENZA VACCINE  Completed   • AAA SCREEN (ONE-TIME)  Completed       Orders Placed This Encounter   Procedures   • Fluzone High-Dose 65+yrs (0300-4414)   • Comprehensive Metabolic Panel     Order Specific Question:   Release to patient     Answer:   Immediate   • Lipid Panel   • Hemoglobin A1c      Order Specific Question:   Release to patient     Answer:   Immediate   • Microalbumin / Creatinine Urine Ratio - Urine, Clean Catch     Order Specific Question:   Release to patient     Answer:   Immediate   • TSH Rfx On Abnormal To Free T4     Order Specific Question:   Release to patient     Answer:   Immediate   • PSA Screen     Order Specific Question:   Release to patient     Answer:   Immediate   • CBC & Differential     Order Specific Question:   Manual Differential     Answer:   No       Return in about 6 months (around 7/12/2022).

## 2022-01-13 PROBLEM — Z12.11 SCREENING FOR COLON CANCER: Status: RESOLVED | Noted: 2017-11-03 | Resolved: 2022-01-13

## 2022-01-13 LAB
ALBUMIN SERPL-MCNC: 4.8 G/DL (ref 3.5–5.2)
ALBUMIN/CREAT UR: <3 MG/G CREAT (ref 0–29)
ALBUMIN/GLOB SERPL: 3.2 G/DL
ALP SERPL-CCNC: 65 U/L (ref 39–117)
ALT SERPL-CCNC: 13 U/L (ref 1–41)
AST SERPL-CCNC: 20 U/L (ref 1–40)
BASOPHILS # BLD AUTO: 0.08 10*3/MM3 (ref 0–0.2)
BASOPHILS NFR BLD AUTO: 1.4 % (ref 0–1.5)
BILIRUB SERPL-MCNC: 0.4 MG/DL (ref 0–1.2)
BUN SERPL-MCNC: 11 MG/DL (ref 8–23)
BUN/CREAT SERPL: 12.2 (ref 7–25)
CALCIUM SERPL-MCNC: 9.3 MG/DL (ref 8.6–10.5)
CHLORIDE SERPL-SCNC: 104 MMOL/L (ref 98–107)
CHOLEST SERPL-MCNC: 183 MG/DL (ref 0–200)
CO2 SERPL-SCNC: 28.5 MMOL/L (ref 22–29)
CREAT SERPL-MCNC: 0.9 MG/DL (ref 0.76–1.27)
CREAT UR-MCNC: 100.4 MG/DL
EOSINOPHIL # BLD AUTO: 0.13 10*3/MM3 (ref 0–0.4)
EOSINOPHIL NFR BLD AUTO: 2.3 % (ref 0.3–6.2)
ERYTHROCYTE [DISTWIDTH] IN BLOOD BY AUTOMATED COUNT: 14.1 % (ref 12.3–15.4)
GLOBULIN SER CALC-MCNC: 1.5 GM/DL
GLUCOSE SERPL-MCNC: 99 MG/DL (ref 65–99)
HBA1C MFR BLD: 5.7 % (ref 4.8–5.6)
HCT VFR BLD AUTO: 47.8 % (ref 37.5–51)
HDLC SERPL-MCNC: 58 MG/DL (ref 40–60)
HGB BLD-MCNC: 16.5 G/DL (ref 13–17.7)
IMM GRANULOCYTES # BLD AUTO: 0 10*3/MM3 (ref 0–0.05)
IMM GRANULOCYTES NFR BLD AUTO: 0 % (ref 0–0.5)
LDLC SERPL CALC-MCNC: 94 MG/DL (ref 0–100)
LYMPHOCYTES # BLD AUTO: 2.32 10*3/MM3 (ref 0.7–3.1)
LYMPHOCYTES NFR BLD AUTO: 41.2 % (ref 19.6–45.3)
MCH RBC QN AUTO: 30.4 PG (ref 26.6–33)
MCHC RBC AUTO-ENTMCNC: 34.5 G/DL (ref 31.5–35.7)
MCV RBC AUTO: 88.2 FL (ref 79–97)
MICROALBUMIN UR-MCNC: <3 UG/ML
MONOCYTES # BLD AUTO: 0.65 10*3/MM3 (ref 0.1–0.9)
MONOCYTES NFR BLD AUTO: 11.5 % (ref 5–12)
NEUTROPHILS # BLD AUTO: 2.45 10*3/MM3 (ref 1.7–7)
NEUTROPHILS NFR BLD AUTO: 43.6 % (ref 42.7–76)
NRBC BLD AUTO-RTO: 0.2 /100 WBC (ref 0–0.2)
PLATELET # BLD AUTO: 297 10*3/MM3 (ref 140–450)
POTASSIUM SERPL-SCNC: 4.9 MMOL/L (ref 3.5–5.2)
PROT SERPL-MCNC: 6.3 G/DL (ref 6–8.5)
PSA SERPL-MCNC: 8.03 NG/ML (ref 0–4)
RBC # BLD AUTO: 5.42 10*6/MM3 (ref 4.14–5.8)
SODIUM SERPL-SCNC: 139 MMOL/L (ref 136–145)
TRIGL SERPL-MCNC: 182 MG/DL (ref 0–150)
TSH SERPL DL<=0.005 MIU/L-ACNC: 1.13 UIU/ML (ref 0.27–4.2)
VLDLC SERPL CALC-MCNC: 31 MG/DL (ref 5–40)
WBC # BLD AUTO: 5.63 10*3/MM3 (ref 3.4–10.8)

## 2022-01-16 ENCOUNTER — DOCUMENTATION (OUTPATIENT)
Dept: FAMILY MEDICINE CLINIC | Facility: CLINIC | Age: 67
End: 2022-01-16

## 2022-01-16 DIAGNOSIS — R97.20 ELEVATED PSA: Primary | ICD-10-CM

## 2022-02-08 ENCOUNTER — TELEPHONE (OUTPATIENT)
Dept: FAMILY MEDICINE CLINIC | Facility: CLINIC | Age: 67
End: 2022-02-08

## 2022-02-08 NOTE — TELEPHONE ENCOUNTER
Wife called said she had [employer] fax another form for her husbands diabetic shoes. Please complete form. [phone]

## 2022-02-09 NOTE — TELEPHONE ENCOUNTER
Caller: Aleah Bridges    Relationship: Emergency Contact    Best call back number: [phone]    What is the best time to reach you: ANY    Who are you requesting to speak with (clinical staff, provider,  specific staff member): CLINICAL STAFF     Do you know the name of the person who called: ALEAH; WIFE OF PATIENT    What was the call regarding: PATIENT WIFE CALLED TO FOLLOW UP ON THE MESSAGE THAT WAS SENT YESTERDAY REGARD DIABETIC SHOE    Do you require a callback: PLEASE CALL BACK AND CONFIRM WHEN THIS IS TAKEN CARE OF

## 2022-02-09 NOTE — TELEPHONE ENCOUNTER
Wife called today to see if the fax had been completed for the diabetic shoes. Please call and let her know when completed.

## 2022-02-10 ENCOUNTER — TELEPHONE (OUTPATIENT)
Dept: FAMILY MEDICINE CLINIC | Facility: CLINIC | Age: 67
End: 2022-02-10

## 2022-02-10 NOTE — TELEPHONE ENCOUNTER
Caller: Anabelle Bridges    Relationship: Emergency Contact    Best call back number: [phone]    What is the best time to reach you: ANYTIME    Who are you requesting to speak with (clinical staff, provider,  specific staff member): CLINICAL STAFF        What was the call regarding: THE PATIENTS WIFE WOULD LIKE TO KNOW IF THE PAPERWORK HAS BEEN COMPLETED AND SENT BACK TO [facility] FOR DIABETIC SHOES     Do you require a callback: YES

## 2022-02-10 NOTE — TELEPHONE ENCOUNTER
WIFE OF PATIENT HAS CALLED STATING PATIENT IS NEEDING DOCUMENTATION WITHIN THE LAST SIX MONTHS OF HIS OFFICE VISITS WITH FOOT EXAMINATION.  VISIT NOTES ARE NEEDED SO PATIENT CAN GET HIS DIABETIC SHOES.    OFFICE NOTES NEED TO BE SENT TO [facility] FOR DIABETIC SHOES.    CALL BACK NUMBER [phone]

## 2022-02-14 NOTE — TELEPHONE ENCOUNTER
FAXED LAST 6 MONTHS OF OFFICE NOTES TO [employer] @ [phone]. INFORMED WIFE. SHE THINKS THEY ARE STILL WAITING ON THE FORM TO BE FILLED OUT. I CALLED & SPOKE WITH MELVIN [phone], AND SHE SAID THEY DON'T HAVE IT BACK AND IS FAXING ANOTHER COPY OF THE FORM TO US.

## 2022-02-16 ENCOUNTER — OFFICE VISIT (OUTPATIENT)
Dept: FAMILY MEDICINE CLINIC | Facility: CLINIC | Age: 67
End: 2022-02-16

## 2022-02-16 ENCOUNTER — TELEPHONE (OUTPATIENT)
Dept: FAMILY MEDICINE CLINIC | Facility: CLINIC | Age: 67
End: 2022-02-16

## 2022-02-16 VITALS
HEART RATE: 68 BPM | BODY MASS INDEX: 23.52 KG/M2 | WEIGHT: 168 LBS | DIASTOLIC BLOOD PRESSURE: 76 MMHG | HEIGHT: 71 IN | TEMPERATURE: 97.8 F | SYSTOLIC BLOOD PRESSURE: 120 MMHG | OXYGEN SATURATION: 95 %

## 2022-02-16 DIAGNOSIS — E11.9 WELL CONTROLLED DIABETES MELLITUS: Primary | ICD-10-CM

## 2022-02-16 DIAGNOSIS — R97.20 ELEVATED PSA: ICD-10-CM

## 2022-02-16 DIAGNOSIS — J43.8 OTHER EMPHYSEMA: ICD-10-CM

## 2022-02-16 DIAGNOSIS — Z71.6 ENCOUNTER FOR SMOKING CESSATION COUNSELING: ICD-10-CM

## 2022-02-16 DIAGNOSIS — F17.200 TOBACCO DEPENDENCE SYNDROME: ICD-10-CM

## 2022-02-16 DIAGNOSIS — F17.200 TOBACCO DEPENDENCE SYNDROME: Primary | ICD-10-CM

## 2022-02-16 PROCEDURE — 99214 OFFICE O/P EST MOD 30 MIN: CPT | Performed by: FAMILY MEDICINE

## 2022-02-16 RX ORDER — VARENICLINE TARTRATE 1 MG/1
1 TABLET, FILM COATED ORAL 2 TIMES DAILY
Qty: 56 TABLET | Refills: 1 | Status: SHIPPED | OUTPATIENT
Start: 2022-03-16 | End: 2022-05-11

## 2022-02-16 NOTE — PROGRESS NOTES
Subjective   Michael Bridges is a 67 y.o. male.     History of Present Illness   Mr. Bridges presents today for routine follow-up.    As well controlled diabetes mellitus.  Requesting foot exam taking qualify for diabetic shoes.  Complains of decreased sensation in both feet, denies open wounds or ulcers.    He has emphysema/COPD and is a chronic smoker.  Smoking approximate 1 pack/day. Began smoking as a teen, not sure of exact age.  Smoked anywhere from 1-3 ppd. Currently smoking 1ppd. Has approx 57 pk/yr hx.  Current symptoms include stable cough. SMITH stable.  Has tried to quit previously via medication, nicotine replacement, chantix and was successful for few months.  Feels smoking helps stress, pain.  Feels smoking cessation would benefit his health, save money.  Barriers to smoking cessation include chronic pain, cravings.    Recent PSA noted to be elevated.  Requested to return for follow-up and referral as indicated.    The following portions of the patient's history were reviewed and updated as appropriate: allergies, current medications, past family history, past medical history, past social history, past surgical history and problem list.    Review of Systems   Constitutional: Positive for fatigue. Negative for appetite change, fever and unexpected weight change.   HENT: Positive for congestion and postnasal drip. Negative for mouth sores, nosebleeds, rhinorrhea, sore throat and trouble swallowing.    Eyes: Negative for visual disturbance.   Respiratory: Positive for cough (dry, intermittent) and shortness of breath (mild with exertion). Negative for wheezing.    Cardiovascular: Positive for leg swelling (mild, stable). Negative for chest pain and palpitations.   Gastrointestinal: Negative for abdominal pain, constipation, nausea and vomiting.   Endocrine: Positive for cold intolerance. Negative for polydipsia and polyuria.   Genitourinary: Negative for decreased urine volume, dysuria and hematuria.    Musculoskeletal: Positive for arthralgias, back pain, gait problem, myalgias, neck pain and neck stiffness.   Skin: Negative for rash and wound.   Neurological: Positive for dizziness, tremors, weakness, numbness and headaches. Negative for syncope.   Hematological: Negative for adenopathy. Does not bruise/bleed easily.   Psychiatric/Behavioral: Positive for confusion (mild, intermittent) and sleep disturbance. Negative for dysphoric mood. The patient is nervous/anxious.    Pt's previous ROS reviewed and updated as indicated.       Objective    Vitals:    02/16/22 1019   BP: 120/76   Pulse: 68   Temp: 97.8 °F (36.6 °C)   SpO2: 95%     Body mass index is 23.43 kg/m².      02/16/22  1019   Weight: 76.2 kg (168 lb)       Physical Exam  Vitals and nursing note reviewed.   Constitutional:       General: He is not in acute distress.     Appearance: He is well-developed, well-groomed and normal weight. He is not ill-appearing.      Comments: Appears older than stated age.   HENT:      Head: Normocephalic and atraumatic.      Mouth/Throat:      Mouth: Mucous membranes are moist.   Eyes:      General: No scleral icterus.     Conjunctiva/sclera: Conjunctivae normal.   Cardiovascular:      Rate and Rhythm: Normal rate and regular rhythm.      Pulses:           Dorsalis pedis pulses are 1+ on the right side and 1+ on the left side.        Posterior tibial pulses are 1+ on the right side and 1+ on the left side.      Heart sounds: Heart sounds are distant.   Pulmonary:      Effort: Pulmonary effort is normal.      Breath sounds: Decreased breath sounds present. No wheezing, rhonchi or rales.   Musculoskeletal:      Right lower leg: No edema.      Left lower leg: No edema.   Feet:      Right foot:      Skin integrity: Callus present. No ulcer, skin breakdown or fissure.      Toenail Condition: Right toenails are abnormally thick. Fungal disease present.     Left foot:      Skin integrity: Callus present. No ulcer, skin  breakdown or fissure.      Toenail Condition: Left toenails are abnormally thick. Fungal disease present.     Comments: Diffusely decreased sensation on monofilament testing  Skin:     General: Skin is warm and dry.      Coloration: Skin is not jaundiced or pale.   Neurological:      Mental Status: He is alert and oriented to person, place, and time. Mental status is at baseline.      Motor: Weakness (lower extremities, appears at baseline) present.      Gait: Gait abnormal (antalgic, requires assistance, using single prong cane).   Psychiatric:         Mood and Affect: Mood and affect normal.         Behavior: Behavior normal. Behavior is cooperative.         Cognition and Memory: Cognition normal.     Pt's previous physical exam reviewed and updated as indicated.    Lab Results   Component Value Date    HGBA1C 5.70 (H) 01/12/2022    HGBA1C 5.60 07/06/2021    HGBA1C 5.60 10/01/2020     Lab Results   Component Value Date    MICROALBUR <3.0 01/12/2022    CREATININE 0.90 01/12/2022     Lab Results   Component Value Date    PSA 7.090 (H) 02/16/2022       Assessment/Plan   Diagnoses and all orders for this visit:    1. Well controlled diabetes mellitus (HCC) (Primary)    2. Tobacco dependence syndrome  -     varenicline (CHANTIX GLADIS) 0.5 MG X 11 & 1 MG X 42 tablet; Take 0.5 mg po daily x 3 days, then 0.5 mg po bid x 4 days, then 1 mg po bid  Dispense: 53 tablet; Refill: 0  -     varenicline (Chantix Continuing Month Gladis) 1 MG tablet; Take 1 tablet by mouth 2 (Two) Times a Day for 56 days.  Dispense: 56 tablet; Refill: 1    3. Other emphysema (HCC)    4. Elevated PSA    5. Encounter for smoking cessation counseling      COPD/emphysema generally stable. Tobacco cessation advised.  Pt voiced understanding of health risks of cont'd tobacco use.  Risks/benefits and potential side effects of various smoking cessation treatment options reviewed with patient.  Smoking cessation support options also reviewed with patient.   Patient voiced understanding and wishes to proceed with Chantix as above.  A total of 6 minutes dedicated to smoking cessation counseling during this visit.    Recheck PSA as previously ordered.  Refer as indicated.    NIDDM stable. Diabetic shoes recommended.    Routine f/u as scheduled, f/u sooner as needed/instructed.  I will contact patient regarding test results and provide instructions regarding any necessary changes in plan of care.  Patient was encouraged to keep me informed of any acute changes, lack of improvement, or any new concerning symptoms.  Pt is aware of reasons to seek emergent care.  Patient voiced understanding of all instructions and denied further questions.    Please note that portions of this note may have been completed with a voice recognition program. Efforts were made to edit the dictations, but occasionally words are mistranscribed.

## 2022-02-16 NOTE — TELEPHONE ENCOUNTER
Caller: Michael Bridges    Relationship to patient: Self    Best call back number: [phone]    THE PHARMACY DOES NOT HAVE THE PRESCRIPTION THAT WAS CALLED IN TO HELP TO QUIT SMOKING;  CAN AN ALTERNATIVE PRESCRIPTION BE CALLED IN TO    [facility] ([place]) - [place], [address], [address] - [phone]  - [phone] FX

## 2022-02-17 NOTE — TELEPHONE ENCOUNTER
Spoke with patient.  The information provided in the message was incorrect.  Patient had tried Chantix in the past.  He had to DC due to suicidal thoughts.  He is suggesting that you send in another medication. I will add chantix to allergy/sensitivity list.

## 2022-02-21 DIAGNOSIS — R97.20 ELEVATED PSA, LESS THAN 10 NG/ML: Primary | ICD-10-CM

## 2022-02-22 NOTE — TELEPHONE ENCOUNTER
Caller: Michael Bridges    Relationship to patient: Self    Best call back number: [phone]    Patient is needing: PATIENT WOULD LIKE TO KNOW IF THERE IS SOMETHING DIFFERENT THAN CHANTIX BECAUSE PHARMACY NO LONGER CARRIES THAT MEDICATION AND HIS REACTION WAS SUICIDAL THOUGHTS ON THIS  MEDICATION    PLEASE ADVISE

## 2022-02-24 RX ORDER — NICOTINE 4 MG/1
1 INHALANT RESPIRATORY (INHALATION) AS NEEDED
Qty: 168 EACH | Refills: 1 | Status: SHIPPED | OUTPATIENT
Start: 2022-02-24 | End: 2022-11-22

## 2022-07-12 ENCOUNTER — OFFICE VISIT (OUTPATIENT)
Dept: FAMILY MEDICINE CLINIC | Facility: CLINIC | Age: 67
End: 2022-07-12

## 2022-07-12 VITALS
DIASTOLIC BLOOD PRESSURE: 72 MMHG | OXYGEN SATURATION: 94 % | SYSTOLIC BLOOD PRESSURE: 130 MMHG | TEMPERATURE: 97.9 F | HEART RATE: 83 BPM | HEIGHT: 71 IN | BODY MASS INDEX: 23.1 KG/M2 | WEIGHT: 165 LBS

## 2022-07-12 DIAGNOSIS — F17.200 TOBACCO DEPENDENCE SYNDROME: ICD-10-CM

## 2022-07-12 DIAGNOSIS — I73.9 PERIPHERAL VASCULAR DISEASE, UNSPECIFIED: ICD-10-CM

## 2022-07-12 DIAGNOSIS — E78.2 MIXED HYPERLIPIDEMIA: ICD-10-CM

## 2022-07-12 DIAGNOSIS — E55.9 VITAMIN D DEFICIENCY: ICD-10-CM

## 2022-07-12 DIAGNOSIS — J43.8 OTHER EMPHYSEMA: ICD-10-CM

## 2022-07-12 DIAGNOSIS — R97.20 ABNORMAL PSA: ICD-10-CM

## 2022-07-12 DIAGNOSIS — E11.9 WELL CONTROLLED DIABETES MELLITUS: Primary | ICD-10-CM

## 2022-07-12 DIAGNOSIS — I25.10 CHRONIC CORONARY ARTERY DISEASE: ICD-10-CM

## 2022-07-12 DIAGNOSIS — E53.8 COBALAMIN DEFICIENCY: ICD-10-CM

## 2022-07-12 DIAGNOSIS — E11.42 DIABETIC POLYNEUROPATHY ASSOCIATED WITH TYPE 2 DIABETES MELLITUS: ICD-10-CM

## 2022-07-12 DIAGNOSIS — Z53.20 COLON CANCER SCREENING DECLINED: ICD-10-CM

## 2022-07-12 PROCEDURE — 99214 OFFICE O/P EST MOD 30 MIN: CPT | Performed by: FAMILY MEDICINE

## 2022-07-12 NOTE — PROGRESS NOTES
Subjective   Michael Bridges is a 67 y.o. male.     History of Present Illness   Mr. Bridges presents today for routine follow-up.     As well controlled diabetes mellitus. Complains of decreased sensation in both feet, denies open wounds or ulcers.     He has emphysema/COPD and is a chronic smoker.  Smoking approximate 1/2 pack/day. Began smoking as a teen, not sure of exact age.  Smoked anywhere from 1-3 ppd. Has approx 57 pk/yr hx. Current symptoms include stable cough. SMITH stable.  Has tried to quit previously via medication, nicotine replacement, chantix and was successful for few months.  Feels smoking helps stress, pain.  Feels smoking cessation would benefit his health, save money.  Barriers to smoking cessation include chronic pain, cravings, lives with smokers.    Chronic conditions include CAD, HLP, B12 def, vit D def, COPD/emphysema, PAD. On ASA, statin, enlapril. Denies new symptoms.     Previously elevated PSA.    Overdue for eye exam.    Declines colon cancer screening    Pt's previous HPI reviewed and updated as indicated.     The following portions of the patient's history were reviewed and updated as appropriate: allergies, current medications, past family history, past medical history, past social history, past surgical history and problem list.    Review of Systems   Constitutional: Positive for fatigue. Negative for appetite change, fever and unexpected weight change.   HENT: Positive for congestion and postnasal drip. Negative for mouth sores, nosebleeds, rhinorrhea, sore throat and trouble swallowing.    Eyes: Negative for visual disturbance.   Respiratory: Positive for cough (dry, intermittent) and shortness of breath (mild with exertion). Negative for wheezing.    Cardiovascular: Positive for leg swelling (mild, stable). Negative for chest pain and palpitations.   Gastrointestinal: Negative for abdominal pain, constipation, nausea and vomiting.   Endocrine: Positive for cold  intolerance. Negative for polydipsia and polyuria.   Genitourinary: Negative for decreased urine volume, dysuria and hematuria.   Musculoskeletal: Positive for arthralgias, back pain, gait problem, myalgias, neck pain and neck stiffness.   Skin: Negative for rash and wound.   Neurological: Positive for dizziness, tremors, weakness, numbness and headaches. Negative for syncope.   Hematological: Negative for adenopathy. Does not bruise/bleed easily.   Psychiatric/Behavioral: Positive for confusion (mild, intermittent) and sleep disturbance. Negative for dysphoric mood. The patient is nervous/anxious.    Pt's previous ROS reviewed and updated as indicated.       Objective    Vitals:    07/12/22 0850   BP: 130/72   Pulse: 83   Temp: 97.9 °F (36.6 °C)   SpO2: 94%     Body mass index is 23.01 kg/m².      07/12/22  0850   Weight: 74.8 kg (165 lb)       Physical Exam  Vitals and nursing note reviewed.   Constitutional:       General: He is not in acute distress.     Appearance: He is well-developed, well-groomed and normal weight. He is not ill-appearing.      Comments: Appears older than stated age.   HENT:      Mouth/Throat:      Mouth: Mucous membranes are moist.   Eyes:      General: No scleral icterus.     Conjunctiva/sclera: Conjunctivae normal.   Neck:      Thyroid: No thyroid mass.      Vascular: Normal carotid pulses. No carotid bruit.   Cardiovascular:      Rate and Rhythm: Normal rate and regular rhythm.      Pulses:           Dorsalis pedis pulses are 1+ on the right side and 1+ on the left side.        Posterior tibial pulses are 1+ on the right side and 1+ on the left side.      Heart sounds: Heart sounds are distant.   Pulmonary:      Effort: Pulmonary effort is normal.      Breath sounds: Decreased breath sounds present. No wheezing, rhonchi or rales.   Musculoskeletal:      Right lower leg: No edema.      Left lower leg: No edema.   Lymphadenopathy:      Cervical: No cervical adenopathy.   Skin:      General: Skin is warm and dry.      Coloration: Skin is not cyanotic, jaundiced or pale.      Findings: No bruising or rash.   Neurological:      Mental Status: He is alert and oriented to person, place, and time. Mental status is at baseline.      Motor: Weakness (lower extremities, appears at baseline) present.      Gait: Gait abnormal (antalgic, requires assistance, using single prong cane).   Psychiatric:         Mood and Affect: Mood and affect normal.         Behavior: Behavior normal. Behavior is cooperative.         Cognition and Memory: Cognition normal.     Pt's previous physical exam reviewed and updated as indicated.        Assessment & Plan   Diagnoses and all orders for this visit:    1. Well controlled diabetes mellitus (HCC) (Primary)  -     Hemoglobin A1c  -     Comprehensive Metabolic Panel  -     Ambulatory Referral for Diabetic Eye Exam-Optometry    2. Chronic coronary artery disease  -     Lipid Panel  -     Comprehensive Metabolic Panel  -     CBC & Differential    3. Mixed hyperlipidemia  -     Lipid Panel  -     Comprehensive Metabolic Panel    4. Cobalamin deficiency  -     Vitamin B12    5. Vitamin D deficiency    6. Tobacco dependence syndrome    7. Other emphysema (HCC)  -     CBC & Differential    8. Diabetic polyneuropathy associated with type 2 diabetes mellitus (HCC)    9. Peripheral vascular disease, unspecified (HCC)    10. Abnormal PSA  -     PSA DIAGNOSTIC    11. Colon cancer screening declined      Assess status of DM, tx as indicated. Patient encouraged to take meds (ASA, statin, ace-inh) as rx'd, follow diabetic appropriate diet, exercise daily, perform feet check daily, and have yearly diabetic eye exams.    CAD, HLP, PAD appearing stable. Continue medical mgnt. Pt advised to eat a heart healthy diet and get regular aerobic exercise.    Assess status of vit/min deficiencies and replace as indicated.    Tobacco cessation advised.  Pt voiced understanding of health risks of  cont'd tobacco use.    Recheck PSA and refer as indicated.    Routine f/u in 6 months, sooner as needed/instructed.  I will contact patient regarding test results and provide instructions regarding any necessary changes in plan of care.  Patient was encouraged to keep me informed of any acute changes, or any new concerning symptoms.  Pt is aware of reasons to seek emergent care.  Patient voiced understanding of all instructions and denied further questions.    Please note that portions of this note may have been completed with a voice recognition program. Efforts were made to edit the dictations, but occasionally words are mistranscribed.

## 2022-07-13 LAB
ALBUMIN SERPL-MCNC: 4.3 G/DL (ref 3.5–5.2)
ALBUMIN/GLOB SERPL: 2 G/DL
ALP SERPL-CCNC: 57 U/L (ref 39–117)
ALT SERPL-CCNC: 14 U/L (ref 1–41)
AST SERPL-CCNC: 19 U/L (ref 1–40)
BASOPHILS # BLD AUTO: 0.06 10*3/MM3 (ref 0–0.2)
BASOPHILS NFR BLD AUTO: 0.9 % (ref 0–1.5)
BILIRUB SERPL-MCNC: 0.5 MG/DL (ref 0–1.2)
BUN SERPL-MCNC: 14 MG/DL (ref 8–23)
BUN/CREAT SERPL: 15.2 (ref 7–25)
CALCIUM SERPL-MCNC: 9.1 MG/DL (ref 8.6–10.5)
CHLORIDE SERPL-SCNC: 103 MMOL/L (ref 98–107)
CHOLEST SERPL-MCNC: 186 MG/DL (ref 0–200)
CO2 SERPL-SCNC: 25.9 MMOL/L (ref 22–29)
CREAT SERPL-MCNC: 0.92 MG/DL (ref 0.76–1.27)
EGFRCR SERPLBLD CKD-EPI 2021: 91.2 ML/MIN/1.73
EOSINOPHIL # BLD AUTO: 0.12 10*3/MM3 (ref 0–0.4)
EOSINOPHIL NFR BLD AUTO: 1.9 % (ref 0.3–6.2)
ERYTHROCYTE [DISTWIDTH] IN BLOOD BY AUTOMATED COUNT: 14.6 % (ref 12.3–15.4)
GLOBULIN SER CALC-MCNC: 2.1 GM/DL
GLUCOSE SERPL-MCNC: 119 MG/DL (ref 65–99)
HBA1C MFR BLD: 5.6 % (ref 4.8–5.6)
HCT VFR BLD AUTO: 49.1 % (ref 37.5–51)
HDLC SERPL-MCNC: 49 MG/DL (ref 40–60)
HGB BLD-MCNC: 16.2 G/DL (ref 13–17.7)
IMM GRANULOCYTES # BLD AUTO: 0.01 10*3/MM3 (ref 0–0.05)
IMM GRANULOCYTES NFR BLD AUTO: 0.2 % (ref 0–0.5)
LDLC SERPL CALC-MCNC: 105 MG/DL (ref 0–100)
LYMPHOCYTES # BLD AUTO: 2.24 10*3/MM3 (ref 0.7–3.1)
LYMPHOCYTES NFR BLD AUTO: 35.2 % (ref 19.6–45.3)
MCH RBC QN AUTO: 29.7 PG (ref 26.6–33)
MCHC RBC AUTO-ENTMCNC: 33 G/DL (ref 31.5–35.7)
MCV RBC AUTO: 89.9 FL (ref 79–97)
MONOCYTES # BLD AUTO: 0.68 10*3/MM3 (ref 0.1–0.9)
MONOCYTES NFR BLD AUTO: 10.7 % (ref 5–12)
NEUTROPHILS # BLD AUTO: 3.26 10*3/MM3 (ref 1.7–7)
NEUTROPHILS NFR BLD AUTO: 51.1 % (ref 42.7–76)
NRBC BLD AUTO-RTO: 0 /100 WBC (ref 0–0.2)
PLATELET # BLD AUTO: 278 10*3/MM3 (ref 140–450)
POTASSIUM SERPL-SCNC: 4.5 MMOL/L (ref 3.5–5.2)
PROT SERPL-MCNC: 6.4 G/DL (ref 6–8.5)
PSA SERPL-MCNC: 10.3 NG/ML (ref 0–4)
RBC # BLD AUTO: 5.46 10*6/MM3 (ref 4.14–5.8)
SODIUM SERPL-SCNC: 138 MMOL/L (ref 136–145)
TRIGL SERPL-MCNC: 186 MG/DL (ref 0–150)
VIT B12 SERPL-MCNC: 258 PG/ML (ref 211–946)
VLDLC SERPL CALC-MCNC: 32 MG/DL (ref 5–40)
WBC # BLD AUTO: 6.37 10*3/MM3 (ref 3.4–10.8)

## 2022-07-18 DIAGNOSIS — R97.20 ELEVATED PSA: Primary | ICD-10-CM

## 2022-07-18 NOTE — PROGRESS NOTES
Inform his recent labs were fine other than:  1) PSA is quite high- it appears he no showed for his last apt with urology, new referral placed  2) B12 is low again, is he taking oral replacement?

## 2022-07-25 ENCOUNTER — CLINICAL SUPPORT (OUTPATIENT)
Dept: FAMILY MEDICINE CLINIC | Facility: CLINIC | Age: 67
End: 2022-07-25

## 2022-07-25 ENCOUNTER — OFFICE VISIT (OUTPATIENT)
Dept: UROLOGY | Facility: CLINIC | Age: 67
End: 2022-07-25

## 2022-07-25 VITALS
OXYGEN SATURATION: 90 % | TEMPERATURE: 98.9 F | HEART RATE: 59 BPM | SYSTOLIC BLOOD PRESSURE: 124 MMHG | HEIGHT: 71 IN | WEIGHT: 165 LBS | DIASTOLIC BLOOD PRESSURE: 60 MMHG | BODY MASS INDEX: 23.1 KG/M2

## 2022-07-25 DIAGNOSIS — E55.9 VITAMIN D DEFICIENCY: ICD-10-CM

## 2022-07-25 DIAGNOSIS — R97.20 ELEVATED PROSTATE SPECIFIC ANTIGEN (PSA): Primary | ICD-10-CM

## 2022-07-25 LAB
BILIRUB BLD-MCNC: NEGATIVE MG/DL
CLARITY, POC: CLEAR
COLOR UR: NORMAL
EXPIRATION DATE: NORMAL
GLUCOSE UR STRIP-MCNC: NEGATIVE MG/DL
KETONES UR QL: NEGATIVE
LEUKOCYTE EST, POC: NEGATIVE
Lab: NORMAL
NITRITE UR-MCNC: NEGATIVE MG/ML
PH UR: 6 [PH] (ref 5–8)
PROT UR STRIP-MCNC: NEGATIVE MG/DL
RBC # UR STRIP: NEGATIVE /UL
SP GR UR: 1.01 (ref 1–1.03)
UROBILINOGEN UR QL: NORMAL

## 2022-07-25 PROCEDURE — 81003 URINALYSIS AUTO W/O SCOPE: CPT | Performed by: UROLOGY

## 2022-07-25 PROCEDURE — 99204 OFFICE O/P NEW MOD 45 MIN: CPT | Performed by: UROLOGY

## 2022-07-25 PROCEDURE — 96372 THER/PROPH/DIAG INJ SC/IM: CPT | Performed by: FAMILY MEDICINE

## 2022-07-25 RX ORDER — CEPHALEXIN 500 MG/1
500 CAPSULE ORAL 2 TIMES DAILY
Qty: 6 CAPSULE | Refills: 0 | Status: SHIPPED | OUTPATIENT
Start: 2022-07-25 | End: 2022-07-28

## 2022-07-25 RX ORDER — CIPROFLOXACIN 500 MG/1
500 TABLET, FILM COATED ORAL 2 TIMES DAILY
Qty: 6 TABLET | Refills: 0 | Status: SHIPPED | OUTPATIENT
Start: 2022-07-25 | End: 2022-11-04 | Stop reason: SDUPTHER

## 2022-07-25 RX ORDER — MAGNESIUM HYDROXIDE 1200 MG/15ML
1 LIQUID ORAL ONCE
Qty: 1 ENEMA | Refills: 0 | Status: SHIPPED | OUTPATIENT
Start: 2022-07-25 | End: 2022-07-25

## 2022-07-25 RX ADMIN — CYANOCOBALAMIN 1000 MCG: 1000 INJECTION, SOLUTION INTRAMUSCULAR; SUBCUTANEOUS at 13:35
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
## 2022-07-26 ENCOUNTER — TELEPHONE (OUTPATIENT)
Dept: UROLOGY | Facility: CLINIC | Age: 67
End: 2022-07-26

## 2022-07-26 NOTE — TELEPHONE ENCOUNTER
Caller: Anabelle Bridges    Relationship to patient: Emergency Contact    Best call back number: [phone]    Patient is needing: PATIENT'S SPOUSE CALLED TO ASK ABOUT APPOINTMENT DATE. SHE STATES THE PATIENT WAS TOLD TO COME BACK IN 3 WEEKS AROUND 8/15/22 BUT APPOINTMENT WAS MADE FOR 9/15/22. SHE WANTS TO MAKE SURE IF THAT IS CORRECT OR DOES IT NEED TO BE RESCHEDULED FOR SOONER.     HUB ATTEMPTED TO WARM TRANSFER BUT WAS UNABLE TO REACH PRACTICE.     PATIENT'S SPOUSE REQUESTING A CALL BACK.

## 2022-10-10 DIAGNOSIS — F17.200 ENCOUNTER FOR SCREENING FOR MALIGNANT NEOPLASM OF LUNG IN CURRENT SMOKER WITH 30 PACK YEAR HISTORY OR GREATER: Primary | ICD-10-CM

## 2022-10-10 DIAGNOSIS — Z12.2 ENCOUNTER FOR SCREENING FOR MALIGNANT NEOPLASM OF LUNG IN CURRENT SMOKER WITH 30 PACK YEAR HISTORY OR GREATER: Primary | ICD-10-CM

## 2022-10-10 DIAGNOSIS — Z87.891 PERSONAL HISTORY OF SMOKING: ICD-10-CM

## 2022-10-25 ENCOUNTER — TELEPHONE (OUTPATIENT)
Dept: UROLOGY | Facility: CLINIC | Age: 67
End: 2022-10-25

## 2022-10-25 NOTE — TELEPHONE ENCOUNTER
Caller: Anabelle Bridges    Relationship to patient: Emergency Contact    Best call back number: [phone]    Patient is needing: PATIENT'S SPOUSE CALLING TO CHECK STATUS, SHE IS ASKING DIRECTIONS ABOUT 2 DIFFERENT ANTIBIOTICS. WHEN SHOULD HE START TAKING THE ANTIBIOTICS AND TO CONFIRM DIRECTIONS.     HUB ATTEMPTED TO WARM TRANSFER BUT WAS UNABLE TO REACH PRACTICE.     PATIENT'S SPOUSE REQUESTING A CALL BACK.

## 2022-10-25 NOTE — TELEPHONE ENCOUNTER
Caller: Anabelle Bridges    Relationship to patient: SPOUSE    Best call back number: [phone]    Patient is needing: PT'S WIFE CALLED TO CONFIRM DIRECTIONS ON THE MEDICATIONS PT IS SUPPOSED TO TAKE DAYS BEFORE HIS BIOPSY.    PLEASE GIVE HER A CALL BACK.

## 2022-11-03 ENCOUNTER — HOSPITAL ENCOUNTER (OUTPATIENT)
Dept: CT IMAGING | Facility: HOSPITAL | Age: 67
Discharge: HOME | End: 2022-11-03
Admitting: FAMILY MEDICINE

## 2022-11-03 DIAGNOSIS — F17.200 ENCOUNTER FOR SCREENING FOR MALIGNANT NEOPLASM OF LUNG IN CURRENT SMOKER WITH 30 PACK YEAR HISTORY OR GREATER: ICD-10-CM

## 2022-11-03 DIAGNOSIS — Z87.891 PERSONAL HISTORY OF SMOKING: ICD-10-CM

## 2022-11-03 DIAGNOSIS — Z12.2 ENCOUNTER FOR SCREENING FOR MALIGNANT NEOPLASM OF LUNG IN CURRENT SMOKER WITH 30 PACK YEAR HISTORY OR GREATER: ICD-10-CM

## 2022-11-03 PROCEDURE — 71271 CT THORAX LUNG CANCER SCR C-: CPT

## 2022-11-04 ENCOUNTER — TELEPHONE (OUTPATIENT)
Dept: UROLOGY | Facility: CLINIC | Age: 67
End: 2022-11-04

## 2022-11-04 DIAGNOSIS — R97.20 ELEVATED PROSTATE SPECIFIC ANTIGEN (PSA): ICD-10-CM

## 2022-11-04 RX ORDER — CIPROFLOXACIN 500 MG/1
500 TABLET, FILM COATED ORAL 2 TIMES DAILY
Qty: 6 TABLET | Refills: 0 | Status: SHIPPED | OUTPATIENT
Start: 2022-11-04 | End: 2022-11-21

## 2022-11-04 RX ORDER — CEPHALEXIN 500 MG/1
500 CAPSULE ORAL 2 TIMES DAILY
Qty: 6 CAPSULE | Refills: 0 | Status: SHIPPED | OUTPATIENT
Start: 2022-11-04 | End: 2022-11-07

## 2022-11-04 NOTE — TELEPHONE ENCOUNTER
Called Anabelle patient's wife informed her we do want patient to take Cipro and Keflex starting Sunday the day prior to the procedure and take for 3 days.  He also needs to use a fleets enema the morning prior to procedure she reports they have that and do not need another prescription sent in.  I have sent antibiotics to [facility] in [place]

## 2022-11-04 NOTE — TELEPHONE ENCOUNTER
Caller: Anabelle Bridges     Relationship: [unfilled] SPOUSE    Best call back number: [phone]    What is your medical concern? PT CALLED REGARDING HIS PROCEDURE ON Monday 11/07/2022. WOULD LIKE TO KNOW IF HE NEEDS TO TAKE ANTIBIOTIC PRIOR TO Monday.

## 2022-11-07 ENCOUNTER — PROCEDURE VISIT (OUTPATIENT)
Dept: UROLOGY | Facility: CLINIC | Age: 67
End: 2022-11-07

## 2022-11-07 ENCOUNTER — TELEPHONE (OUTPATIENT)
Dept: UROLOGY | Facility: CLINIC | Age: 67
End: 2022-11-07

## 2022-11-07 DIAGNOSIS — R97.20 ELEVATED PROSTATE SPECIFIC ANTIGEN (PSA): Primary | ICD-10-CM

## 2022-11-07 PROCEDURE — 99024 POSTOP FOLLOW-UP VISIT: CPT | Performed by: UROLOGY

## 2022-11-07 PROCEDURE — 55700 PR PROSTATE NEEDLE BIOPSY ANY APPROACH: CPT | Performed by: UROLOGY

## 2022-11-07 PROCEDURE — 76942 ECHO GUIDE FOR BIOPSY: CPT | Performed by: UROLOGY

## 2022-11-07 NOTE — PROGRESS NOTES
Inform pt no findings concerning for cancer on his recent CT chest for lung cancer screening. Repeat in 1 year.

## 2022-11-07 NOTE — PROGRESS NOTES
TRUS BIOPSY OF PROSTATE    Preoperative diagnosis  Elevated PSA    Postoperative diagnosis  Elevated PSA    Procedure  1.  Transrectal ultrasound of the prostate  2.  Transrectal ultrasound guidance of needle biopsy  3.  Prostate biopsy    Attending Surgeon  Ramiro Coe MD    Anesthesia  2% lidocaine jelly, intrarectal instillation, 10mL  1% lidocaine solution, periprostatic injection, 10mL    Complications  None    Specimen  Prostate biopsy x 14    Indications  Mr. Bridges is a 67 y.o. year old male with an elevated PSA:   Lab Results   Component Value Date    PSA 10.300 (H) 07/12/2022    PSA 7.090 (H) 02/16/2022    PSA 8.030 (H) 01/12/2022           After discussing his options, the patient decided to proceed with prostate biopsy.  Informed consent was obtained. Possible complications were discussed with the patient during his last visit including, but not limited to, hematuria, hematochezia, prostatitis, urinary tract infection, sepsis, and urinary retention.  He did start the prescribed oral antibiotic regimen.    Procedure  The patient was positioned and prepped in a left lateral position with lower extremities flexed.  Lidocaine jelly, 2%, was injected per rectum. A digital rectal exam was performed.The [employer] E8CS rectal ultrasound probe was slowly introduced into the rectum without difficulty.  The prostate and seminal vesicles were inspected systematically using cross and sagittal views with the ultrasound.  The dimensions of the prostate were measured, for a calculated volume of 31 mL.  Using a true cut 14 Fr biopsy needle, 14 prostate cores were collected. The specific locations were the following: left lateral base, left lateral mid, left lateral apex, left medial base, left medial mid, and left medial apex, right lateral base, right lateral mid, right lateral apex, right medial base, right medial mid, right medial apex, and right and left transition zones. The rectal ultrasound probe was removed.  A DENNISE  was again performed revealing little blood in the rectum.  The patient tolerated the procedure well.    Plan  1.  The patient was instructed to drink plenty of fluids and warned about possible complications and side effects including, but not limited to, blood in the urine, stool and semen as well as bloodstream infection.  He was instructed to call the office if there are any issues, especially fevers or flu-like symptoms.    2.  Continue antibiotic for a total of 3 days.  3.  The patient will return to clinic in approximately 1 week for discussion of the pathological report.

## 2022-11-07 NOTE — PROGRESS NOTES
Patient and his wife returned this afternoon with complaining of blood and a blood clots came out with urination when he got home. Patient was nervous and upset that this happen and was afraid he was going to continue to bleed.I assured the patient that some bleeding and clots can happen after a prostate biopsy and that it was normal. Patient has Diabetes and had not ate much, I advised patient to go home and eat, take some tylenol and lay down and rest and drink plenty of fluids.    22fr. Mullen was placed and flushed, no blood or clot came out only clear urine, I taught patient and his wife how to remove the mullen tomorrow at home per 's instructions and they were satisfied with this plan, I told them if they had any trouble to call our office.    When the wife called she stated patient was having some burning in his stomach and chest, I asked patient if he still had any discomfort at all and he said no that it had subsided. I advised patient if he had any chest pain or discomfort to start again to go the ER per Dr. Coe.    Venita Mcclain, Practice Manager.

## 2022-11-07 NOTE — TELEPHONE ENCOUNTER
Provider: DR OLMOS  Caller: GRICELDA DOBSON  Relationship to Patient: DAUGHTER  Phone Number: [phone] OR WIFE CLARENCE [phone]    Reason for Call: PT HAD BIOPSY TODAY AND SINCE HE GOT HOME HE IS LEAVING A LOT OF BLOOD IN THE TOILET, HE CAN'T URINATE EXCEPT BLOOD, HIS CHEST FEELS LIKE IT IS ON FIRE, AND HE FEELS LIKE HE IS GOING TO PASS OUT. PT IS DIABETIC.  When was the patient last seen: 11/7/22  When did it start: TODAY

## 2022-11-09 LAB — REF LAB TEST METHOD: NORMAL

## 2022-11-14 ENCOUNTER — TELEPHONE (OUTPATIENT)
Dept: FAMILY MEDICINE CLINIC | Facility: CLINIC | Age: 67
End: 2022-11-14

## 2022-11-14 NOTE — TELEPHONE ENCOUNTER
Caller: Anabelle Bridges    Relationship: Emergency Contact    Best call back number: [phone]    What form or medical record are you requesting: HANDICAP PLATE FORM    Who is requesting this form or medical record from you: PATIENT    How would you like to receive the form or medical records (pick-up, mail, fax): PICKUP    Timeframe paperwork needed: REQUESTING TODAY    Additional notes: PATIENTS WIFE CALLED IN AND REQUESTED TO GET THE PAPERWORK FILLED OUT SO THEY CAN GET ANOTHER HANDICAP PLATE. CALLBACK IS REQUESTED WHEN COMPLETE

## 2022-11-14 NOTE — TELEPHONE ENCOUNTER
PATIENTS WIFE WALKED IN TO SEE IF HANDICAP FORM WAS READY. I HAD HER FILL OUT HIS INFO, AND PUT IT IN PCP BOX. INFORMED HER THAT SOMEONE WOULD CALL WHEN READY.

## 2022-11-21 ENCOUNTER — OFFICE VISIT (OUTPATIENT)
Dept: UROLOGY | Facility: CLINIC | Age: 67
End: 2022-11-21

## 2022-11-21 VITALS
HEIGHT: 71 IN | HEART RATE: 82 BPM | SYSTOLIC BLOOD PRESSURE: 122 MMHG | WEIGHT: 165 LBS | OXYGEN SATURATION: 97 % | TEMPERATURE: 98.6 F | BODY MASS INDEX: 23.1 KG/M2 | DIASTOLIC BLOOD PRESSURE: 64 MMHG

## 2022-11-21 DIAGNOSIS — R97.20 ELEVATED PROSTATE SPECIFIC ANTIGEN (PSA): Primary | ICD-10-CM

## 2022-11-21 DIAGNOSIS — C61 PROSTATE CANCER: ICD-10-CM

## 2022-11-21 DIAGNOSIS — K59.00 CONSTIPATION, UNSPECIFIED CONSTIPATION TYPE: ICD-10-CM

## 2022-11-21 PROCEDURE — 99214 OFFICE O/P EST MOD 30 MIN: CPT | Performed by: UROLOGY

## 2022-11-21 RX ORDER — POLYETHYLENE GLYCOL 3350 17 G/17G
17 POWDER, FOR SOLUTION ORAL DAILY
Qty: 30 PACKET | Refills: 11 | Status: SHIPPED | OUTPATIENT
Start: 2022-11-21

## 2022-11-21 RX ORDER — OXYCODONE HYDROCHLORIDE 10 MG/1
TABLET ORAL
COMMUNITY
Start: 2022-11-19
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
## 2022-11-22 ENCOUNTER — TELEPHONE (OUTPATIENT)
Dept: FAMILY MEDICINE CLINIC | Facility: CLINIC | Age: 67
End: 2022-11-22

## 2022-11-22 ENCOUNTER — OFFICE VISIT (OUTPATIENT)
Dept: FAMILY MEDICINE CLINIC | Facility: CLINIC | Age: 67
End: 2022-11-22

## 2022-11-22 VITALS
WEIGHT: 165 LBS | TEMPERATURE: 98.1 F | SYSTOLIC BLOOD PRESSURE: 126 MMHG | OXYGEN SATURATION: 97 % | BODY MASS INDEX: 23.1 KG/M2 | HEIGHT: 71 IN | DIASTOLIC BLOOD PRESSURE: 60 MMHG | HEART RATE: 75 BPM

## 2022-11-22 DIAGNOSIS — E11.9 WELL CONTROLLED DIABETES MELLITUS: ICD-10-CM

## 2022-11-22 DIAGNOSIS — E53.8 COBALAMIN DEFICIENCY: ICD-10-CM

## 2022-11-22 DIAGNOSIS — Z23 NEED FOR INFLUENZA VACCINATION: ICD-10-CM

## 2022-11-22 DIAGNOSIS — M79.605 BILATERAL LEG PAIN: ICD-10-CM

## 2022-11-22 DIAGNOSIS — M79.604 BILATERAL LEG PAIN: ICD-10-CM

## 2022-11-22 DIAGNOSIS — E55.9 VITAMIN D DEFICIENCY: ICD-10-CM

## 2022-11-22 DIAGNOSIS — C61 PROSTATE CANCER: Primary | ICD-10-CM

## 2022-11-22 DIAGNOSIS — R79.9 ABNORMAL FINDING OF BLOOD CHEMISTRY, UNSPECIFIED: ICD-10-CM

## 2022-11-22 DIAGNOSIS — J43.8 OTHER EMPHYSEMA: ICD-10-CM

## 2022-11-22 PROCEDURE — 90662 IIV NO PRSV INCREASED AG IM: CPT | Performed by: FAMILY MEDICINE

## 2022-11-22 PROCEDURE — 96372 THER/PROPH/DIAG INJ SC/IM: CPT | Performed by: FAMILY MEDICINE

## 2022-11-22 PROCEDURE — G0008 ADMIN INFLUENZA VIRUS VAC: HCPCS | Performed by: FAMILY MEDICINE

## 2022-11-22 PROCEDURE — 99214 OFFICE O/P EST MOD 30 MIN: CPT | Performed by: FAMILY MEDICINE

## 2022-11-22 RX ADMIN — CYANOCOBALAMIN 1000 MCG: 1000 INJECTION, SOLUTION INTRAMUSCULAR; SUBCUTANEOUS at 10:37

## 2022-11-22 NOTE — PROGRESS NOTES
"Subjective   Michael Ned Bridges is a 67 y.o. male.     History of Present Illness   Mrs. Bridges presents today for f/u after seeing urologist and geing given dx of prostate CA. Recommendation appears to be active surveillance with f/u scans/labs 6 months.    He voices concern about \"cancer spreading'.     Chronic conditions include CAD, HLP, copd/emphysema, NIDDM. Taking meds as rx'd. Does c/o increased bilateral leg pain. Has chronic leg pain due to L-DDD/DJD.      The following portions of the patient's history were reviewed and updated as appropriate: allergies, current medications, past family history, past medical history, past social history, past surgical history and problem list.    Review of Systems   Constitutional: Positive for fatigue. Negative for appetite change, fever and unexpected weight change.   HENT: Positive for congestion and postnasal drip. Negative for mouth sores, nosebleeds, rhinorrhea, sore throat and trouble swallowing.    Eyes: Negative for visual disturbance.   Respiratory: Positive for cough (dry, intermittent) and shortness of breath (mild with exertion). Negative for wheezing.    Cardiovascular: Positive for leg swelling (mild, stable). Negative for chest pain and palpitations.   Gastrointestinal: Negative for abdominal pain, constipation, nausea and vomiting.   Endocrine: Positive for cold intolerance. Negative for polydipsia and polyuria.   Genitourinary: Negative for decreased urine volume, dysuria and hematuria.   Musculoskeletal: Positive for arthralgias, back pain, gait problem, myalgias, neck pain and neck stiffness.   Skin: Negative for rash and wound.   Neurological: Positive for dizziness, tremors, weakness, numbness and headaches. Negative for syncope.   Hematological: Negative for adenopathy. Does not bruise/bleed easily.   Psychiatric/Behavioral: Positive for confusion (mild, intermittent) and sleep disturbance. Negative for dysphoric mood. The patient is " nervous/anxious.    Pt's previous ROS reviewed and updated as indicated.       Objective    Vitals:    11/22/22 0955   BP: 126/60   Pulse: 75   Temp: 98.1 °F (36.7 °C)   SpO2: 97%     Body mass index is 23.01 kg/m².      11/22/22 0955   Weight: 74.8 kg (165 lb)       Physical Exam  Vitals and nursing note reviewed.   Constitutional:       General: He is not in acute distress.     Appearance: He is well-developed, well-groomed and normal weight. He is not ill-appearing.   Cardiovascular:      Rate and Rhythm: Normal rate and regular rhythm.      Heart sounds: Normal heart sounds.   Pulmonary:      Effort: Pulmonary effort is normal.      Breath sounds: Decreased breath sounds present. No wheezing, rhonchi or rales.   Musculoskeletal:      Right lower leg: No edema.      Left lower leg: No edema.   Skin:     General: Skin is warm and dry.      Coloration: Skin is not jaundiced or pale.   Neurological:      Mental Status: He is alert and oriented to person, place, and time. Mental status is at baseline.   Psychiatric:         Mood and Affect: Mood and affect normal.         Behavior: Behavior normal. Behavior is cooperative.         Cognition and Memory: Cognition normal.       CT Chest Low Dose Cancer Screening WO  Result Date: 11/3/2022  No suspicious pulmonary mass or nodule. Lung RADS category 2.  RECOMMENDATIONS:Annual low-dose chest CT.    Images were reviewed, interpreted, and dictated by YOANA Sylvester Transcribed by Naye Bennett PA-C.  This report was signed and finalized on 11/3/2022 4:13 PM by YOANA Zavaleta.    Lab Results   Component Value Date    PSA 10.300 (H) 07/12/2022    PSA 7.090 (H) 02/16/2022    PSA 8.030 (H) 01/12/2022       Assessment & Plan   Diagnoses and all orders for this visit:    1. Prostate cancer (HCC) (Primary)    2. Need for influenza vaccination  -     Fluzone High-Dose 65+yrs (2865-6263)    3. Bilateral leg pain  -     CBC & Differential  -     Comprehensive  Metabolic Panel  -     Sedimentation Rate  -     C-reactive protein  -     CK  -     Iron    4. Well controlled diabetes mellitus (HCC)  -     Comprehensive Metabolic Panel    5. Other emphysema (HCC)    6. Vitamin D deficiency  -     Vitamin D,25-Hydroxy    7. Cobalamin deficiency  -     CBC & Differential    8. Abnormal finding of blood chemistry, unspecified  -     Iron       I have reviewed with him urology consult note, pathology per Dr. Coe. He requests referral for consultation for second opinion but is amenable to seeing whomever Dr. Coe would recommend.     Lab eval for mgt of chronic med issues, c/o leg pain as above.    Continue current medical mgnt.     I will try to coordinate with Dr. Coe's office regarding referral plan.    Routine f/u as scheduled, sooner as needed/instructed.  I will contact patient regarding test results and provide instructions regarding any necessary changes in plan of care.  Patient was encouraged to keep me informed of any acute changes, lack of improvement, or any new concerning symptoms.  Pt is aware of reasons to seek emergent care.  Patient voiced understanding of all instructions and denied further questions.    Please note that portions of this note may have been completed with a voice recognition program.             NOTE TO PATIENT: The 21st Century Cures Act makes medical notes like these available to patients in the interest of transparency. However, be advised this is a medical document. It is intended as peer to peer communication. It is written in medical language and may contain abbreviations or verbiage that are unfamiliar. It may appear blunt or direct. Medical documents are intended to carry relevant information, facts as evident, and the clinical opinion of the practitioner.

## 2022-11-22 NOTE — TELEPHONE ENCOUNTER
Wife was wondering if her husbands handicap plate paper was signed by Dr. Priest. She forgot to ask when she came in with  for his appointment. She said it is just hard to go into [facility] and such.

## 2022-11-23 ENCOUNTER — TELEPHONE (OUTPATIENT)
Dept: FAMILY MEDICINE CLINIC | Facility: CLINIC | Age: 67
End: 2022-11-23

## 2022-11-23 DIAGNOSIS — E55.9 VITAMIN D DEFICIENCY: ICD-10-CM

## 2022-11-23 PROBLEM — C61 PROSTATE CANCER (HCC): Status: ACTIVE | Noted: 2022-11-23

## 2022-11-23 LAB
25(OH)D3+25(OH)D2 SERPL-MCNC: 22.1 NG/ML (ref 30–100)
ALBUMIN SERPL-MCNC: 4.2 G/DL (ref 3.5–5.2)
ALBUMIN/GLOB SERPL: 1.9 G/DL
ALP SERPL-CCNC: 69 U/L (ref 39–117)
ALT SERPL-CCNC: 6 U/L (ref 1–41)
AST SERPL-CCNC: 17 U/L (ref 1–40)
BASOPHILS # BLD AUTO: 0.06 10*3/MM3 (ref 0–0.2)
BASOPHILS NFR BLD AUTO: 0.7 % (ref 0–1.5)
BILIRUB SERPL-MCNC: 0.3 MG/DL (ref 0–1.2)
BUN SERPL-MCNC: 11 MG/DL (ref 8–23)
BUN/CREAT SERPL: 12.5 (ref 7–25)
CALCIUM SERPL-MCNC: 9.1 MG/DL (ref 8.6–10.5)
CHLORIDE SERPL-SCNC: 103 MMOL/L (ref 98–107)
CK SERPL-CCNC: 90 U/L (ref 20–200)
CO2 SERPL-SCNC: 29.7 MMOL/L (ref 22–29)
CREAT SERPL-MCNC: 0.88 MG/DL (ref 0.76–1.27)
CRP SERPL-MCNC: <0.3 MG/DL (ref 0–0.5)
EGFRCR SERPLBLD CKD-EPI 2021: 94.2 ML/MIN/1.73
EOSINOPHIL # BLD AUTO: 0.04 10*3/MM3 (ref 0–0.4)
EOSINOPHIL NFR BLD AUTO: 0.5 % (ref 0.3–6.2)
ERYTHROCYTE [DISTWIDTH] IN BLOOD BY AUTOMATED COUNT: 13.9 % (ref 12.3–15.4)
ERYTHROCYTE [SEDIMENTATION RATE] IN BLOOD BY WESTERGREN METHOD: 6 MM/HR (ref 0–20)
GLOBULIN SER CALC-MCNC: 2.2 GM/DL
GLUCOSE SERPL-MCNC: 106 MG/DL (ref 65–99)
HCT VFR BLD AUTO: 46.6 % (ref 37.5–51)
HGB BLD-MCNC: 15.4 G/DL (ref 13–17.7)
IMM GRANULOCYTES # BLD AUTO: 0.03 10*3/MM3 (ref 0–0.05)
IMM GRANULOCYTES NFR BLD AUTO: 0.4 % (ref 0–0.5)
IRON SERPL-MCNC: 92 MCG/DL (ref 59–158)
LYMPHOCYTES # BLD AUTO: 2.12 10*3/MM3 (ref 0.7–3.1)
LYMPHOCYTES NFR BLD AUTO: 25.8 % (ref 19.6–45.3)
MCH RBC QN AUTO: 30 PG (ref 26.6–33)
MCHC RBC AUTO-ENTMCNC: 33 G/DL (ref 31.5–35.7)
MCV RBC AUTO: 90.8 FL (ref 79–97)
MONOCYTES # BLD AUTO: 0.53 10*3/MM3 (ref 0.1–0.9)
MONOCYTES NFR BLD AUTO: 6.4 % (ref 5–12)
NEUTROPHILS # BLD AUTO: 5.45 10*3/MM3 (ref 1.7–7)
NEUTROPHILS NFR BLD AUTO: 66.2 % (ref 42.7–76)
NRBC BLD AUTO-RTO: 0 /100 WBC (ref 0–0.2)
PLATELET # BLD AUTO: 294 10*3/MM3 (ref 140–450)
POTASSIUM SERPL-SCNC: 4.5 MMOL/L (ref 3.5–5.2)
PROT SERPL-MCNC: 6.4 G/DL (ref 6–8.5)
RBC # BLD AUTO: 5.13 10*6/MM3 (ref 4.14–5.8)
SODIUM SERPL-SCNC: 140 MMOL/L (ref 136–145)
WBC # BLD AUTO: 8.23 10*3/MM3 (ref 3.4–10.8)

## 2022-11-23 NOTE — PROGRESS NOTES
Inform pt recent labs stable other than persistently low vit D. 5000 units daily on med list. Has he been taking that regularly?

## 2022-12-06 NOTE — TELEPHONE ENCOUNTER
Please confirm with pt if he has received apt with specialist in [place] regarding his prostate cancer

## 2022-12-09 ENCOUNTER — TELEPHONE (OUTPATIENT)
Dept: UROLOGY | Facility: CLINIC | Age: 67
End: 2022-12-09

## 2022-12-09 NOTE — TELEPHONE ENCOUNTER
Spoke to spouse they have pathology report which has diagnosis on it. She states she will let me know if she needs anything else.  NC/CMA

## 2022-12-09 NOTE — TELEPHONE ENCOUNTER
Caller: Anabelle Edward    Relationship: Emergency Contact    Best call back number: [phone]    What form or medical record are you requesting: OFFICE NOTE EXPLAINING DIAGNOSIS    How would you like to receive the form or medical records (pick-up, mail, fax): PICK-UP    Timeframe paperwork needed: TODAY    Additional notes: DR. OLMOS DIAGNOSED PT WITH PROSTATE CANCER. PT’S WIFE NEEDS PAPERWORK EXPLAINING THE DIAGNOSIS SO SHE CAN GIVE IT TO OTHER DOCTORS. MRS. EDWARD WOULD LIKE TO PICK THE DOCS UP TODAY. SHE CAN BE REACHED [phone].

## 2022-12-15 ENCOUNTER — TELEPHONE (OUTPATIENT)
Dept: UROLOGY | Facility: CLINIC | Age: 67
End: 2022-12-15

## 2022-12-15 NOTE — TELEPHONE ENCOUNTER
I called patient regarding his message from yesterday about a note from Dr. Wolfe. I informed patient that we have not received anything from  regarding a note or anything about moving up his MRI. Patient stated that was fine and he would talk with .    Venita

## 2022-12-15 NOTE — TELEPHONE ENCOUNTER
Caller: ALEAH     Relationship to patient: SPOUSE    Best call back number: [phone]    Patient is needing: CHECKING ON NOTE DR HAILE SENT TO DR OLMOS YESTERDAY PERTAINING TO MOVING UP MRI ORDER.  PLEASE CALL PT TO DISCUSS

## 2022-12-16 ENCOUNTER — OFFICE VISIT (OUTPATIENT)
Dept: UROLOGY | Facility: CLINIC | Age: 67
End: 2022-12-16

## 2022-12-16 VITALS
WEIGHT: 165 LBS | RESPIRATION RATE: 19 BRPM | BODY MASS INDEX: 23.1 KG/M2 | HEART RATE: 81 BPM | HEIGHT: 71 IN | SYSTOLIC BLOOD PRESSURE: 134 MMHG | OXYGEN SATURATION: 96 % | DIASTOLIC BLOOD PRESSURE: 86 MMHG | TEMPERATURE: 97.7 F

## 2022-12-16 DIAGNOSIS — R10.30 LOWER ABDOMINAL PAIN: Primary | ICD-10-CM

## 2022-12-16 LAB
BILIRUB BLD-MCNC: NEGATIVE MG/DL
CLARITY, POC: CLEAR
COLOR UR: YELLOW
EXPIRATION DATE: ABNORMAL
GLUCOSE UR STRIP-MCNC: NEGATIVE MG/DL
KETONES UR QL: NEGATIVE
LEUKOCYTE EST, POC: ABNORMAL
Lab: ABNORMAL
NITRITE UR-MCNC: NEGATIVE MG/ML
PH UR: 6 [PH] (ref 5–8)
PROT UR STRIP-MCNC: NEGATIVE MG/DL
RBC # UR STRIP: NEGATIVE /UL
SP GR UR: 1.02 (ref 1–1.03)
UROBILINOGEN UR QL: NORMAL

## 2022-12-16 PROCEDURE — 81003 URINALYSIS AUTO W/O SCOPE: CPT | Performed by: NURSE PRACTITIONER

## 2022-12-16 PROCEDURE — 99213 OFFICE O/P EST LOW 20 MIN: CPT | Performed by: NURSE PRACTITIONER

## 2022-12-16 RX ORDER — OMEPRAZOLE 40 MG/1
40 CAPSULE, DELAYED RELEASE ORAL DAILY
COMMUNITY
Start: 2022-10-03 | End: 2023-02-01 | Stop reason: SDUPTHER
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
## 2022-12-20 ENCOUNTER — HOSPITAL ENCOUNTER (OUTPATIENT)
Dept: CT IMAGING | Facility: HOSPITAL | Age: 67
Discharge: HOME | End: 2022-12-20
Admitting: NURSE PRACTITIONER

## 2022-12-20 DIAGNOSIS — R10.30 LOWER ABDOMINAL PAIN: ICD-10-CM

## 2022-12-20 PROCEDURE — 74176 CT ABD & PELVIS W/O CONTRAST: CPT

## 2022-12-21 ENCOUNTER — OFFICE VISIT (OUTPATIENT)
Dept: UROLOGY | Facility: CLINIC | Age: 67
End: 2022-12-21

## 2022-12-21 VITALS
HEIGHT: 71 IN | OXYGEN SATURATION: 95 % | SYSTOLIC BLOOD PRESSURE: 136 MMHG | DIASTOLIC BLOOD PRESSURE: 74 MMHG | BODY MASS INDEX: 23.1 KG/M2 | HEART RATE: 67 BPM | WEIGHT: 165 LBS | TEMPERATURE: 96.8 F

## 2022-12-21 DIAGNOSIS — R10.9 ABDOMINAL PAIN, UNSPECIFIED ABDOMINAL LOCATION: Primary | ICD-10-CM

## 2022-12-21 PROCEDURE — 99213 OFFICE O/P EST LOW 20 MIN: CPT | Performed by: NURSE PRACTITIONER
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
## 2023-02-01 ENCOUNTER — OFFICE VISIT (OUTPATIENT)
Dept: FAMILY MEDICINE CLINIC | Facility: CLINIC | Age: 68
End: 2023-02-01
Payer: MEDICARE

## 2023-02-01 VITALS
WEIGHT: 164 LBS | SYSTOLIC BLOOD PRESSURE: 118 MMHG | BODY MASS INDEX: 22.96 KG/M2 | HEIGHT: 71 IN | TEMPERATURE: 98.5 F | OXYGEN SATURATION: 95 % | HEART RATE: 77 BPM | DIASTOLIC BLOOD PRESSURE: 72 MMHG

## 2023-02-01 DIAGNOSIS — K21.9 GASTROESOPHAGEAL REFLUX DISEASE, UNSPECIFIED WHETHER ESOPHAGITIS PRESENT: ICD-10-CM

## 2023-02-01 DIAGNOSIS — R51.9 CHRONIC DAILY HEADACHE: ICD-10-CM

## 2023-02-01 DIAGNOSIS — E55.9 VITAMIN D DEFICIENCY: ICD-10-CM

## 2023-02-01 DIAGNOSIS — C61 PROSTATE CANCER: ICD-10-CM

## 2023-02-01 DIAGNOSIS — Z23 NEED FOR PNEUMOCOCCAL VACCINATION: ICD-10-CM

## 2023-02-01 DIAGNOSIS — E53.8 COBALAMIN DEFICIENCY: ICD-10-CM

## 2023-02-01 DIAGNOSIS — Z12.11 SCREEN FOR COLON CANCER: ICD-10-CM

## 2023-02-01 DIAGNOSIS — E78.2 MIXED HYPERLIPIDEMIA: ICD-10-CM

## 2023-02-01 DIAGNOSIS — E53.8 VITAMIN B12 DEFICIENCY: ICD-10-CM

## 2023-02-01 DIAGNOSIS — F17.200 TOBACCO USE DISORDER: ICD-10-CM

## 2023-02-01 DIAGNOSIS — I25.10 CHRONIC CORONARY ARTERY DISEASE: ICD-10-CM

## 2023-02-01 DIAGNOSIS — J43.8 OTHER EMPHYSEMA: ICD-10-CM

## 2023-02-01 DIAGNOSIS — G89.4 CHRONIC PAIN SYNDROME: ICD-10-CM

## 2023-02-01 DIAGNOSIS — G25.81 RESTLESS LEGS SYNDROME: ICD-10-CM

## 2023-02-01 DIAGNOSIS — Z79.891 CHRONIC PRESCRIPTION OPIATE USE: ICD-10-CM

## 2023-02-01 DIAGNOSIS — E11.42 DIABETIC POLYNEUROPATHY ASSOCIATED WITH TYPE 2 DIABETES MELLITUS: ICD-10-CM

## 2023-02-01 DIAGNOSIS — E11.9 WELL CONTROLLED DIABETES MELLITUS: ICD-10-CM

## 2023-02-01 DIAGNOSIS — Z53.20 COLONOSCOPY REFUSED: ICD-10-CM

## 2023-02-01 DIAGNOSIS — Z00.00 MEDICARE ANNUAL WELLNESS VISIT, SUBSEQUENT: Primary | ICD-10-CM

## 2023-02-01 PROCEDURE — 1160F RVW MEDS BY RX/DR IN RCRD: CPT | Performed by: FAMILY MEDICINE

## 2023-02-01 PROCEDURE — 90677 PCV20 VACCINE IM: CPT | Performed by: FAMILY MEDICINE

## 2023-02-01 PROCEDURE — G0009 ADMIN PNEUMOCOCCAL VACCINE: HCPCS | Performed by: FAMILY MEDICINE

## 2023-02-01 PROCEDURE — 99214 OFFICE O/P EST MOD 30 MIN: CPT | Performed by: FAMILY MEDICINE

## 2023-02-01 PROCEDURE — 96372 THER/PROPH/DIAG INJ SC/IM: CPT | Performed by: FAMILY MEDICINE

## 2023-02-01 PROCEDURE — 1125F AMNT PAIN NOTED PAIN PRSNT: CPT | Performed by: FAMILY MEDICINE

## 2023-02-01 PROCEDURE — 1170F FXNL STATUS ASSESSED: CPT | Performed by: FAMILY MEDICINE

## 2023-02-01 PROCEDURE — G0439 PPPS, SUBSEQ VISIT: HCPCS | Performed by: FAMILY MEDICINE

## 2023-02-01 RX ORDER — SIMVASTATIN 20 MG
20 TABLET ORAL NIGHTLY
Qty: 30 TABLET | Refills: 11 | Status: SHIPPED | OUTPATIENT
Start: 2023-02-01

## 2023-02-01 RX ADMIN — CYANOCOBALAMIN 1000 MCG: 1000 INJECTION, SOLUTION INTRAMUSCULAR; SUBCUTANEOUS at 11:16

## 2023-02-01 NOTE — PROGRESS NOTES
The ABCs of the Annual Wellness Visit  Subsequent Medicare Wellness Visit    Subjective    Michael Bridges is a 68 y.o. male who presents for a Subsequent Medicare Wellness Visit.    Needs prevnar 20  Needs Colon cancer screening    The following portions of the patient's history were reviewed and   updated as appropriate: allergies, current medications, past family history, past medical history, past social history, past surgical history and problem list.    Compared to one year ago, the [id number] feels his physical   health is worse.    Compared to one year ago, the patient feels his mental   health is the same.    Recent Hospitalizations:  He was not admitted to the hospital during the last year.       Current Medical Providers:  Patient Care Team:  Diana Priest MD as PCP - General (Family Medicine)  Breezy Noel II, MD as Consulting Physician (Pain Medicine)  Cheryl Marin MD as Consulting Physician (Cardiology)  Laura Noguera PA-C as Physician Assistant (Urology)    Outpatient Medications Prior to Visit   Medication Sig Dispense Refill   • cyclobenzaprine (FLEXERIL) 10 MG tablet Take 1 tablet by mouth 3 (Three) Times a Day As Needed for muscle spasms. 90 tablet 2   • diphenhydrAMINE (BENADRYL) 25 mg capsule Take 25 mg by mouth Every 6 (Six) Hours As Needed for Itching (RHINITIS).     • divalproex (DEPAKOTE) 250 MG DR tablet 1 po bid (Patient taking differently: Take 250 mg by mouth 2 (Two) Times a Day. 1 po bid) 60 tablet 2   • enalapril (VASOTEC) 10 MG tablet   5   • flunisolide (NASALIDE) 25 MCG/ACT (0.025%) solution nasal spray Inhale 2 sprays Daily. 1 bottle 5   • gabapentin (NEURONTIN) 600 MG tablet Take 1 tablet by mouth 3 (Three) Times a Day. 90 tablet 0   • ipratropium-albuterol (Combivent Respimat)  MCG/ACT inhaler Inhale 1 puff 4 (Four) Times a Day. 4 g 11   • mirtazapine (REMERON) 30 MG tablet Take 1 tablet by mouth Every Night. 30 tablet 2   • naloxone (NARCAN) 4  MG/0.1ML nasal spray 1 spray into each nostril As Needed (suspected overdose). May repeat with 2nd device in opposite nostril if minimal response in 2-3 minutes 2 each 2   • omeprazole (priLOSEC) 40 MG capsule TAKE 1 CAPSULE BY MOUTH EVERY DAY 90 capsule 1   • oxyCODONE (ROXICODONE) 10 MG tablet      • polyethylene glycol (MIRALAX) 17 g packet Take 17 g by mouth Daily. 30 packet 11   • vitamin D3 125 MCG (5000 UT) capsule capsule Take 1 capsule by mouth Daily. 30 capsule 11   • simvastatin (ZOCOR) 20 MG tablet Take 1 tablet by mouth Every Night. 30 tablet 11   • omeprazole (priLOSEC) 40 MG capsule Take 40 mg by mouth Daily.       Facility-Administered Medications Prior to Visit   Medication Dose Route Frequency Provider Last Rate Last Admin   • cyanocobalamin injection 1,000 mcg  1,000 mcg Intramuscular Weekly Diana Priest MD   1,000 mcg at 02/01/23 1116       Opioid medication/s are on active medication list.  and I have evaluated his active treatment plan and pain score trends (see table).  Vitals:    02/01/23 1038   PainSc:   8     I have reviewed the chart for potential of high risk medication and harmful drug interactions in the elderly.            Aspirin is not on active medication list.  Aspirin use is indicated based on review of current medical condition/s. Pros and cons of this therapy have been discussed with this patient. Benefits of this medication outweigh potential harm.  Patient has been instructed to start taking this medication..    Patient Active Problem List   Diagnosis   • Atopic rhinitis   • Osteoarthritis of cervical spine   • Chronic pain syndrome   • Well controlled diabetes mellitus (HCC)   • Chronic coronary artery disease   • Degeneration of intervertebral disc of cervical region   • Gastroesophageal reflux disease   • Abnormal gait   • Hyperlipidemia   • Insomnia   • Degeneration of intervertebral disc of lumbar region   • Lumbar radiculopathy   • Spinal stenosis of lumbar region  "  • Neuropathic pain syndrome (non-herpetic)   • Peripheral neuropathy   • Pulmonary emphysema (HCC)   • Restless legs syndrome   • Spinal stenosis of cervical region   • Tobacco dependence syndrome   • Tremor   • Cobalamin deficiency   • Vitamin D deficiency   • Constipation   • Muscle spasm   • Nodule of right lung   • Chronic daily headache   • BPH (benign prostatic hypertrophy)   • Congenital duplication of renal collecting system   • Chronic back pain   • Colonoscopy refused   • Chronic prescription opiate use   • Diabetic polyneuropathy associated with type 2 diabetes mellitus (HCC)   • Prostate cancer (HCC)   • Tobacco use disorder     Advance Care Planning  Advance Directive is not on file.  ACP discussion was held with the patient during this visit. Patient does not have an advance directive, information provided.     Objective    Vitals:    02/01/23 1038   BP: 118/72   Pulse: 77   Temp: 98.5 °F (36.9 °C)   SpO2: 95%   Weight: 74.4 kg (164 lb)   Height: 180.3 cm (71\")   PainSc:   8     Estimated body mass index is 22.87 kg/m² as calculated from the following:    Height as of this encounter: 180.3 cm (71\").    Weight as of this encounter: 74.4 kg (164 lb).    BMI is within normal parameters. No other follow-up for BMI required.      Does the patient have evidence of cognitive impairment? No    Lab Results   Component Value Date    CHLPL 182 02/01/2023    TRIG 90 02/01/2023    HDL 61 (H) 02/01/2023     (H) 02/01/2023    VLDL 16 02/01/2023    HGBA1C 5.60 02/01/2023        HEALTH RISK ASSESSMENT    Smoking Status:  Social History     Tobacco Use   Smoking Status Every Day   • Packs/day: 1.00   • Years: 57.00   • Pack years: 57.00   • Types: Cigarettes   Smokeless Tobacco Former   • Types: Chew, Snuff   Tobacco Comments    has smoked up to 2-3 ppd intermittently     Alcohol Consumption:  Social History     Substance and Sexual Activity   Alcohol Use No     Fall Risk Screen:    STEADI Fall Risk Assessment " was completed, and patient is at MODERATE risk for falls. Assessment completed on:2/1/2023    Depression Screening:  PHQ-2/PHQ-9 Depression Screening 2/1/2023   Feeling Down, Depressed or Hopeless 0-->not at all   PHQ-9: Brief Depression Severity Measure Score 0       Health Habits and Functional and Cognitive Screening:  Functional & Cognitive Status 2/1/2023   Do you have difficulty preparing food and eating? No   Do you have difficulty bathing yourself, getting dressed or grooming yourself? No   Do you have difficulty using the toilet? No   Do you have difficulty moving around from place to place? Yes   Do you have trouble with steps or getting out of a bed or a chair? Yes   Current Diet Well Balanced Diet   Dental Exam Not up to date   Eye Exam Up to date   Exercise (times per week) 7 times per week   Current Exercises Include Walking        Exercise Comment -   Current Exercise Activities Include -   Do you need help using the phone?  No   Are you deaf or do you have serious difficulty hearing?  No   Do you need help with transportation? No   Do you need help shopping? No   Do you need help preparing meals?  No   Do you need help with housework?  No   Do you need help with laundry? No   Do you need help taking your medications? No   Do you need help managing money? No   Do you ever drive or ride in a car without wearing a seat belt? No   Have you felt unusual stress, anger or loneliness in the last month? No   Who do you live with? Spouse   If you need help, do you have trouble finding someone available to you? No   Have you been bothered in the last four weeks by sexual problems? -   Do you have difficulty concentrating, remembering or making decisions? No       Age-appropriate Screening Schedule:  Refer to the list below for future screening recommendations based on patient's age, sex and/or medical conditions. Orders for these recommended tests are listed in the plan section. The patient has been provided  with a written plan.    Health Maintenance   Topic Date Due   • DXA SCAN  Never done   • ZOSTER VACCINE (1 of 2) Never done   • DIABETIC FOOT EXAM  03/12/2020   • DIABETIC EYE EXAM  07/29/2023   • HEMOGLOBIN A1C  08/01/2023   • LIPID PANEL  02/01/2024   • URINE MICROALBUMIN  02/01/2024   • TDAP/TD VACCINES (3 - Td or Tdap) 01/17/2028   • INFLUENZA VACCINE  Completed                CMS Preventative Services Quick Reference  Risk Factors Identified During Encounter  Chronic Pain: Natural history and expected course discussed. Questions answered.  Depression/Dysphoria: recently stable, declines meds  Fall Risk-High or Moderate: Discussed Fall Prevention in the home and Information on Fall Prevention Shared in After Visit Summary  Immunizations Discussed/Encouraged: Tdap, Influenza, Pneumococcal 23, Prevnar 20 (Pneumococcal 20-valent conjugate), Shingrix and COVID19  Inactivity/Sedentary: Patient was advised to exercise at least 150 minutes a week per CDC recommendations.  Tobacco Use/Dependance Risk (use dotphrase .tobaccocessation for documentation)  The above risks/problems have been discussed with the patient.  Pertinent information has been shared with the patient in the After Visit Summary.  An After Visit Summary and PPPS were made available to the patient.    Follow Up:   Next Medicare Wellness visit to be scheduled in 1 year.       Additional E&M Note during same encounter follows:  Patient has multiple medical problems which are significant and separately identifiable that require additional work above and beyond the Medicare Wellness Visit.      Chief Complaint  Medicare Wellness-subsequent    Subjective        HPI  Michael Bridges is also being seen today for routine f/u on several chronic med problems.    Has well controlled diabetes mellitus. Complains of decreased sensation in both feet, denies open wounds or ulcers. Blood sugar occ over 200.     He has emphysema/COPD and is a chronic smoker.  " Smoking approximate 1/2 pack/day.     Chronic conditions include CAD, HLP, B12 def, vit D def, COPD/emphysema, PAD. On ASA, statin, enlapril. Denies new symptoms.    Due for B12 shot    Pt's previous HPI reviewed and updated as indicated.     Review of Systems   Constitutional: Positive for fatigue. Negative for fever.   HENT: Positive for congestion and postnasal drip. Negative for mouth sores, nosebleeds and trouble swallowing.    Eyes: Positive for visual disturbance (chronic stable).   Respiratory: Positive for cough. Negative for shortness of breath and wheezing.    Cardiovascular: Positive for leg swelling. Negative for chest pain and palpitations.   Gastrointestinal: Negative for abdominal pain, blood in stool, diarrhea, nausea and vomiting.   Endocrine: Positive for cold intolerance.   Genitourinary: Negative for difficulty urinating, dysuria, hematuria, scrotal swelling and testicular pain.   Musculoskeletal: Positive for arthralgias, back pain, gait problem, joint swelling, myalgias, neck pain and neck stiffness.   Skin: Negative for rash and wound.   Neurological: Positive for dizziness, tremors, weakness, numbness and confusion (occ mild). Negative for syncope.   Hematological: Negative for adenopathy. Does not bruise/bleed easily.   Psychiatric/Behavioral: Positive for sleep disturbance. Negative for dysphoric mood. The patient is nervous/anxious.    Pt's previous ROS reviewed and updated as indicated.       Objective   Vital Signs:  /72   Pulse 77   Temp 98.5 °F (36.9 °C)   Ht 180.3 cm (71\")   Wt 74.4 kg (164 lb)   SpO2 95%   BMI 22.87 kg/m²     Physical Exam  Vitals and nursing note reviewed.   Constitutional:       General: He is not in acute distress.     Appearance: He is well-developed, well-groomed and normal weight. He is not ill-appearing.      Comments: Appears older than stated age.   HENT:      Head: Atraumatic.      Mouth/Throat:      Mouth: Mucous membranes are moist.   Eyes: "      General: No scleral icterus.     Conjunctiva/sclera: Conjunctivae normal.   Neck:      Thyroid: No thyroid mass.      Vascular: Normal carotid pulses. No carotid bruit.   Cardiovascular:      Rate and Rhythm: Normal rate and regular rhythm.      Pulses:           Dorsalis pedis pulses are 1+ on the right side and 1+ on the left side.        Posterior tibial pulses are 1+ on the right side and 1+ on the left side.      Heart sounds: Heart sounds are distant.   Pulmonary:      Effort: Pulmonary effort is normal.      Breath sounds: Decreased breath sounds present. No wheezing, rhonchi or rales.   Musculoskeletal:      Right lower leg: No edema.      Left lower leg: No edema.   Lymphadenopathy:      Cervical: No cervical adenopathy.   Skin:     General: Skin is warm and dry.      Coloration: Skin is not cyanotic, jaundiced or pale.      Findings: No bruising or rash.   Neurological:      Mental Status: He is alert and oriented to person, place, and time. Mental status is at baseline.      Motor: Weakness (lower extremities, appears at baseline) present.      Gait: Gait abnormal (antalgic, requires assistance, using single prong cane).   Psychiatric:         Mood and Affect: Mood and affect normal.         Behavior: Behavior normal. Behavior is cooperative.         Cognition and Memory: Cognition normal.      Pt's previous physical exam reviewed and updated as indicated.                       Assessment and Plan   Diagnoses and all orders for this visit:    1. Medicare annual wellness visit, subsequent (Primary)    2. Mixed hyperlipidemia  -     simvastatin (ZOCOR) 20 MG tablet; Take 1 tablet by mouth Every Night.  Dispense: 30 tablet; Refill: 11  -     Comprehensive Metabolic Panel  -     Lipid Panel  -     TSH Rfx On Abnormal To Free T4    3. Well controlled diabetes mellitus (HCC)  -     Hemoglobin A1c  -     Microalbumin / Creatinine Urine Ratio - Urine, Clean Catch  -     Comprehensive Metabolic Panel  -      TSH Rfx On Abnormal To Free T4    4. Other emphysema (HCC)    5. Chronic coronary artery disease  -     TSH Rfx On Abnormal To Free T4    6. Cobalamin deficiency    7. Prostate cancer (HCC)    8. Need for pneumococcal vaccination  -     Pneumococcal Conjugate Vaccine 20-Valent (PCV20)    9. Screen for colon cancer  -     Cologuard - Stool, Per Rectum; Future    10. Tobacco use disorder    11. Diabetic polyneuropathy associated with type 2 diabetes mellitus (HCC)    12. Chronic daily headache    13. Restless legs syndrome    14. Chronic pain syndrome    15. Chronic prescription opiate use    16. Colonoscopy refused    17. Vitamin D deficiency    18. Vitamin B12 deficiency    19. Gastroesophageal reflux disease, unspecified whether esophagitis present      Risks/Benefits of colon cancer screening options reviewed. Pt voiced understanding and wishes to proceed with Cologuard.    Chronic conditions appear generally stable. Routine surveillance labs as above. Adjust as indicated.    Continue B12 injections.    Tobacco cessation advised.  Pt voiced understanding of health risks of cont'd tobacco use.    Continue zocor for HLP. Encouraged heart healthy eating.  Continue enalapril for HTN  Continue depakote for chronic headaches, no new focal neuro symptoms.         Follow Up   Return in about 6 months (around 8/1/2023).  Patient was given instructions and counseling regarding his condition or for health maintenance advice. Please see specific information pulled into the AVS if appropriate.

## 2023-02-01 NOTE — PATIENT INSTRUCTIONS
Medicare Wellness  Personal Prevention Plan of Service     Date of Office Visit:    Encounter Provider:  Diana Priest MD  Place of Service:  [facility]  Patient Name: Michael Bridges  :  1955    As part of the Medicare Wellness portion of your visit today, we are providing you with this personalized preventive plan of services (PPPS). This plan is based upon recommendations of the [place] Preventive Services Task Force (USPSTF) and the Advisory Committee on Immunization Practices (ACIP).    This lists the preventive care services that should be considered, and provides dates of when you are due. Items listed as completed are up-to-date and do not require any further intervention.    Health Maintenance   Topic Date Due    DXA SCAN  Never done    ZOSTER VACCINE (1 of 2) Never done    COLORECTAL CANCER SCREENING  2010    DIABETIC FOOT EXAM  2020    COVID-19 Vaccine (3 - Booster for Moderna series) 2021    ANNUAL WELLNESS VISIT  2023    HEMOGLOBIN A1C  2023    URINE MICROALBUMIN  2023    Pneumococcal Vaccine 65+ (3 - PPSV23 if available, else PCV20) 2023    LIPID PANEL  2023    DIABETIC EYE EXAM  2023    LUNG CANCER SCREENING  2023    TDAP/TD VACCINES (3 - Td or Tdap) 2028    HEPATITIS C SCREENING  Completed    INFLUENZA VACCINE  Completed    AAA SCREEN (ONE-TIME)  Completed       Orders Placed This Encounter   Procedures    Pneumococcal Conjugate Vaccine 20-Valent (PCV20)    Hemoglobin A1c     Order Specific Question:   Release to patient     Answer:   Routine Release    Microalbumin / Creatinine Urine Ratio - Urine, Clean Catch     Order Specific Question:   Release to patient     Answer:   Routine Release    Comprehensive Metabolic Panel     Order Specific Question:   Release to patient     Answer:   Routine Release    Lipid Panel    TSH Rfx On Abnormal To Free T4     Order Specific  Question:   Release to patient     Answer:   Routine Release    Cologuard - Stool, Per Rectum     Standing Status:   Future     Standing Expiration Date:   2/1/2024     Order Specific Question:   Release to patient     Answer:   Routine Release       Return in about 6 months (around 8/1/2023).

## 2023-02-02 LAB
ALBUMIN SERPL-MCNC: 4.4 G/DL (ref 3.5–5.2)
ALBUMIN/CREAT UR: <5 MG/G CREAT (ref 0–29)
ALBUMIN/GLOB SERPL: 2.3 G/DL
ALP SERPL-CCNC: 62 U/L (ref 39–117)
ALT SERPL-CCNC: 21 U/L (ref 1–41)
AST SERPL-CCNC: 31 U/L (ref 1–40)
BILIRUB SERPL-MCNC: 0.3 MG/DL (ref 0–1.2)
BUN SERPL-MCNC: 11 MG/DL (ref 8–23)
BUN/CREAT SERPL: 11 (ref 7–25)
CALCIUM SERPL-MCNC: 9.1 MG/DL (ref 8.6–10.5)
CHLORIDE SERPL-SCNC: 100 MMOL/L (ref 98–107)
CHOLEST SERPL-MCNC: 182 MG/DL (ref 0–200)
CO2 SERPL-SCNC: 25.5 MMOL/L (ref 22–29)
CREAT SERPL-MCNC: 1 MG/DL (ref 0.76–1.27)
CREAT UR-MCNC: 55.8 MG/DL
EGFRCR SERPLBLD CKD-EPI 2021: 82.5 ML/MIN/1.73
GLOBULIN SER CALC-MCNC: 1.9 GM/DL
GLUCOSE SERPL-MCNC: 113 MG/DL (ref 65–99)
HBA1C MFR BLD: 5.6 % (ref 4.8–5.6)
HDLC SERPL-MCNC: 61 MG/DL (ref 40–60)
LDLC SERPL CALC-MCNC: 105 MG/DL (ref 0–100)
MICROALBUMIN UR-MCNC: <3 UG/ML
POTASSIUM SERPL-SCNC: 4.7 MMOL/L (ref 3.5–5.2)
PROT SERPL-MCNC: 6.3 G/DL (ref 6–8.5)
SODIUM SERPL-SCNC: 137 MMOL/L (ref 136–145)
TRIGL SERPL-MCNC: 90 MG/DL (ref 0–150)
TSH SERPL DL<=0.005 MIU/L-ACNC: 1.42 UIU/ML (ref 0.27–4.2)
VLDLC SERPL CALC-MCNC: 16 MG/DL (ref 5–40)

## 2023-02-08 ENCOUNTER — HOSPITAL ENCOUNTER (OUTPATIENT)
Dept: MRI IMAGING | Facility: HOSPITAL | Age: 68
Discharge: HOME | End: 2023-02-08
Admitting: UROLOGY
Payer: MEDICARE

## 2023-02-08 DIAGNOSIS — C61 PROSTATE CANCER: ICD-10-CM

## 2023-02-08 PROCEDURE — 72195 MRI PELVIS W/O DYE: CPT

## 2023-02-10 ENCOUNTER — TELEPHONE (OUTPATIENT)
Dept: UROLOGY | Facility: CLINIC | Age: 68
End: 2023-02-10

## 2023-02-10 NOTE — TELEPHONE ENCOUNTER
Provider: DR OLMOS  Caller: Anabelle Bridges  Relationship to Patient: SPOUSE    Phone Number: [phone]  Reason for Call: PT HAD ORDER FOR A MRI AND THAT WAS DONE ON 2-8-23. PT'S WIFE CALLING TO SEE WHEN THOSE RESULTS WOULD BE READY.    PT IS WANTING TO HAVE PROSTATE SURGERY SOONER THAN LATER.     When was the patient last seen: 12-21-22

## 2023-02-10 NOTE — TELEPHONE ENCOUNTER
Fay, he was supposed to have his MRI in May, looks like he had it done early.  Make a telephone or office visit to discuss the findings and whether or not we repeat a biopsy early.  He is currently on active surveillance.

## 2023-02-16 ENCOUNTER — OFFICE VISIT (OUTPATIENT)
Dept: UROLOGY | Facility: CLINIC | Age: 68
End: 2023-02-16
Payer: MEDICARE

## 2023-02-16 DIAGNOSIS — C61 PROSTATE CANCER: Primary | ICD-10-CM

## 2023-02-16 PROCEDURE — 99443 PR PHYS/QHP TELEPHONE EVALUATION 21-30 MIN: CPT | Performed by: UROLOGY

## 2023-02-16 RX ORDER — CEPHALEXIN 500 MG/1
500 CAPSULE ORAL 2 TIMES DAILY
Qty: 6 CAPSULE | Refills: 0 | Status: SHIPPED | OUTPATIENT
Start: 2023-02-16 | End: 2023-02-19

## 2023-02-16 RX ORDER — CIPROFLOXACIN 500 MG/1
500 TABLET, FILM COATED ORAL 2 TIMES DAILY
Qty: 6 TABLET | Refills: 0 | Status: SHIPPED | OUTPATIENT
Start: 2023-02-16 | End: 2023-03-15

## 2023-02-16 RX ORDER — MAGNESIUM HYDROXIDE 1200 MG/15ML
1 LIQUID ORAL ONCE
Qty: 1 ENEMA | Refills: 0 | Status: SHIPPED | OUTPATIENT
Start: 2023-02-16 | End: 2023-02-16

## 2023-02-16 NOTE — PROGRESS NOTES
21-minute telephone encounter    I had a long conversation with the patient about his MRI.    MRI Prostate Without Contrast  Result Date: 2/8/2023  Impression: 1. Right posterolateral apex peripheral zone 9 x 6 mm lesion, corresponding to an assessment category of PIRADS 4 - High (clinically significant cancer is likely to be present). 2. No findings of EPE. 3. No lymphadenopathy in the pelvis. Overall PI-RADS 4 Exam. Electronically Signed: Chidi Mouser  2/8/2023 12:29 PM EST  Workstation ID: [id number]    The areas of interest correspond with his prior positive [place] 6 biopsy results.  With that said, the patient is very anxious, he once again tells me that he wants to have a prostatectomy.  We then talked about what that involves, typically a 4 to 6-hour surgery, with surgical risks of cardiopulmonary compromise, especially given his COPD and diabetes, bleeding, infection, lymphocele, urine leak.  We also discussed expected side effects after prostatectomy including erectile dysfunction and stress incontinence.  After this was once again discussed, the patient then recanted and said he wanted to have a prostatectomy only if there were signs of his disease getting worse.    We therefore mutually agreed to get a repeat PSA and repeat a prostate biopsy in 6 months.  We discussed the risks and benefits again of biopsy.  Informed consent was obtained.  I sent in medications and the enema.    You have chosen to receive care through a telephone visit. Do you consent to use a telephone visit for your medical care today? Yes

## 2023-03-15 ENCOUNTER — OFFICE VISIT (OUTPATIENT)
Dept: FAMILY MEDICINE CLINIC | Facility: CLINIC | Age: 68
End: 2023-03-15
Payer: MEDICARE

## 2023-03-15 VITALS
TEMPERATURE: 97.9 F | BODY MASS INDEX: 23.38 KG/M2 | OXYGEN SATURATION: 95 % | WEIGHT: 167 LBS | HEART RATE: 75 BPM | HEIGHT: 71 IN | SYSTOLIC BLOOD PRESSURE: 120 MMHG | DIASTOLIC BLOOD PRESSURE: 70 MMHG

## 2023-03-15 DIAGNOSIS — E11.9 WELL CONTROLLED DIABETES MELLITUS: ICD-10-CM

## 2023-03-15 DIAGNOSIS — G89.29 ACUTE EXACERBATION OF CHRONIC LOW BACK PAIN: Primary | ICD-10-CM

## 2023-03-15 DIAGNOSIS — M54.50 ACUTE EXACERBATION OF CHRONIC LOW BACK PAIN: Primary | ICD-10-CM

## 2023-03-15 DIAGNOSIS — B35.3 TINEA PEDIS OF BOTH FEET: ICD-10-CM

## 2023-03-15 PROCEDURE — 1160F RVW MEDS BY RX/DR IN RCRD: CPT | Performed by: FAMILY MEDICINE

## 2023-03-15 PROCEDURE — 1159F MED LIST DOCD IN RCRD: CPT | Performed by: FAMILY MEDICINE

## 2023-03-15 PROCEDURE — 99214 OFFICE O/P EST MOD 30 MIN: CPT | Performed by: FAMILY MEDICINE

## 2023-03-15 PROCEDURE — 3044F HG A1C LEVEL LT 7.0%: CPT | Performed by: FAMILY MEDICINE

## 2023-03-15 PROCEDURE — 96372 THER/PROPH/DIAG INJ SC/IM: CPT | Performed by: FAMILY MEDICINE

## 2023-03-15 RX ORDER — KETOROLAC TROMETHAMINE 30 MG/ML
30 INJECTION, SOLUTION INTRAMUSCULAR; INTRAVENOUS ONCE
Status: COMPLETED | OUTPATIENT
Start: 2023-03-15 | End: 2023-03-15

## 2023-03-15 RX ORDER — METHYLPREDNISOLONE ACETATE 40 MG/ML
40 INJECTION, SUSPENSION INTRA-ARTICULAR; INTRALESIONAL; INTRAMUSCULAR; SOFT TISSUE ONCE
Status: COMPLETED | OUTPATIENT
Start: 2023-03-15 | End: 2023-03-15

## 2023-03-15 RX ORDER — TERBINAFINE HYDROCHLORIDE 250 MG/1
250 TABLET ORAL DAILY
Qty: 14 TABLET | Refills: 0 | Status: SHIPPED | OUTPATIENT
Start: 2023-03-15 | End: 2023-03-29

## 2023-03-15 RX ADMIN — METHYLPREDNISOLONE ACETATE 40 MG: 40 INJECTION, SUSPENSION INTRA-ARTICULAR; INTRALESIONAL; INTRAMUSCULAR; SOFT TISSUE at 16:10

## 2023-03-15 RX ADMIN — KETOROLAC TROMETHAMINE 30 MG: 30 INJECTION, SOLUTION INTRAMUSCULAR; INTRAVENOUS at 16:09

## 2023-03-15 NOTE — PROGRESS NOTES
Subjective   Michael Bridges is a 68 y.o. male.     History of Present Illness   Here requesting a medical exam to qualify for diabetic shoes.  Does complain itchy rash on his feet, thickened toenails particularly on the right foot.  Denies open wounds.    Has known severe lumbar DDD/DJD.  Having increased low back pain with radiation into the right leg.  Follow-up Dr. Dill.  No fall no known injury.      The following portions of the patient's history were reviewed and updated as appropriate: allergies, current medications, past family history, past medical history, past social history, past surgical history and problem list.    Review of Systems   Constitutional: Positive for fatigue. Negative for appetite change, fever and unexpected weight change.   HENT: Positive for congestion and postnasal drip. Negative for mouth sores, nosebleeds, rhinorrhea, sore throat and trouble swallowing.    Eyes: Negative for visual disturbance.   Respiratory: Positive for cough (dry, intermittent) and shortness of breath (mild with exertion). Negative for wheezing.    Cardiovascular: Positive for leg swelling (mild, stable). Negative for chest pain and palpitations.   Gastrointestinal: Negative for abdominal pain, constipation, nausea and vomiting.   Endocrine: Positive for cold intolerance. Negative for polydipsia and polyuria.   Genitourinary: Negative for decreased urine volume, dysuria and hematuria.   Musculoskeletal: Positive for arthralgias, back pain, gait problem, myalgias, neck pain and neck stiffness.   Skin: Negative for rash and wound.   Neurological: Positive for dizziness, tremors, weakness, numbness and headaches. Negative for syncope.   Hematological: Negative for adenopathy. Does not bruise/bleed easily.   Psychiatric/Behavioral: Positive for confusion (mild, intermittent) and sleep disturbance. Negative for dysphoric mood. The patient is nervous/anxious.    Pt's previous ROS reviewed and updated as  indicated.       Objective    Vitals:    03/15/23 1521   BP: 120/70   Pulse: 75   Temp: 97.9 °F (36.6 °C)   SpO2: 95%     Body mass index is 23.29 kg/m².      03/15/23  1521   Weight: 75.8 kg (167 lb)       Physical Exam  Vitals and nursing note reviewed.   Constitutional:       General: He is not in acute distress.     Appearance: He is well-developed, well-groomed and normal weight. He is not ill-appearing.      Comments: Appears older than stated age.   HENT:      Head: Atraumatic.   Eyes:      General: No scleral icterus.     Conjunctiva/sclera: Conjunctivae normal.   Cardiovascular:      Rate and Rhythm: Normal rate and regular rhythm.      Pulses:           Dorsalis pedis pulses are 1+ on the right side and 1+ on the left side.        Posterior tibial pulses are 1+ on the right side and 1+ on the left side.      Heart sounds: Heart sounds are distant.   Pulmonary:      Effort: Pulmonary effort is normal.      Breath sounds: Decreased breath sounds present. No wheezing, rhonchi or rales.   Musculoskeletal:      Right lower leg: No edema.      Left lower leg: No edema.      Right foot: Deformity present.      Left foot: Deformity present.   Feet:      Right foot:      Skin integrity: Erythema and callus present. No ulcer, blister or fissure.      Toenail Condition: Right toenails are abnormally thick. Fungal disease present.     Left foot:      Skin integrity: Erythema and callus present. No ulcer, blister or fissure.      Toenail Condition: Left toenails are abnormally thick. Fungal disease present.     Comments: Tinea pedis. Diffuse decreased monofilament testing bilaterally  Skin:     General: Skin is warm and dry.      Coloration: Skin is not cyanotic, jaundiced or pale.      Findings: No bruising or rash.   Neurological:      Mental Status: He is alert and oriented to person, place, and time. Mental status is at baseline.      Motor: Weakness (lower extremities, appears at baseline) present.      Gait: Gait  abnormal (antalgic, requires assistance, using single prong cane).   Psychiatric:         Mood and Affect: Mood and affect normal.         Behavior: Behavior normal. Behavior is cooperative.         Cognition and Memory: Cognition normal.     Pt's previous physical exam reviewed and updated as indicated.    Lab Results   Component Value Date    WBC 8.23 11/22/2022    HGB 15.4 11/22/2022    HCT 46.6 11/22/2022    MCV 90.8 11/22/2022     11/22/2022       Lab Results   Component Value Date    GLUCOSE 113 (H) 02/01/2023    BUN 11 02/01/2023    CREATININE 1.00 02/01/2023    EGFRIFNONA 84 01/12/2022    EGFRIFAFRI 102 01/12/2022    BCR 11.0 02/01/2023    K 4.7 02/01/2023    CO2 25.5 02/01/2023    CALCIUM 9.1 02/01/2023    PROTENTOTREF 6.3 02/01/2023    ALBUMIN 4.4 02/01/2023    LABIL2 2.3 02/01/2023    AST 31 02/01/2023    ALT 21 02/01/2023       Lab Results   Component Value Date    TSH 1.420 02/01/2023     Lab Results   Component Value Date    HGBA1C 5.60 02/01/2023    HGBA1C 5.60 07/12/2022    HGBA1C 5.70 (H) 01/12/2022     Lab Results   Component Value Date    MICROALBUR <3.0 02/01/2023    CREATININE 1.00 02/01/2023       Assessment & Plan   Diagnoses and all orders for this visit:    1. Acute exacerbation of chronic low back pain (Primary)  -     ketorolac (TORADOL) injection 30 mg  -     methylPREDNISolone acetate (DEPO-medrol) injection 40 mg    2. Tinea pedis of both feet  -     terbinafine (lamiSIL) 250 MG tablet; Take 1 tablet by mouth Daily for 14 days.  Dispense: 14 tablet; Refill: 0    3. Well controlled diabetes mellitus (HCC)  -     Footwear Diabetic       Encouraged to f/u with Dr. Dill for further eval of increased back pain.    I have reviewed risks/benefits and potential side effects of various treatment options for tinea pedis. Pt voices understanding and wishes to proceed with terbinafine as above.    Routine f/u as scheduled, f/u sooner as needed.  Patient was encouraged to keep me informed of  any acute changes, lack of improvement, or any new concerning symptoms.  Pt is aware of reasons to seek emergent care.  Patient voiced understanding of all instructions and denied further questions.    Please note that portions of this note may have been completed with a voice recognition program.

## 2023-03-20 ENCOUNTER — TELEPHONE (OUTPATIENT)
Dept: FAMILY MEDICINE CLINIC | Facility: CLINIC | Age: 68
End: 2023-03-20
Payer: MEDICARE

## 2023-03-20 NOTE — TELEPHONE ENCOUNTER
I confirmed with [facility] that there is no form, but they documentation of a foot exam.  I didn't see mention of an exam in his note.  Please advise.

## 2023-03-30 DIAGNOSIS — J43.8 OTHER EMPHYSEMA: ICD-10-CM

## 2023-03-30 RX ORDER — IPRATROPIUM/ALBUTEROL SULFATE 20-100 MCG
MIST INHALER (GRAM) INHALATION
Qty: 4 G | Refills: 10 | Status: SHIPPED | OUTPATIENT
Start: 2023-03-30

## 2023-03-31 NOTE — TELEPHONE ENCOUNTER
Caller: Anabelle Bridges    Relationship: Emergency Contact    Best call back number: [phone]    Requested Prescriptions:   Requested Prescriptions     Pending Prescriptions Disp Refills   • omeprazole (priLOSEC) 40 MG capsule [Pharmacy Med Name: OMEPRAZOLE 40MG CAPSULES] 90 capsule 1     Sig: TAKE 1 CAPSULE BY MOUTH EVERY DAY        Pharm[facility] where request should be sent[id number] - [place], [place] - [address] - [phone] [facility] [phone] FX     Last office visit with prescribing clinician: 3/15/2023   Last telemedicine visit with prescribing clinician: 8/1/2023   Next office visit with prescribing clinician: 8/1/2023     Additional details provided by patient:HAS 3 OR 4 DAYS REMAINING   Does the patient have less than a 3 day supply:  [] Yes  [x] No    Would you like a call back once the refill request has been completed: [x] Yes [] No    If the office needs to give you a call back, can they leave a voicemail: [x] Yes [] No    Duke Murdock Rep   03/31/23 15:15 EDT

## 2023-04-03 RX ORDER — OMEPRAZOLE 40 MG/1
CAPSULE, DELAYED RELEASE ORAL
Qty: 90 CAPSULE | Refills: 1 | Status: SHIPPED | OUTPATIENT
Start: 2023-04-03

## 2023-05-04 ENCOUNTER — TELEPHONE (OUTPATIENT)
Dept: FAMILY MEDICINE CLINIC | Facility: CLINIC | Age: 68
End: 2023-05-04

## 2023-05-04 NOTE — TELEPHONE ENCOUNTER
PHONE CALL FROM WIFE.  DR ORLANDO ASKED IF PATIENT HAS HAD CT SCAN OF LUNGS.      PLEASE CALL [phone]

## 2023-07-18 NOTE — TELEPHONE ENCOUNTER
AUDIOLOGICAL EVALUATION      REASON FOR TESTING: Audiometric evaluation per the request of Dr. Fadi Be, due to the diagnosis of eustachian tube dysfunction (bilateral). Jennifer Vela was accompanied to today's appointment by her mother. Her mother explained that Jennifer Vela received bilateral PE tubes on 5/8/23. She had drainage from the left ear last week, which resolved with ear drops. She experiences pain in both ears when swimming. Jennifer Vela passed the [facility] at birth. She was born at 31 weeks and spent time in the NICU due to prematurity and respiratory distress. The patient's twin sister has fluctuating hearing loss related to middle ear dysfunction. OTOSCOPY: PE tube visualize for both ears. AUDIOGRAM    \  Reliability: Good    DISTORTION PRODUCT OTOACOUSTIC EMISSIONS SCREENING    Right Ear     [] Passed     []    Refer     [x] Did Not Test  Left Ear        [] Passed    []    Refer     [x] Did Not Test      COMMENTS:  Borderline normal hearing at 250Hz for both ears, rising to normal hearing sensitivity at 4000Hz and then sloping to borderline normal hearing at 8000Hz. Word recognition ability is excellent at 100% for both ears. Tympanometry revealed a flat tympanogram, with a large ear canal volume, suggesting patent PE tubes for both ears. RECOMMENDATION(S):   1- Continue care with [address] [place] [address], as scheduled. 2- Repeat audiogram and tympanogram in six months to monitor borderline normal responses. Testing should be completed sooner if any changes are noted in hearing ability. This was done 11/26 as response to other messaging.

## 2023-08-01 ENCOUNTER — OFFICE VISIT (OUTPATIENT)
Dept: FAMILY MEDICINE CLINIC | Facility: CLINIC | Age: 68
End: 2023-08-01
Payer: MEDICARE

## 2023-08-01 VITALS
BODY MASS INDEX: 21.95 KG/M2 | HEIGHT: 71 IN | TEMPERATURE: 97.1 F | OXYGEN SATURATION: 97 % | HEART RATE: 56 BPM | DIASTOLIC BLOOD PRESSURE: 64 MMHG | WEIGHT: 156.8 LBS | SYSTOLIC BLOOD PRESSURE: 116 MMHG

## 2023-08-01 DIAGNOSIS — J43.8 OTHER EMPHYSEMA: ICD-10-CM

## 2023-08-01 DIAGNOSIS — F17.200 TOBACCO DEPENDENCE SYNDROME: ICD-10-CM

## 2023-08-01 DIAGNOSIS — F17.200 TOBACCO USE DISORDER: ICD-10-CM

## 2023-08-01 DIAGNOSIS — E11.9 WELL CONTROLLED DIABETES MELLITUS: ICD-10-CM

## 2023-08-01 DIAGNOSIS — E55.9 VITAMIN D DEFICIENCY: ICD-10-CM

## 2023-08-01 DIAGNOSIS — E78.2 MIXED HYPERLIPIDEMIA: ICD-10-CM

## 2023-08-01 DIAGNOSIS — R63.4 UNINTENTIONAL WEIGHT LOSS: Primary | ICD-10-CM

## 2023-08-01 DIAGNOSIS — E53.8 VITAMIN B12 DEFICIENCY: ICD-10-CM

## 2023-08-01 DIAGNOSIS — Z12.11 COLON CANCER SCREENING: ICD-10-CM

## 2023-08-01 PROCEDURE — 96372 THER/PROPH/DIAG INJ SC/IM: CPT | Performed by: FAMILY MEDICINE

## 2023-08-01 PROCEDURE — 1160F RVW MEDS BY RX/DR IN RCRD: CPT | Performed by: FAMILY MEDICINE

## 2023-08-01 PROCEDURE — 99214 OFFICE O/P EST MOD 30 MIN: CPT | Performed by: FAMILY MEDICINE

## 2023-08-01 PROCEDURE — 1159F MED LIST DOCD IN RCRD: CPT | Performed by: FAMILY MEDICINE

## 2023-08-01 PROCEDURE — 3044F HG A1C LEVEL LT 7.0%: CPT | Performed by: FAMILY MEDICINE

## 2023-08-01 RX ORDER — SIMVASTATIN 40 MG
TABLET ORAL
COMMUNITY
Start: 2023-05-10

## 2023-08-01 RX ORDER — ASPIRIN 81 MG/1
81 TABLET ORAL DAILY
Qty: 90 TABLET | Refills: 11
Start: 2023-08-01

## 2023-08-01 RX ADMIN — CYANOCOBALAMIN 1000 MCG: 1000 INJECTION, SOLUTION INTRAMUSCULAR; SUBCUTANEOUS at 09:53

## 2023-08-02 LAB
25(OH)D3+25(OH)D2 SERPL-MCNC: 86.1 NG/ML (ref 30–100)
ALBUMIN SERPL-MCNC: 3.8 G/DL (ref 3.5–5.2)
ALBUMIN/GLOB SERPL: 1.8 G/DL
ALP SERPL-CCNC: 59 U/L (ref 39–117)
ALT SERPL-CCNC: 15 U/L (ref 1–41)
AST SERPL-CCNC: 16 U/L (ref 1–40)
BASOPHILS # BLD AUTO: 0.06 10*3/MM3 (ref 0–0.2)
BASOPHILS NFR BLD AUTO: 1 % (ref 0–1.5)
BILIRUB SERPL-MCNC: 0.3 MG/DL (ref 0–1.2)
BUN SERPL-MCNC: 13 MG/DL (ref 8–23)
BUN/CREAT SERPL: 13.7 (ref 7–25)
CALCIUM SERPL-MCNC: 9.4 MG/DL (ref 8.6–10.5)
CHLORIDE SERPL-SCNC: 106 MMOL/L (ref 98–107)
CO2 SERPL-SCNC: 27.5 MMOL/L (ref 22–29)
CREAT SERPL-MCNC: 0.95 MG/DL (ref 0.76–1.27)
CRP SERPL-MCNC: <0.3 MG/DL (ref 0–0.5)
EGFRCR SERPLBLD CKD-EPI 2021: 87.2 ML/MIN/1.73
EOSINOPHIL # BLD AUTO: 0.15 10*3/MM3 (ref 0–0.4)
EOSINOPHIL NFR BLD AUTO: 2.5 % (ref 0.3–6.2)
ERYTHROCYTE [DISTWIDTH] IN BLOOD BY AUTOMATED COUNT: 13.6 % (ref 12.3–15.4)
GLOBULIN SER CALC-MCNC: 2.1 GM/DL
GLUCOSE SERPL-MCNC: 127 MG/DL (ref 65–99)
HBA1C MFR BLD: 5.7 % (ref 4.8–5.6)
HCT VFR BLD AUTO: 46.2 % (ref 37.5–51)
HGB BLD-MCNC: 15.7 G/DL (ref 13–17.7)
IMM GRANULOCYTES # BLD AUTO: 0.01 10*3/MM3 (ref 0–0.05)
IMM GRANULOCYTES NFR BLD AUTO: 0.2 % (ref 0–0.5)
LYMPHOCYTES # BLD AUTO: 2 10*3/MM3 (ref 0.7–3.1)
LYMPHOCYTES NFR BLD AUTO: 33.5 % (ref 19.6–45.3)
MCH RBC QN AUTO: 30.3 PG (ref 26.6–33)
MCHC RBC AUTO-ENTMCNC: 34 G/DL (ref 31.5–35.7)
MCV RBC AUTO: 89.2 FL (ref 79–97)
MONOCYTES # BLD AUTO: 0.61 10*3/MM3 (ref 0.1–0.9)
MONOCYTES NFR BLD AUTO: 10.2 % (ref 5–12)
NEUTROPHILS # BLD AUTO: 3.14 10*3/MM3 (ref 1.7–7)
NEUTROPHILS NFR BLD AUTO: 52.6 % (ref 42.7–76)
NRBC BLD AUTO-RTO: 0 /100 WBC (ref 0–0.2)
PLATELET # BLD AUTO: 265 10*3/MM3 (ref 140–450)
POTASSIUM SERPL-SCNC: 4.5 MMOL/L (ref 3.5–5.2)
PROT SERPL-MCNC: 5.9 G/DL (ref 6–8.5)
RBC # BLD AUTO: 5.18 10*6/MM3 (ref 4.14–5.8)
SODIUM SERPL-SCNC: 144 MMOL/L (ref 136–145)
TSH SERPL DL<=0.005 MIU/L-ACNC: 0.98 UIU/ML (ref 0.27–4.2)
VIT B12 SERPL-MCNC: 1239 PG/ML (ref 211–946)
WBC # BLD AUTO: 5.97 10*3/MM3 (ref 3.4–10.8)

## 2023-08-03 ENCOUNTER — TELEPHONE (OUTPATIENT)
Dept: FAMILY MEDICINE CLINIC | Facility: CLINIC | Age: 68
End: 2023-08-03
Payer: MEDICARE

## 2023-08-04 ENCOUNTER — TELEPHONE (OUTPATIENT)
Dept: FAMILY MEDICINE CLINIC | Facility: CLINIC | Age: 68
End: 2023-08-04
Payer: MEDICARE

## 2023-08-04 NOTE — TELEPHONE ENCOUNTER
Spoke with pts wife and she sts he doesn't want to see Dr Bowie.  Sts he has seen him before and doesn't like him.

## 2023-08-08 NOTE — TELEPHONE ENCOUNTER
Caller: Anabelle Bridges    Relationship to patient: Emergency Contact    Best call back number:     [phone] (Home)     Patient is needing:     CALLER/WIFE TRIED TO RETURN MISSED CALL FROM THE OFFICE REGARDING PATIENT

## 2023-08-09 NOTE — TELEPHONE ENCOUNTER
Attempted to call back to discuss. No answer/no VM. Unable to LM.     HUB - if patient or spouse return call, please transfer them to speak with me at the office and/or provide them with my direct extension number - [phone].

## 2023-08-10 NOTE — TELEPHONE ENCOUNTER
Spoke with patient spouse earlier today and provided her with the telephone number for Gen Surg to call to change appointment to a different provider. Per pt appt desk, he has been rescheduled for later this month with Dr Good.

## 2023-08-14 ENCOUNTER — TELEPHONE (OUTPATIENT)
Dept: UROLOGY | Facility: CLINIC | Age: 68
End: 2023-08-14
Payer: MEDICARE

## 2023-08-14 DIAGNOSIS — C61 PROSTATE CANCER: ICD-10-CM

## 2023-08-14 RX ORDER — CIPROFLOXACIN 500 MG/1
500 TABLET, FILM COATED ORAL 2 TIMES DAILY
Qty: 6 TABLET | Refills: 0 | Status: SHIPPED | OUTPATIENT
Start: 2023-08-14 | End: 2023-08-21

## 2023-08-14 RX ORDER — CEPHALEXIN 500 MG/1
500 CAPSULE ORAL 2 TIMES DAILY
Qty: 6 CAPSULE | Refills: 0 | Status: SHIPPED | OUTPATIENT
Start: 2023-08-14 | End: 2023-08-21

## 2023-08-14 RX ORDER — MAGNESIUM HYDROXIDE 1200 MG/15ML
1 LIQUID ORAL ONCE AS NEEDED
Qty: 1 ENEMA | Refills: 0 | Status: SHIPPED | OUTPATIENT
Start: 2023-08-14

## 2023-08-14 NOTE — TELEPHONE ENCOUNTER
Caller: Anabelle Bridges    Relationship: Emergency Contact    Best call back number: [phone]    What medications are you currently taking:   Current Outpatient Medications on File Prior to Visit   Medication Sig Dispense Refill    aspirin 81 MG EC tablet Take 1 tablet by mouth Daily. 90 tablet 11    Combivent Respimat  MCG/ACT inhaler INHALE 1 PUFF BY MOUTH 4 (FOUR) TIMES A DAY. 4 g 10    enalapril (VASOTEC) 10 MG tablet   5    flunisolide (NASALIDE) 25 MCG/ACT (0.025%) solution nasal spray Inhale 2 sprays Daily. 1 bottle 5    gabapentin (NEURONTIN) 600 MG tablet Take 1 tablet by mouth 3 (Three) Times a Day. 90 tablet 0    omeprazole (priLOSEC) 40 MG capsule TAKE 1 CAPSULE BY MOUTH EVERY DAY 90 capsule 1    oxyCODONE (ROXICODONE) 10 MG tablet       polyethylene glycol (MIRALAX) 17 g packet Take 17 g by mouth Daily. 30 packet 11    simvastatin (ZOCOR) 40 MG tablet       vitamin D3 125 MCG (5000 UT) capsule capsule Take 1 capsule by mouth Daily. 30 capsule 11     Current Facility-Administered Medications on File Prior to Visit   Medication Dose Route Frequency Provider Last Rate Last Admin    cyanocobalamin injection 1,000 mcg  1,000 mcg Intramuscular Weekly Diana Priest MD   1,000 mcg at 08/01/23 0953              Which medication are you concerned about: ENEMA AND THE MEDICATION NEEDED TO TAKE PRIOR TO BIOPSY. THE PHARMACY IS THE CORRECT PHARMACY IN CHART. [facility]    Who prescribed you this medication:

## 2023-08-16 ENCOUNTER — TELEPHONE (OUTPATIENT)
Dept: UROLOGY | Facility: CLINIC | Age: 68
End: 2023-08-16

## 2023-08-16 NOTE — TELEPHONE ENCOUNTER
Called pt & informed he will be called to reschedule pending machine coming in..she states he already took the pills so a new RX will be needed..
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
## 2023-08-21 ENCOUNTER — OFFICE VISIT (OUTPATIENT)
Dept: SURGERY | Facility: CLINIC | Age: 68
End: 2023-08-21
Payer: MEDICARE

## 2023-08-21 VITALS
OXYGEN SATURATION: 96 % | TEMPERATURE: 97.6 F | WEIGHT: 156 LBS | SYSTOLIC BLOOD PRESSURE: 118 MMHG | HEART RATE: 83 BPM | HEIGHT: 71 IN | DIASTOLIC BLOOD PRESSURE: 72 MMHG | BODY MASS INDEX: 21.84 KG/M2

## 2023-08-21 DIAGNOSIS — Z12.11 SCREENING FOR COLON CANCER: Primary | ICD-10-CM

## 2023-08-21 PROCEDURE — 1160F RVW MEDS BY RX/DR IN RCRD: CPT | Performed by: SURGERY

## 2023-08-21 PROCEDURE — S0260 H&P FOR SURGERY: HCPCS | Performed by: SURGERY

## 2023-08-21 PROCEDURE — 1159F MED LIST DOCD IN RCRD: CPT | Performed by: SURGERY

## 2023-08-21 RX ORDER — POLYETHYLENE GLYCOL 3350 17 G/17G
POWDER, FOR SOLUTION ORAL
Qty: 238 G | Refills: 0 | Status: SHIPPED | OUTPATIENT
Start: 2023-08-21

## 2023-08-21 RX ORDER — BISACODYL 5 MG/1
TABLET, DELAYED RELEASE ORAL
Qty: 4 TABLET | Refills: 0 | Status: SHIPPED | OUTPATIENT
Start: 2023-08-21

## 2023-08-21 NOTE — TELEPHONE ENCOUNTER
PT WIFE CALLED TO CHECK IF MACHINE HAS BEEN RECVD AND IF WE ARE READY TO RESCHEDULE.  PLEASE CALL PT WIFE BACK TO CONFIRM

## 2023-08-22 PROBLEM — Z12.11 SCREENING FOR COLON CANCER: Status: ACTIVE | Noted: 2023-08-22

## 2023-08-25 ENCOUNTER — TELEPHONE (OUTPATIENT)
Dept: SURGERY | Facility: CLINIC | Age: 68
End: 2023-08-25
Payer: MEDICARE

## 2023-08-25 NOTE — TELEPHONE ENCOUNTER
Pt's wife, Anabelle, called this morning and Michael is need to R/S his colonoscopy with Dr. Good. A good number to call her back at is [phone]. Thank!

## 2023-09-01 ENCOUNTER — TELEPHONE (OUTPATIENT)
Dept: UROLOGY | Facility: CLINIC | Age: 68
End: 2023-09-01
Payer: MEDICARE

## 2023-09-01 ENCOUNTER — TELEPHONE (OUTPATIENT)
Dept: UROLOGY | Facility: CLINIC | Age: 68
End: 2023-09-01

## 2023-09-01 NOTE — TELEPHONE ENCOUNTER
Caller: MAGDA    Relationship to patient: SELF    Best call back number: [phone]    Patient is needing: PT CALLED IN TO R/S BIOPSY.

## 2023-09-19 ENCOUNTER — TELEPHONE (OUTPATIENT)
Dept: SURGERY | Facility: CLINIC | Age: 68
End: 2023-09-19
Payer: MEDICARE

## 2023-09-19 NOTE — TELEPHONE ENCOUNTER
Pt rescheduled at [facility] for colonoscopy on 10/27/23

## 2023-09-25 ENCOUNTER — PROCEDURE VISIT (OUTPATIENT)
Dept: UROLOGY | Facility: CLINIC | Age: 68
End: 2023-09-25

## 2023-09-25 DIAGNOSIS — C61 PROSTATE CANCER: Primary | ICD-10-CM

## 2023-09-25 PROCEDURE — 55700 PR PROSTATE NEEDLE BIOPSY ANY APPROACH: CPT | Performed by: UROLOGY

## 2023-09-25 PROCEDURE — 76942 ECHO GUIDE FOR BIOPSY: CPT | Performed by: UROLOGY

## 2023-09-25 NOTE — PROGRESS NOTES
TRUS BIOPSY OF PROSTATE    Preoperative diagnosis  Elevated PSA    Postoperative diagnosis  Elevated PSA    Procedure  1.  Transrectal ultrasound of the prostate  2.  Transrectal ultrasound guidance of needle biopsy  3.  Prostate biopsy    Attending Surgeon  Ramiro Coe MD    Anesthesia  2% lidocaine solution, periprostatic injection, 10mL    Complications  None    Specimen  Prostate biopsy x 16    Indications  Mr. Bridges is a 68 y.o. year old male with Prostate Cancer on Active Surveillance:  Lab Results   Component Value Date    PSA 10.300 (H) 07/12/2022    PSA 7.090 (H) 02/16/2022    PSA 8.030 (H) 01/12/2022       IMPRESSION:  Impression:  1. Right posterolateral apex peripheral zone 9 x 6 mm lesion, corresponding to an assessment category of PIRADS 4 - High (clinically significant cancer is likely to be present).  2. No findings of EPE.  3. No lymphadenopathy in the pelvis.    After discussing his options, the patient decided to proceed with prostate biopsy.  Informed consent was obtained. Possible complications were discussed with the patient during his last visit including, but not limited to, hematuria, hematochezia, prostatitis, urinary tract infection, sepsis, and urinary retention.  He did start the prescribed oral antibiotic regimen.    Procedure  The patient was positioned and prepped in a left lateral position with lower extremities flexed.  Lidocaine jelly, 2%, was injected per rectum. A digital rectal exam was performed.The rectal ultrasound probe was slowly introduced into the rectum without difficulty.  The prostate and seminal vesicles were inspected systematically using cross and sagittal views with the ultrasound.  The dimensions of the prostate were measured.  Using a true cut 14 Fr biopsy needle, 16 prostate cores were collected. The specific locations were the following: left lateral base, left lateral mid, left lateral apex, left medial base, left medial mid, and left medial apex, right  lateral base, right lateral mid, right lateral apex, right medial base, right medial mid, right medial apex, and right and left transition zones. The rectal ultrasound probe was removed.  A DENNISE was again performed revealing little blood in the rectum.  The patient tolerated the procedure well.    Plan  1.  The patient was instructed to drink plenty of fluids and warned about possible complications and side effects including, but not limited to, blood in the urine, stool and semen as well as bloodstream infection.  He was instructed to call the office if there are any issues, especially fevers or flu-like symptoms.    2.  Continue antibiotic for a total of 3 days.  3.  The patient will return to clinic in approximately 1 week for discussion of the pathological report.

## 2023-09-27 LAB — REF LAB TEST METHOD: NORMAL

## 2023-09-28 ENCOUNTER — TELEPHONE (OUTPATIENT)
Dept: UROLOGY | Facility: CLINIC | Age: 68
End: 2023-09-28
Payer: MEDICARE

## 2023-09-28 NOTE — TELEPHONE ENCOUNTER
Caller: CLARENCE    Relationship: Provider    Best call back number: [phone]    What is the best time to reach you: ANY    Who are you requesting to speak with (clinical staff, provider,  specific staff member): CLINICAL    Do you know the name of the person who called: CLINICAL    What was the call regarding: PATHOLOGY REPORT, WILL ALSO FAX RESULTS.    UNABLE TO WARM FLOWERS.

## 2023-10-02 ENCOUNTER — TELEPHONE (OUTPATIENT)
Dept: SURGERY | Facility: CLINIC | Age: 68
End: 2023-10-02
Payer: MEDICARE

## 2023-10-02 ENCOUNTER — TELEPHONE (OUTPATIENT)
Dept: UROLOGY | Facility: CLINIC | Age: 68
End: 2023-10-02
Payer: MEDICARE

## 2023-10-02 NOTE — TELEPHONE ENCOUNTER
Caller: Aleah Bridges    Relationship to patient: Emergency Contact    Best call back number: [phone]    Patient is needing: RECEIVED A CALL FROM PT'S SPOUSE ALEAHSMOOTH SYYLES. PT WAS ADDED TO WAIT LIST. INFORMED HER THAT IF WE HAVE ANY CANCELLATIONS FROM NOW UNTIL SCHEDULED APPT WE WILL GIVE HER A CALL.

## 2023-10-02 NOTE — TELEPHONE ENCOUNTER
Hub staff attempted to follow warm transfer process and was unsuccessful     Caller: Anabelle Bridges     Relationship to patient: SPOUSE    Best call back number: [phone]     Patient is needing: PT HAD A BIOPSY DONE ON 9-25-23 AND BIOPSY RESULTS ARE IN PT'S MY CHART. RESULTS HAVE BEEN REVIEWED AND PT AND HIS WIFE HAVE QUESTIONS.    PT HAS AN APPT W/DR OLMOS ON 10-12-23 TO DISCUSS THOSE RESULTS, PT'S WIFE ASKING IF THEY CAN BE SEEN SOONER. PLEASE GIVE THEM A CALL BACK.

## 2023-10-04 RX ORDER — OMEPRAZOLE 40 MG/1
CAPSULE, DELAYED RELEASE ORAL
Qty: 90 CAPSULE | Refills: 0 | Status: SHIPPED | OUTPATIENT
Start: 2023-10-04

## 2023-10-11 ENCOUNTER — OFFICE VISIT (OUTPATIENT)
Dept: FAMILY MEDICINE CLINIC | Facility: CLINIC | Age: 68
End: 2023-10-11
Payer: MEDICARE

## 2023-10-11 VITALS
SYSTOLIC BLOOD PRESSURE: 114 MMHG | HEIGHT: 71 IN | OXYGEN SATURATION: 99 % | WEIGHT: 157.2 LBS | TEMPERATURE: 97.9 F | HEART RATE: 77 BPM | DIASTOLIC BLOOD PRESSURE: 62 MMHG | BODY MASS INDEX: 22.01 KG/M2

## 2023-10-11 DIAGNOSIS — E53.8 COBALAMIN DEFICIENCY: Primary | ICD-10-CM

## 2023-10-11 DIAGNOSIS — Z23 NEED FOR INFLUENZA VACCINATION: ICD-10-CM

## 2023-10-11 RX ADMIN — CYANOCOBALAMIN 1000 MCG: 1000 INJECTION, SOLUTION INTRAMUSCULAR; SUBCUTANEOUS at 10:27

## 2023-10-11 NOTE — PROGRESS NOTES
Subjective   Michael Bridges is a 68 y.o. male.     History of Present Illness  Here for weight check as some weight loss noted at last visit. Weight currently stable. Appetite stable.    Needs Flu shot today    Has chronic B12 def. Due for B12 shot today.    Otherwise denies acute c/o.      The following portions of the patient's history were reviewed and updated as appropriate: allergies, current medications, past family history, past medical history, past social history, past surgical history, and problem list.    Review of Systems   Constitutional:  Positive for fatigue. Negative for appetite change, fever and unexpected weight change.   HENT:  Positive for congestion and postnasal drip. Negative for mouth sores, nosebleeds, rhinorrhea, sore throat and trouble swallowing.    Eyes:  Negative for visual disturbance.   Respiratory:  Positive for cough (dry, intermittent) and shortness of breath (mild with exertion). Negative for wheezing.    Cardiovascular:  Positive for leg swelling (mild, stable). Negative for chest pain and palpitations.   Gastrointestinal:  Negative for abdominal pain, constipation, nausea and vomiting.   Endocrine: Positive for cold intolerance. Negative for polydipsia and polyuria.   Genitourinary:  Negative for decreased urine volume, dysuria and hematuria.   Musculoskeletal:  Positive for arthralgias, back pain, gait problem, myalgias, neck pain and neck stiffness.   Skin:  Negative for rash and wound.   Neurological:  Positive for dizziness, tremors, weakness, numbness and headaches. Negative for syncope.   Hematological:  Negative for adenopathy. Does not bruise/bleed easily.   Psychiatric/Behavioral:  Positive for confusion (mild, intermittent) and sleep disturbance. Negative for dysphoric mood. The patient is nervous/anxious.    Pt's previous ROS reviewed and updated as indicated.       Objective   Vitals:    10/11/23 0937   BP: 114/62   Pulse: 77   Temp: 97.9 øF (36.6 øC)   SpO2:  99%     Body mass index is 21.92 kg/mý.      10/11/23  0937   Weight: 71.3 kg (157 lb 3.2 oz)       Physical Exam  Vitals and nursing note reviewed.   Constitutional:       General: He is not in acute distress.     Appearance: He is well-developed, well-groomed and normal weight. He is not ill-appearing.      Comments: Appears older than stated age.   HENT:      Head: Atraumatic.      Mouth/Throat:      Mouth: Mucous membranes are moist.   Eyes:      General: No scleral icterus.     Conjunctiva/sclera: Conjunctivae normal.   Neck:      Thyroid: No thyroid mass.   Cardiovascular:      Rate and Rhythm: Normal rate and regular rhythm.      Pulses:           Dorsalis pedis pulses are 1+ on the right side and 1+ on the left side.        Posterior tibial pulses are 1+ on the right side and 1+ on the left side.      Heart sounds: Heart sounds are distant.   Pulmonary:      Effort: Pulmonary effort is normal.      Breath sounds: Decreased breath sounds present. No wheezing, rhonchi or rales.   Abdominal:      General: Abdomen is scaphoid.   Musculoskeletal:      Right lower leg: No edema.      Left lower leg: No edema.   Lymphadenopathy:      Cervical: No cervical adenopathy.   Skin:     General: Skin is warm and dry.      Coloration: Skin is not cyanotic, jaundiced or pale.      Findings: No bruising or rash.   Neurological:      Mental Status: He is alert and oriented to person, place, and time. Mental status is at baseline.      Motor: Weakness (lower extremities, appears at baseline) present.      Gait: Gait abnormal (antalgic, requires assistance, using single prong cane).   Psychiatric:         Mood and Affect: Mood and affect normal.         Behavior: Behavior normal. Behavior is cooperative.         Cognition and Memory: Cognition normal.     Pt's previous physical exam reviewed and updated as indicated.    Lab Results   Component Value Date    VQVGRDGO39 1,239 (H) 08/01/2023     Lab Results   Component Value Date     WBC 5.97 08/01/2023    HGB 15.7 08/01/2023    HCT 46.2 08/01/2023    MCV 89.2 08/01/2023     08/01/2023         Assessment & Plan   Diagnoses and all orders for this visit:    1. Cobalamin deficiency (Primary)    2. Need for influenza vaccination    Other orders  -     Fluzone High-Dose 65+yrs ([id number])       Continue B12 replacement.  Weight stable. Encouraged calorie dense food intake.  F/u with specialists as scheduled.  F/u per routine, f/u sooner as needed.  Patient was encouraged to keep me informed of any acute changes, or any new concerning symptoms.  Pt is aware of reasons to seek emergent care.  Patient voiced understanding of all instructions and denied further questions.    Please note that portions of this note may have been completed with a voice recognition program.

## 2023-10-12 ENCOUNTER — OFFICE VISIT (OUTPATIENT)
Dept: UROLOGY | Facility: CLINIC | Age: 68
End: 2023-10-12
Payer: MEDICARE

## 2023-10-12 DIAGNOSIS — C61 PROSTATE CANCER: Primary | ICD-10-CM
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
## 2023-10-17 ENCOUNTER — TELEPHONE (OUTPATIENT)
Dept: UROLOGY | Facility: CLINIC | Age: 68
End: 2023-10-17
Payer: MEDICARE

## 2023-10-23 ENCOUNTER — TELEPHONE (OUTPATIENT)
Dept: SURGERY | Facility: CLINIC | Age: 68
End: 2023-10-23
Payer: MEDICARE

## 2023-10-23 NOTE — TELEPHONE ENCOUNTER
Needs to cancel colonoscopy scheduled 10/26/2023 at [facility]. Having prostate surgery and will call back and reschedule after prostate surgery.

## 2023-11-01 ENCOUNTER — OFFICE VISIT (OUTPATIENT)
Dept: UROLOGY | Facility: CLINIC | Age: 68
End: 2023-11-01
Payer: MEDICARE

## 2023-11-01 VITALS — OXYGEN SATURATION: 95 % | HEIGHT: 71 IN | WEIGHT: 157 LBS | HEART RATE: 71 BPM | BODY MASS INDEX: 21.98 KG/M2

## 2023-11-01 DIAGNOSIS — C61 PROSTATE CANCER: Primary | ICD-10-CM

## 2023-11-01 LAB
BILIRUB BLD-MCNC: NEGATIVE MG/DL
CLARITY, POC: CLEAR
COLOR UR: YELLOW
EXPIRATION DATE: ABNORMAL
GLUCOSE UR STRIP-MCNC: NEGATIVE MG/DL
KETONES UR QL: NEGATIVE
LEUKOCYTE EST, POC: NEGATIVE
Lab: ABNORMAL
NITRITE UR-MCNC: NEGATIVE MG/ML
PH UR: 7 [PH] (ref 5–8)
PROT UR STRIP-MCNC: NEGATIVE MG/DL
RBC # UR STRIP: ABNORMAL /UL
SP GR UR: 1.01 (ref 1–1.03)
UROBILINOGEN UR QL: NORMAL
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
## 2023-11-02 ENCOUNTER — LAB (OUTPATIENT)
Dept: LAB | Facility: HOSPITAL | Age: 68
End: 2023-11-02
Payer: MEDICARE

## 2023-11-02 DIAGNOSIS — C61 PROSTATE CANCER: ICD-10-CM

## 2023-11-02 PROCEDURE — 84153 ASSAY OF PSA TOTAL: CPT | Performed by: UROLOGY

## 2023-11-03 ENCOUNTER — TELEPHONE (OUTPATIENT)
Dept: UROLOGY | Facility: CLINIC | Age: 68
End: 2023-11-03

## 2023-11-03 ENCOUNTER — TELEPHONE (OUTPATIENT)
Dept: UROLOGY | Facility: CLINIC | Age: 68
End: 2023-11-03
Payer: MEDICARE

## 2023-11-03 NOTE — TELEPHONE ENCOUNTER
PT WIFE CALLED BACK FOR UPDATE, SHE IS CONCERNED PSA IS SHOWING THE RESULTS OF 15 IN MYCHART. PLEASE CALL TO DISCUSS.

## 2023-11-03 NOTE — TELEPHONE ENCOUNTER
I called patient and wife back, I explained that I would send this to  and see if for any reason patient needs to come in sooner than 12/11/23. Pt also has an appointment with Dr. Soliman before he comes back in December, they are concerned that the PSA number went up.

## 2023-11-03 NOTE — TELEPHONE ENCOUNTER
Caller: CLARENCE    Relationship to patient: WIFE    Best call back number: [phone]    Patient is needing: [facility] AND WOULD LIKE TO DISCUSS NUMBERS WITH YOU.

## 2023-11-03 NOTE — TELEPHONE ENCOUNTER
----- Message from Vaibhav Buckner MD sent at 11/2/2023  7:11 PM EDT -----  Please let patient know his PSA is 15.4 from 10 last year, continues to rise.  I recommend he see radiation oncology as scheduled in a few weeks.    Vaibhav Buckner MD

## 2023-11-03 NOTE — TELEPHONE ENCOUNTER
Spoke with the patient's wife and let them know that his PSA level has became higher and that Dr. Buckner does want them to make sure to keep their appointment with radiation/oncology.

## 2023-11-06 NOTE — TELEPHONE ENCOUNTER
Hub staff attempted to follow warm transfer process and was unsuccessful     Caller: Anabelle Bridges     Relationship to patient: SELF    Best call back number: [phone]     Patient is needing: PTS WIFE CALL AND STATED THAT SHE RECEIVED A CALL FROM THE OFFICE ABOUT HIS APPT. PLEASE GIVE THE PTS WIFE A CALL BACK TO DISCUSS

## 2023-11-06 NOTE — TELEPHONE ENCOUNTER
Fay if they want to see me sooner that is fine.  Squeeze him in somewhere.  It sounds like he has chosen radiation, but I will need to see him to make all the arrangements.  Tell them I said that rising PSA is expected, and would not change his outcome as long as he had treatment.

## 2023-11-15 ENCOUNTER — HOSPITAL ENCOUNTER (OUTPATIENT)
Dept: RADIATION ONCOLOGY | Facility: HOSPITAL | Age: 68
Setting detail: RADIATION/ONCOLOGY SERIES
Discharge: HOME | End: 2023-11-15
Payer: MEDICARE

## 2023-11-16 ENCOUNTER — OFFICE VISIT (OUTPATIENT)
Dept: UROLOGY | Facility: CLINIC | Age: 68
End: 2023-11-16
Payer: MEDICARE

## 2023-11-16 VITALS
SYSTOLIC BLOOD PRESSURE: 118 MMHG | TEMPERATURE: 98.9 F | HEIGHT: 71 IN | DIASTOLIC BLOOD PRESSURE: 60 MMHG | WEIGHT: 157 LBS | RESPIRATION RATE: 17 BRPM | OXYGEN SATURATION: 98 % | BODY MASS INDEX: 21.98 KG/M2 | HEART RATE: 60 BPM

## 2023-11-16 DIAGNOSIS — C61 PROSTATE CANCER: Primary | ICD-10-CM

## 2023-11-16 DIAGNOSIS — N52.9 ERECTILE DYSFUNCTION, UNSPECIFIED ERECTILE DYSFUNCTION TYPE: ICD-10-CM

## 2023-11-16 RX ORDER — SILDENAFIL CITRATE 20 MG/1
100 TABLET ORAL DAILY
Qty: 90 TABLET | Refills: 4 | Status: SHIPPED | OUTPATIENT
Start: 2023-11-16
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
## 2023-11-30 ENCOUNTER — OFFICE VISIT (OUTPATIENT)
Dept: UROLOGY | Facility: CLINIC | Age: 68
End: 2023-11-30
Payer: MEDICARE

## 2023-11-30 VITALS — HEIGHT: 71 IN | WEIGHT: 157 LBS | BODY MASS INDEX: 21.98 KG/M2 | OXYGEN SATURATION: 96 % | HEART RATE: 63 BPM

## 2023-11-30 DIAGNOSIS — J44.9 CHRONIC OBSTRUCTIVE PULMONARY DISEASE, UNSPECIFIED COPD TYPE: ICD-10-CM

## 2023-11-30 DIAGNOSIS — C61 PROSTATE CANCER: Primary | ICD-10-CM

## 2023-11-30 RX ORDER — SCOLOPAMINE TRANSDERMAL SYSTEM 1 MG/1
1 PATCH, EXTENDED RELEASE TRANSDERMAL CONTINUOUS
OUTPATIENT
Start: 2023-11-30 | End: 2023-12-03

## 2023-11-30 RX ORDER — GABAPENTIN 100 MG/1
600 CAPSULE ORAL ONCE
OUTPATIENT
Start: 2023-11-30 | End: 2023-11-30

## 2023-11-30 RX ORDER — MELOXICAM 7.5 MG/1
15 TABLET ORAL ONCE
OUTPATIENT
Start: 2023-11-30 | End: 2023-11-30

## 2023-11-30 NOTE — PROGRESS NOTES
Follow Up Office Visit      Patient Name: Michael Bridges  : 1955   MRN: [id number]     Chief Complaint:    Chief Complaint   Patient presents with    Discuss Surgery    Prostate Cancer    Erectile Dysfunction       Referring Provider: No ref. provider found    History of Present Illness: Michael Bridges is a 68 y.o. male who presents today for follow up of favorable intermediate risk prostate cancer.  Patient has a history of elevated and rising PSA.  Previously following with Dr. Coe.  He was referred to me recently for evaluation regarding robotic prostatectomy.  He has a history of COPD secondary to tobacco abuse.  Patient is found to have a 47 g prostate, PI-RADS 4 lesion in the right posterior lateral apex, biopsy demonstrated grade group 2 disease in 2 of 4 cores at the right apex, grade group 1 disease in the right base and left base.    He has mild to moderate lower urinary tract symptoms, IPSS 6.  He manages with no alpha-blocker therapy at this time.  States he is normally experiencing a fairly preserved strength of stream.  Appears to be on chronic oxycodone.     At last visit 2023, we had a thorough discussion regarding treatment options.  Given his baseline COPD and difficult ambulation I recommend consider radiation oncology referral to discuss [facility].  After thorough discussion with myself and again with Dr. Coe, the patient has elected to avoid radiation and proceed with robotic prostatectomy.    He does not follow with a pulmonary specialist.  He does not use oxygen at home, uses Combivent inhalers presumably for COPD.  He has baseline erectile dysfunction but is not currently sexually active.  He denies any previous abdominal surgeries.  He is trying to quit smoking.    Today states he would like to proceed with robotic prostatectomy.    Subjective      Review of System: Review of Systems   Genitourinary:  Positive for difficulty urinating.      I have reviewed  the ROS documented by my clinical staff, I have updated appropriately and I agree. Vaibhav Buckner MD    I have reviewed and the following portions of the patient's history were updated as appropriate: past family history, past medical history, past social history, past surgical history and problem list.    Medications:     Current Outpatient Medications:     aspirin 81 MG EC tablet, Take 1 tablet by mouth Daily., Disp: 90 tablet, Rfl: 11    bisacodyl 5 MG EC tablet, Use as directed., Disp: 4 tablet, Rfl: 0    Combivent Respimat  MCG/ACT inhaler, INHALE 1 PUFF BY MOUTH 4 (FOUR) TIMES A DAY., Disp: 4 g, Rfl: 10    enalapril (VASOTEC) 10 MG tablet, , Disp: , Rfl: 5    flunisolide (NASALIDE) 25 MCG/ACT (0.025%) solution nasal spray, Inhale 2 sprays Daily., Disp: 1 bottle, Rfl: 5    gabapentin (NEURONTIN) 600 MG tablet, Take 1 tablet by mouth 3 (Three) Times a Day., Disp: 90 tablet, Rfl: 0    omeprazole (priLOSEC) 40 MG capsule, TAKE 1 CAPSULE BY MOUTH ONCE DAILY, Disp: 90 capsule, Rfl: 0    oxyCODONE (ROXICODONE) 10 MG tablet, , Disp: , Rfl:     polyethylene glycol (MIRALAX) 17 g packet, Take 17 g by mouth Daily., Disp: 30 packet, Rfl: 11    polyethylene glycol (MIRALAX) 17 GM/SCOOP powder, Mix 238g powder with 64 oz of clear liquid. Use as directed., Disp: 238 g, Rfl: 0    sildenafil (REVATIO) 20 MG tablet, Take 5 tablets by mouth Daily. 1 hr prior to sexual activity, Disp: 90 tablet, Rfl: 4    simvastatin (ZOCOR) 40 MG tablet, , Disp: , Rfl:     vitamin D3 125 MCG (5000 UT) capsule capsule, Take 1 capsule by mouth Daily., Disp: 30 capsule, Rfl: 11    Current Facility-Administered Medications:     cyanocobalamin injection 1,000 mcg, 1,000 mcg, Intramuscular, Weekly, Diana Priest MD, 1,000 mcg at 10/11/23 1027    Allergies:   Allergies   Allergen Reactions    Chantix [Varenicline] Other (See Comments)     Patient c/o suicidal thoughts    Contrast Dye (Echo Or Unknown Ct/Mr) Hives    Tramadol Hives     "Acetaminophen-Codeine Nausea Only     sweat    Darvon [Propoxyphene] Other (See Comments)     MADE LEFT SIDE \"NUMB\"    Iodinated Contrast Media Nausea And Vomiting    Morphine And Related      Sweat and can't urinate    Tylenol [Acetaminophen]      Tylenol with Codeine #3 TABS    Hydrocodone-Acetaminophen Itching and Rash       IPSS Questionnaire (AUA-7):  Over the past month…    1)  Incomplete Emptying:       How often have you had a sensation of not emptying you had the sensation of not emptying your bladder completely after you finished urinating?  0 - Not at all   2)  Frequency:       How often have you had the urinate again less than two hours after you finished urinating?  2 - Less than half the time   3)  Intermittency:       How often have you found you stopped and started again several times when you urinated?   1 - Less than 1 time in 5   4) Urgency:      How often have you found it difficult to postpone urination?  1 - Less than 1 time in 5   5) Weak Stream:      How often have you had a weak urinary stream?  1 - Less than 1 time in 5   6) Straining:       How often have you had to push or strain to begin urination?  0 - Not at all   7) Nocturia:      How many times did you most typically get up to urinate from the time you went to bed at night until the time you got up in the morning?  1 - 1 time   Total Score:  6   The International Prostate Symptom Score (IPSS) is used to screen, diagnose, track symptoms of benign prostatic hyperplasia (BPH).   0-7 (Mild Symptoms) 8-19 (Moderate) 20-35 (Severe)   Quality of Life (QoL):  If you were to spend the rest of your life with your urinary condition just the way it is now, how would you feel about that? 5-Unhappy   Urine Leakage (Incontinence) 0-No Leakage     Sexual Health Inventory for Men (SHANA)   Over the past 6 months:     1. How do you rate your confidence that you could get and keep an erection?  2 - Low   2. When you had erections with sexual  " "stimulation, how often were your erections hard enough for penetration (entering your partner)?  2 - A few times (much less than half the time)   3. During sexual intercourse, how often were you able to maintain your erection after you had penetrated (entered) your partner?  2 - A few times (much less than half the time)   4. During sexual intercourse, how difficult was it to maintain your erection to completion of intercourse?  3 - Difficult    5. When you attempted sexual intercourse, how often was it satisfactory for you?  2 - A few times (much less than half the time)    Total Score: 11   The Sexual Health Inventory for Men further classifies ED severity with the following breakpoints:   1-7 (Severe ED) 8-11 (Moderate ED) 12-16 (Mild to Moderate ED) 17-21 (Mild ED)         Objective     Physical Exam:   Vital Signs:   Vitals:    11/30/23 1432   Pulse: 63   SpO2: 96%   Weight: 71.2 kg (157 lb)   Height: 180.3 cm (70.98\")   PainSc: 0-No pain     Body mass index is 21.91 kg/m².     Physical Exam  Constitutional:       Appearance: Normal appearance.   HENT:      Head: Normocephalic and atraumatic.      Nose: Nose normal.   Eyes:      Extraocular Movements: Extraocular movements intact.      Conjunctiva/sclera: Conjunctivae normal.      Pupils: Pupils are equal, round, and reactive to light.   Genitourinary:     Comments: Circumcised phallus, orthotopic meatus, bilaterally descended testicles without masses.  Musculoskeletal:         General: Normal range of motion.      Cervical back: Normal range of motion and neck supple.   Skin:     General: Skin is warm and dry.      Findings: No lesion or rash.   Neurological:      General: No focal deficit present.      Mental Status: He is alert and oriented to person, place, and time. Mental status is at baseline.   Psychiatric:         Mood and Affect: Mood normal.         Behavior: Behavior normal.         Labs:   Brief Urine Lab Results  (Last result in the past 365 days) "        Color   Clarity   Blood   Leuk Est   Nitrite   Protein   CREAT   Urine HCG        11/01/23 1037 Yellow   Clear   1+   Negative   Negative   Negative                        Lab Results   Component Value Date    GLUCOSE 127 (H) 08/01/2023    CALCIUM 9.4 08/01/2023     08/01/2023    K 4.5 08/01/2023    CO2 27.5 08/01/2023     08/01/2023    BUN 13 08/01/2023    CREATININE 0.95 08/01/2023    EGFRIFAFRI 102 01/12/2022    EGFRIFNONA 84 01/12/2022    BCR 13.7 08/01/2023    ANIONGAP 9.1 (L) 06/16/2018       Lab Results   Component Value Date    WBC 5.97 08/01/2023    HGB 15.7 08/01/2023    HCT 46.2 08/01/2023    MCV 89.2 08/01/2023     08/01/2023       Images:   No Images in the past 120 days found..    Measures:   Tobacco:   Michael Marino Cyrus  reports that he has been smoking cigarettes. He has a 57.00 pack-year smoking history. He has been exposed to tobacco smoke. He has quit using smokeless tobacco.  His smokeless tobacco use included chew and snuff.      Assessment / Plan      Assessment/Plan:   68 y.o. male who presented today for follow up of favorable intermediate risk prostate cancer.  Biopsy demonstrates fairly small volume disease but his PSA is significantly elevated and rising.  I had a long discussion with the patient regarding options for management once again.  He would like to proceed with robotic prostatectomy.  Risks include bleeding, infection, injury to rectum, injury to bowel, injury to surrounding structures like nerves or vessels, urine leak, lymphocele development, infection or abscess, anesthesia related complications related to his COPD requiring ICU admission or prolonged intubation, DVT, PE, MI, CVA, death.    Given his baseline COPD/emphysema I would like to get him evaluated by a pulmonary specialist prior to surgery.  We discussed that a robotic prostatectomy requires Trendelenburg positioning and could be 4 to 5 hours in length and for this reason he could be at  high risk of pulmonary complications.  The patient is amenable to seeing a pulmonary specialist prior to scheduled surgery.     My first available date for robotic prostatectomy is 12/22/2023, [facility].      Diagnoses and all orders for this visit:    1. Prostate cancer (Primary)  -     Case Request; Standing  -     gabapentin (NEURONTIN) capsule 600 mg  -     meloxicam (MOBIC) tablet 15 mg  -     scopolamine patch 1 mg/72 hr  -     CBC (No Diff); Future  -     Basic Metabolic Panel; Future  -     ECG 12 Lead; Future  -     ceFAZolin (ANCEF) 2,000 mg in sodium chloride 0.9 % 100 mL IVPB    Other orders  -     Initiate Observation Status; Standing  -     Follow Anesthesia Guidelines / Protocol; Future  -     Provide NPO Instructions to Patient; Future  -     Provide Hydration Instructions to Patient; Future  -     Provide Patient With Enhanced Recovery Booklet(s) or Handout; Future  -     Provide Chlorhexidine Skin Prep Wipes and Instructions; Future  -     Obtain Informed Consent; Future  -     Nursing to Order Blood Glucose on all Inpatients >18 years Old with not BMP Ordered; Future  -     Nursing to Place Order for HgbA1c on Adult Patients >18 Years Old (HgbA1c Within One Month of Admission Acceptable); Future  -     Follow Anesthesia Guidelines / Protocol; Standing  -     Provide Patient With Enhanced Recovery Booklet(s) OR Handout; Standing  -     Verify NPO Status; Standing  -     Verify the Time Patient Completed [place] / G2; Standing  -     Nurse to Contact Surgeon for Cardiology Consult if Indicated; Future  -     Nurse to Consult  Anesthesia if Indicated; Future           Follow Up:   No follow-ups on file.    I spent approximately 35 minutes providing clinical care for this patient; including review of patient's chart and provider documentation, face to face time spent with patient in examination room (obtaining history, performing physical exam, discussing diagnosis and management options), placing  orders, and completing patient documentation.     Vaibhav Buckner MD  [facility] [place]

## 2023-11-30 NOTE — H&P (VIEW-ONLY)
Follow Up Office Visit      Patient Name: Michael Bridges  : 1955   MRN: [id number]     Chief Complaint:    Chief Complaint   Patient presents with    Discuss Surgery    Prostate Cancer    Erectile Dysfunction       Referring Provider: No ref. provider found    History of Present Illness: Michael Bridges is a 68 y.o. male who presents today for follow up of favorable intermediate risk prostate cancer.  Patient has a history of elevated and rising PSA.  Previously following with Dr. Coe.  He was referred to me recently for evaluation regarding robotic prostatectomy.  He has a history of COPD secondary to tobacco abuse.  Patient is found to have a 47 g prostate, PI-RADS 4 lesion in the right posterior lateral apex, biopsy demonstrated grade group 2 disease in 2 of 4 cores at the right apex, grade group 1 disease in the right base and left base.    He has mild to moderate lower urinary tract symptoms, IPSS 6.  He manages with no alpha-blocker therapy at this time.  States he is normally experiencing a fairly preserved strength of stream.  Appears to be on chronic oxycodone.     At last visit 2023, we had a thorough discussion regarding treatment options.  Given his baseline COPD and difficult ambulation I recommend consider radiation oncology referral to discuss [facility].  After thorough discussion with myself and again with Dr. Coe, the patient has elected to avoid radiation and proceed with robotic prostatectomy.    He does not follow with a pulmonary specialist.  He does not use oxygen at home, uses Combivent inhalers presumably for COPD.  He has baseline erectile dysfunction but is not currently sexually active.  He denies any previous abdominal surgeries.  He is trying to quit smoking.    Today states he would like to proceed with robotic prostatectomy.    Subjective      Review of System: Review of Systems   Genitourinary:  Positive for difficulty urinating.      I have reviewed  the ROS documented by my clinical staff, I have updated appropriately and I agree. Vaibhav Buckner MD    I have reviewed and the following portions of the patient's history were updated as appropriate: past family history, past medical history, past social history, past surgical history and problem list.    Medications:     Current Outpatient Medications:     aspirin 81 MG EC tablet, Take 1 tablet by mouth Daily., Disp: 90 tablet, Rfl: 11    bisacodyl 5 MG EC tablet, Use as directed., Disp: 4 tablet, Rfl: 0    Combivent Respimat  MCG/ACT inhaler, INHALE 1 PUFF BY MOUTH 4 (FOUR) TIMES A DAY., Disp: 4 g, Rfl: 10    enalapril (VASOTEC) 10 MG tablet, , Disp: , Rfl: 5    flunisolide (NASALIDE) 25 MCG/ACT (0.025%) solution nasal spray, Inhale 2 sprays Daily., Disp: 1 bottle, Rfl: 5    gabapentin (NEURONTIN) 600 MG tablet, Take 1 tablet by mouth 3 (Three) Times a Day., Disp: 90 tablet, Rfl: 0    omeprazole (priLOSEC) 40 MG capsule, TAKE 1 CAPSULE BY MOUTH ONCE DAILY, Disp: 90 capsule, Rfl: 0    oxyCODONE (ROXICODONE) 10 MG tablet, , Disp: , Rfl:     polyethylene glycol (MIRALAX) 17 g packet, Take 17 g by mouth Daily., Disp: 30 packet, Rfl: 11    polyethylene glycol (MIRALAX) 17 GM/SCOOP powder, Mix 238g powder with 64 oz of clear liquid. Use as directed., Disp: 238 g, Rfl: 0    sildenafil (REVATIO) 20 MG tablet, Take 5 tablets by mouth Daily. 1 hr prior to sexual activity, Disp: 90 tablet, Rfl: 4    simvastatin (ZOCOR) 40 MG tablet, , Disp: , Rfl:     vitamin D3 125 MCG (5000 UT) capsule capsule, Take 1 capsule by mouth Daily., Disp: 30 capsule, Rfl: 11    Current Facility-Administered Medications:     cyanocobalamin injection 1,000 mcg, 1,000 mcg, Intramuscular, Weekly, Diana Priest MD, 1,000 mcg at 10/11/23 1027    Allergies:   Allergies   Allergen Reactions    Chantix [Varenicline] Other (See Comments)     Patient c/o suicidal thoughts    Contrast Dye (Echo Or Unknown Ct/Mr) Hives    Tramadol Hives     "Acetaminophen-Codeine Nausea Only     sweat    Darvon [Propoxyphene] Other (See Comments)     MADE LEFT SIDE \"NUMB\"    Iodinated Contrast Media Nausea And Vomiting    Morphine And Related      Sweat and can't urinate    Tylenol [Acetaminophen]      Tylenol with Codeine #3 TABS    Hydrocodone-Acetaminophen Itching and Rash       IPSS Questionnaire (AUA-7):  Over the past month…    1)  Incomplete Emptying:       How often have you had a sensation of not emptying you had the sensation of not emptying your bladder completely after you finished urinating?  0 - Not at all   2)  Frequency:       How often have you had the urinate again less than two hours after you finished urinating?  2 - Less than half the time   3)  Intermittency:       How often have you found you stopped and started again several times when you urinated?   1 - Less than 1 time in 5   4) Urgency:      How often have you found it difficult to postpone urination?  1 - Less than 1 time in 5   5) Weak Stream:      How often have you had a weak urinary stream?  1 - Less than 1 time in 5   6) Straining:       How often have you had to push or strain to begin urination?  0 - Not at all   7) Nocturia:      How many times did you most typically get up to urinate from the time you went to bed at night until the time you got up in the morning?  1 - 1 time   Total Score:  6   The International Prostate Symptom Score (IPSS) is used to screen, diagnose, track symptoms of benign prostatic hyperplasia (BPH).   0-7 (Mild Symptoms) 8-19 (Moderate) 20-35 (Severe)   Quality of Life (QoL):  If you were to spend the rest of your life with your urinary condition just the way it is now, how would you feel about that? 5-Unhappy   Urine Leakage (Incontinence) 0-No Leakage     Sexual Health Inventory for Men (SHANA)   Over the past 6 months:     1. How do you rate your confidence that you could get and keep an erection?  2 - Low   2. When you had erections with sexual  " "stimulation, how often were your erections hard enough for penetration (entering your partner)?  2 - A few times (much less than half the time)   3. During sexual intercourse, how often were you able to maintain your erection after you had penetrated (entered) your partner?  2 - A few times (much less than half the time)   4. During sexual intercourse, how difficult was it to maintain your erection to completion of intercourse?  3 - Difficult    5. When you attempted sexual intercourse, how often was it satisfactory for you?  2 - A few times (much less than half the time)    Total Score: 11   The Sexual Health Inventory for Men further classifies ED severity with the following breakpoints:   1-7 (Severe ED) 8-11 (Moderate ED) 12-16 (Mild to Moderate ED) 17-21 (Mild ED)         Objective     Physical Exam:   Vital Signs:   Vitals:    11/30/23 1432   Pulse: 63   SpO2: 96%   Weight: 71.2 kg (157 lb)   Height: 180.3 cm (70.98\")   PainSc: 0-No pain     Body mass index is 21.91 kg/m².     Physical Exam  Constitutional:       Appearance: Normal appearance.   HENT:      Head: Normocephalic and atraumatic.      Nose: Nose normal.   Eyes:      Extraocular Movements: Extraocular movements intact.      Conjunctiva/sclera: Conjunctivae normal.      Pupils: Pupils are equal, round, and reactive to light.   Genitourinary:     Comments: Circumcised phallus, orthotopic meatus, bilaterally descended testicles without masses.  Musculoskeletal:         General: Normal range of motion.      Cervical back: Normal range of motion and neck supple.   Skin:     General: Skin is warm and dry.      Findings: No lesion or rash.   Neurological:      General: No focal deficit present.      Mental Status: He is alert and oriented to person, place, and time. Mental status is at baseline.   Psychiatric:         Mood and Affect: Mood normal.         Behavior: Behavior normal.         Labs:   Brief Urine Lab Results  (Last result in the past 365 days) "        Color   Clarity   Blood   Leuk Est   Nitrite   Protein   CREAT   Urine HCG        11/01/23 1037 Yellow   Clear   1+   Negative   Negative   Negative                        Lab Results   Component Value Date    GLUCOSE 127 (H) 08/01/2023    CALCIUM 9.4 08/01/2023     08/01/2023    K 4.5 08/01/2023    CO2 27.5 08/01/2023     08/01/2023    BUN 13 08/01/2023    CREATININE 0.95 08/01/2023    EGFRIFAFRI 102 01/12/2022    EGFRIFNONA 84 01/12/2022    BCR 13.7 08/01/2023    ANIONGAP 9.1 (L) 06/16/2018       Lab Results   Component Value Date    WBC 5.97 08/01/2023    HGB 15.7 08/01/2023    HCT 46.2 08/01/2023    MCV 89.2 08/01/2023     08/01/2023       Images:   No Images in the past 120 days found..    Measures:   Tobacco:   Michael Marino Cyrus  reports that he has been smoking cigarettes. He has a 57.00 pack-year smoking history. He has been exposed to tobacco smoke. He has quit using smokeless tobacco.  His smokeless tobacco use included chew and snuff.      Assessment / Plan      Assessment/Plan:   68 y.o. male who presented today for follow up of favorable intermediate risk prostate cancer.  Biopsy demonstrates fairly small volume disease but his PSA is significantly elevated and rising.  I had a long discussion with the patient regarding options for management once again.  He would like to proceed with robotic prostatectomy.  Risks include bleeding, infection, injury to rectum, injury to bowel, injury to surrounding structures like nerves or vessels, urine leak, lymphocele development, infection or abscess, anesthesia related complications related to his COPD requiring ICU admission or prolonged intubation, DVT, PE, MI, CVA, death.    Given his baseline COPD/emphysema I would like to get him evaluated by a pulmonary specialist prior to surgery.  We discussed that a robotic prostatectomy requires Trendelenburg positioning and could be 4 to 5 hours in length and for this reason he could be at  high risk of pulmonary complications.  The patient is amenable to seeing a pulmonary specialist prior to scheduled surgery.     My first available date for robotic prostatectomy is 12/22/2023, [facility].      Diagnoses and all orders for this visit:    1. Prostate cancer (Primary)  -     Case Request; Standing  -     gabapentin (NEURONTIN) capsule 600 mg  -     meloxicam (MOBIC) tablet 15 mg  -     scopolamine patch 1 mg/72 hr  -     CBC (No Diff); Future  -     Basic Metabolic Panel; Future  -     ECG 12 Lead; Future  -     ceFAZolin (ANCEF) 2,000 mg in sodium chloride 0.9 % 100 mL IVPB    Other orders  -     Initiate Observation Status; Standing  -     Follow Anesthesia Guidelines / Protocol; Future  -     Provide NPO Instructions to Patient; Future  -     Provide Hydration Instructions to Patient; Future  -     Provide Patient With Enhanced Recovery Booklet(s) or Handout; Future  -     Provide Chlorhexidine Skin Prep Wipes and Instructions; Future  -     Obtain Informed Consent; Future  -     Nursing to Order Blood Glucose on all Inpatients >18 years Old with not BMP Ordered; Future  -     Nursing to Place Order for HgbA1c on Adult Patients >18 Years Old (HgbA1c Within One Month of Admission Acceptable); Future  -     Follow Anesthesia Guidelines / Protocol; Standing  -     Provide Patient With Enhanced Recovery Booklet(s) OR Handout; Standing  -     Verify NPO Status; Standing  -     Verify the Time Patient Completed Gatorade / G2; Standing  -     Nurse to Contact Surgeon for Cardiology Consult if Indicated; Future  -     Nurse to Consult  Anesthesia if Indicated; Future           Follow Up:   No follow-ups on file.    I spent approximately 35 minutes providing clinical care for this patient; including review of patient's chart and provider documentation, face to face time spent with patient in examination room (obtaining history, performing physical exam, discussing diagnosis and management options), placing  orders, and completing patient documentation.     Vaibhav Buckner MD  [facility]

## 2023-12-07 ENCOUNTER — OFFICE VISIT (OUTPATIENT)
Dept: FAMILY MEDICINE CLINIC | Facility: CLINIC | Age: 68
End: 2023-12-07
Payer: MEDICARE

## 2023-12-07 VITALS
HEART RATE: 68 BPM | WEIGHT: 163.4 LBS | TEMPERATURE: 97.7 F | BODY MASS INDEX: 23.39 KG/M2 | DIASTOLIC BLOOD PRESSURE: 62 MMHG | SYSTOLIC BLOOD PRESSURE: 124 MMHG | OXYGEN SATURATION: 96 % | HEIGHT: 70 IN

## 2023-12-07 DIAGNOSIS — J44.1 COPD EXACERBATION: ICD-10-CM

## 2023-12-07 DIAGNOSIS — J06.9 ACUTE URI: Primary | ICD-10-CM

## 2023-12-07 DIAGNOSIS — F17.200 TOBACCO USE DISORDER: ICD-10-CM

## 2023-12-07 LAB
EXPIRATION DATE: NORMAL
FLUAV AG UPPER RESP QL IA.RAPID: NOT DETECTED
FLUBV AG UPPER RESP QL IA.RAPID: NOT DETECTED
INTERNAL CONTROL: NORMAL
Lab: NORMAL
SARS-COV-2 AG UPPER RESP QL IA.RAPID: NOT DETECTED

## 2023-12-07 PROCEDURE — 99214 OFFICE O/P EST MOD 30 MIN: CPT | Performed by: FAMILY MEDICINE

## 2023-12-07 PROCEDURE — 87428 SARSCOV & INF VIR A&B AG IA: CPT | Performed by: FAMILY MEDICINE

## 2023-12-07 PROCEDURE — 1160F RVW MEDS BY RX/DR IN RCRD: CPT | Performed by: FAMILY MEDICINE

## 2023-12-07 PROCEDURE — 1159F MED LIST DOCD IN RCRD: CPT | Performed by: FAMILY MEDICINE

## 2023-12-07 PROCEDURE — 3044F HG A1C LEVEL LT 7.0%: CPT | Performed by: FAMILY MEDICINE

## 2023-12-07 RX ORDER — NICOTINE 21 MG/24HR
1 PATCH, TRANSDERMAL 24 HOURS TRANSDERMAL EVERY 24 HOURS
Qty: 28 EACH | Refills: 2 | Status: SHIPPED | OUTPATIENT
Start: 2023-12-07

## 2023-12-07 RX ORDER — BUPROPION HYDROCHLORIDE 150 MG/1
150 TABLET ORAL DAILY
Qty: 30 TABLET | Refills: 2 | Status: SHIPPED | OUTPATIENT
Start: 2023-12-07

## 2023-12-07 RX ORDER — DOXYCYCLINE 100 MG/1
100 TABLET ORAL 2 TIMES DAILY
Qty: 14 TABLET | Refills: 0 | Status: SHIPPED | OUTPATIENT
Start: 2023-12-07 | End: 2023-12-14

## 2023-12-07 RX ORDER — PREDNISONE 10 MG/1
TABLET ORAL
Qty: 15 TABLET | Refills: 0 | Status: SHIPPED | OUTPATIENT
Start: 2023-12-07

## 2023-12-07 NOTE — PROGRESS NOTES
Subjective   Michael Bridges is a 68 y.o. male.     History of Present Illness  He c/o cough, congestion  URI   This is a new problem. The current episode started yesterday. The problem has been rapidly worsening. There has been no fever. Associated symptoms include congestion, coughing, neck pain and wheezing. Pertinent negatives include no abdominal pain, chest pain, diarrhea, dysuria, headaches, nausea, rash, rhinorrhea, sore throat, swollen glands or vomiting. He has tried nothing for the symptoms.     Has COPD, continues to smoke 1 ppd. Interested in smoking cessation as he has upcoming prostate surgery. Has failed chantix in past due to side effects, mood changes, etc.      The following portions of the patient's history were reviewed and updated as appropriate: allergies, current medications, past family history, past medical history, past social history, past surgical history, and problem list.    Review of Systems   Constitutional:  Positive for fatigue. Negative for appetite change, fever and unexpected weight change.   HENT:  Positive for congestion and postnasal drip. Negative for mouth sores, nosebleeds, rhinorrhea, sore throat and trouble swallowing.    Eyes:  Negative for visual disturbance.   Respiratory:  Positive for cough, shortness of breath (mild with exertion) and wheezing.    Cardiovascular:  Positive for leg swelling (mild, stable). Negative for chest pain and palpitations.   Gastrointestinal:  Negative for abdominal pain, constipation, diarrhea, nausea and vomiting.   Endocrine: Positive for cold intolerance. Negative for polydipsia and polyuria.   Genitourinary:  Negative for decreased urine volume, dysuria and hematuria.   Musculoskeletal:  Positive for arthralgias, back pain, gait problem, myalgias, neck pain and neck stiffness.   Skin:  Negative for rash and wound.   Neurological:  Positive for dizziness, tremors, weakness and numbness. Negative for syncope and headaches.    Hematological:  Negative for adenopathy. Does not bruise/bleed easily.   Psychiatric/Behavioral:  Positive for confusion (mild, intermittent) and sleep disturbance. Negative for dysphoric mood. The patient is nervous/anxious.    Pt's previous ROS reviewed and updated as indicated.       Objective   Vitals:    12/07/23 1059   BP: 124/62   Pulse: 68   Temp: 97.7 °F (36.5 °C)   SpO2: 96%     Body mass index is 23.45 kg/m².      12/07/23  1059   Weight: 74.1 kg (163 lb 6.4 oz)       Physical Exam  Vitals and nursing note reviewed.   Constitutional:       General: He is not in acute distress.     Appearance: He is well-developed, well-groomed and normal weight. He is ill-appearing (mildly).      Comments: Appears older than stated age.   HENT:      Head: Atraumatic.      Right Ear: Tympanic membrane, ear canal and external ear normal.      Left Ear: Tympanic membrane, ear canal and external ear normal.      Nose: Mucosal edema and congestion present. No rhinorrhea.      Mouth/Throat:      Mouth: Mucous membranes are moist. No oral lesions.      Pharynx: Oropharynx is clear. No posterior oropharyngeal erythema.   Eyes:      General: No scleral icterus.     Conjunctiva/sclera: Conjunctivae normal.   Cardiovascular:      Rate and Rhythm: Normal rate and regular rhythm.      Heart sounds: Heart sounds are distant.   Pulmonary:      Effort: Pulmonary effort is normal.      Breath sounds: Decreased breath sounds present. No wheezing, rhonchi or rales.   Musculoskeletal:      Right lower leg: No edema.      Left lower leg: No edema.   Lymphadenopathy:      Cervical: No cervical adenopathy.   Skin:     General: Skin is warm and dry.      Coloration: Skin is not cyanotic, jaundiced or pale.      Findings: No bruising or rash.   Neurological:      Mental Status: He is alert and oriented to person, place, and time. Mental status is at baseline.      Motor: Weakness (lower extremities, appears at baseline) present.      Gait: Gait  abnormal (antalgic, requires assistance, using single prong cane).   Psychiatric:         Behavior: Behavior normal. Behavior is cooperative.         Cognition and Memory: Cognition normal.     Pt's previous physical exam reviewed and updated as indicated.    Recent Results (from the past 72 hour(s))   POCT SARS-CoV-2 Antigen DIONNE + Flu    Collection Time: 12/07/23 11:34 AM    Specimen: Swab   Result Value Ref Range    SARS Antigen Not Detected Not Detected, Presumptive Negative    Influenza A Antigen DIONNE Not Detected Not Detected    Influenza B Antigen DIONNE Not Detected Not Detected    Internal Control Passed Passed    Lot Number [id number]     Expiration Date 11/03/24      Lab Results   Component Value Date    WBC 5.97 08/01/2023    HGB 15.7 08/01/2023    HCT 46.2 08/01/2023    MCV 89.2 08/01/2023     08/01/2023       Lab Results   Component Value Date    GLUCOSE 127 (H) 08/01/2023    BUN 13 08/01/2023    CREATININE 0.95 08/01/2023    EGFRIFNONA 84 01/12/2022    EGFRIFAFRI 102 01/12/2022    BCR 13.7 08/01/2023    K 4.5 08/01/2023    CO2 27.5 08/01/2023    CALCIUM 9.4 08/01/2023    PROTENTOTREF 5.9 (L) 08/01/2023    ALBUMIN 3.8 08/01/2023    LABIL2 1.8 08/01/2023    AST 16 08/01/2023    ALT 15 08/01/2023     Lab Results   Component Value Date    HGBA1C 5.70 (H) 08/01/2023       Lab Results   Component Value Date    TSH 0.981 08/01/2023           Assessment & Plan   Diagnoses and all orders for this visit:    1. Acute URI (Primary)  -     POCT SARS-CoV-2 Antigen DIONNE + Flu    2. COPD exacerbation    3. Tobacco use disorder  -     buPROPion XL (Wellbutrin XL) 150 MG 24 hr tablet; Take 1 tablet by mouth Daily.  Dispense: 30 tablet; Refill: 2  -     nicotine (Nicoderm CQ) 21 MG/24HR patch; Place 1 patch on the skin as directed by provider Daily.  Dispense: 28 each; Refill: 2    Other orders  -     doxycycline (ADOXA) 100 MG tablet; Take 1 tablet by mouth 2 (Two) Times a Day for 7 days.  Dispense: 14 tablet; Refill:  0  -     predniSONE (DELTASONE) 10 MG tablet; 5 po day 1, then decrease by 1 tablet each day until gone (5,4,3,2,1)  Dispense: 15 tablet; Refill: 0       COPD with exacerbation due to URI. Continue combivent, albuterol prn. Oral abx, and CS as above.    I have reviewed risks/benefits and potential side effects of various treatment options for smoking cessation. Pt voices understanding and wishes to proceed with wellbutrin and nicotine replacement.    F/u as scheduled, sooner as needed.  Patient was encouraged to keep me informed of any acute changes, lack of improvement, or any new concerning symptoms.  Pt is aware of reasons to seek emergent care.  Patient voiced understanding of all instructions and denied further questions.    Please note that portions of this note may have been completed with a voice recognition program.

## 2023-12-11 ENCOUNTER — TELEPHONE (OUTPATIENT)
Dept: UROLOGY | Facility: CLINIC | Age: 68
End: 2023-12-11

## 2023-12-11 NOTE — TELEPHONE ENCOUNTER
Hub staff attempted to follow warm transfer process and was unsuccessful     Caller: Anabelle Bridges     Relationship to patient: [employer]    Best call back number: [phone]     Patient is needing: PT HAS APPT TODAY AT 11 BUT PT'S WIFE ASKING IF THAT IS NECESSARY SINCE PT IS SCHEDULED FOR PAT ON 12-15-23 AND SURGERY ON 12-22-23 WITH DR SUMMERS.    PLEASE GIVE PT'S WIFE A CALL BACK TO ADVISE IF PT NEEDS TO COME TODAY OR IF APPT SHOULD BE CANCELLED.

## 2023-12-15 ENCOUNTER — PRE-ADMISSION TESTING (OUTPATIENT)
Dept: PREADMISSION TESTING | Facility: HOSPITAL | Age: 68
End: 2023-12-15
Payer: MEDICARE

## 2023-12-15 VITALS — HEIGHT: 71 IN | BODY MASS INDEX: 22.62 KG/M2 | WEIGHT: 161.6 LBS

## 2023-12-15 DIAGNOSIS — C61 PROSTATE CANCER: ICD-10-CM

## 2023-12-15 LAB
ANION GAP SERPL CALCULATED.3IONS-SCNC: 10 MMOL/L (ref 5–15)
BUN SERPL-MCNC: 12 MG/DL (ref 8–23)
BUN/CREAT SERPL: 11.4 (ref 7–25)
CALCIUM SPEC-SCNC: 9.5 MG/DL (ref 8.6–10.5)
CHLORIDE SERPL-SCNC: 104 MMOL/L (ref 98–107)
CO2 SERPL-SCNC: 27 MMOL/L (ref 22–29)
CREAT SERPL-MCNC: 1.05 MG/DL (ref 0.76–1.27)
DEPRECATED RDW RBC AUTO: 45.7 FL (ref 37–54)
EGFRCR SERPLBLD CKD-EPI 2021: 77.3 ML/MIN/1.73
ERYTHROCYTE [DISTWIDTH] IN BLOOD BY AUTOMATED COUNT: 14.2 % (ref 12.3–15.4)
GLUCOSE SERPL-MCNC: 119 MG/DL (ref 65–99)
HBA1C MFR BLD: 5.8 % (ref 4.8–5.6)
HCT VFR BLD AUTO: 48.3 % (ref 37.5–51)
HGB BLD-MCNC: 16.1 G/DL (ref 13–17.7)
MCH RBC QN AUTO: 29.6 PG (ref 26.6–33)
MCHC RBC AUTO-ENTMCNC: 33.3 G/DL (ref 31.5–35.7)
MCV RBC AUTO: 88.8 FL (ref 79–97)
PLATELET # BLD AUTO: 265 10*3/MM3 (ref 140–450)
PMV BLD AUTO: 9.1 FL (ref 6–12)
POTASSIUM SERPL-SCNC: 4.8 MMOL/L (ref 3.5–5.2)
RBC # BLD AUTO: 5.44 10*6/MM3 (ref 4.14–5.8)
SODIUM SERPL-SCNC: 141 MMOL/L (ref 136–145)
WBC NRBC COR # BLD AUTO: 6.82 10*3/MM3 (ref 3.4–10.8)

## 2023-12-15 PROCEDURE — 93005 ELECTROCARDIOGRAM TRACING: CPT

## 2023-12-15 PROCEDURE — 36415 COLL VENOUS BLD VENIPUNCTURE: CPT

## 2023-12-15 PROCEDURE — 83036 HEMOGLOBIN GLYCOSYLATED A1C: CPT

## 2023-12-15 PROCEDURE — 85027 COMPLETE CBC AUTOMATED: CPT

## 2023-12-15 PROCEDURE — 80048 BASIC METABOLIC PNL TOTAL CA: CPT

## 2023-12-15 RX ORDER — OMEPRAZOLE 40 MG/1
CAPSULE, DELAYED RELEASE ORAL
Qty: 90 CAPSULE | Refills: 0 | Status: SHIPPED | OUTPATIENT
Start: 2023-12-15

## 2023-12-15 NOTE — PAT
Patient viewed general PAT education video as instructed in their preoperative information received from their surgeon.  Patient stated the general PAT education video was viewed in its entirety and survey completed.  Copies of [facility] general education handouts (Incentive Spirometry, Meds to Beds Program, Patient Belongings, Pre-op skin preparation instructions, Blood Glucose testing, Visitor policy, Surgery FAQ, Code H) distributed to patient if not printed. Education related to the PAT pass and skin preparation for surgery (if applicable) completed in PAT as a reinforcement to PAT education video. Patient instructed to return PAT pass provided today as well as completed skin preparation sheet (if applicable) on the day of procedure.     Additionally if patient had not viewed video yet but intended to view it at home or in our waiting area, then referred them to the handout with QR code/link provided during PAT visit.  Instructed patient to complete survey after viewing the video in its entirety.  Encouraged patient/family to read [facility] general education handouts thoroughly and notify PAT staff with any questions or concerns. Patient verbalized understanding of all information and priority content.    An arrival time for procedure was not provided during PAT visit. If patient had any questions or concerns about their arrival time, they were instructed to contact their surgeon/physician.  Additionally, if the patient referred to an arrival time that was acquired from their my chart account, patient was encouraged to verify that time with their surgeon/physician. Arrival times are NOT provided in Pre Admission Testing Department.    Patient denies any current skin issues.     Patient to apply Chlorhexadine wipes  to surgical area (as instructed) the night before procedure and the AM of procedure. Wipes provided.    Patient instructed to drink 20 ounces of Gatorade or Gatorlyte (if diabetic) and it needs to be completed 1  hour (for Main OR patients) or 2 hours (scheduled  section & [facility] patients) before given arrival time for procedure (NO RED Gatorade and NO Gatorade Zero).    Patient verbalized understanding.     CARDIAC RISK IN [facility] FROM DR ORLANDO ON 23. PATIENT DENIES CHEST PAIN AND SHORTNESS OF BREATH.

## 2023-12-16 LAB
QT INTERVAL: 390 MS
QTC INTERVAL: 447 MS

## 2023-12-18 ENCOUNTER — OFFICE VISIT (OUTPATIENT)
Dept: PULMONOLOGY | Facility: CLINIC | Age: 68
End: 2023-12-18
Payer: MEDICARE

## 2023-12-18 VITALS
HEIGHT: 70 IN | BODY MASS INDEX: 23.25 KG/M2 | SYSTOLIC BLOOD PRESSURE: 120 MMHG | DIASTOLIC BLOOD PRESSURE: 64 MMHG | WEIGHT: 162.4 LBS | HEART RATE: 94 BPM | OXYGEN SATURATION: 93 % | TEMPERATURE: 97.8 F

## 2023-12-18 DIAGNOSIS — Z01.811 PREOPERATIVE RESPIRATORY EXAMINATION: Primary | ICD-10-CM

## 2023-12-18 DIAGNOSIS — J43.8 OTHER EMPHYSEMA: ICD-10-CM

## 2023-12-18 DIAGNOSIS — F17.210 CIGARETTE NICOTINE DEPENDENCE WITHOUT COMPLICATION: ICD-10-CM

## 2023-12-18 DIAGNOSIS — C61 PROSTATE CANCER: ICD-10-CM
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
## 2023-12-20 ENCOUNTER — PATIENT ROUNDING (BHMG ONLY) (OUTPATIENT)
Dept: PULMONOLOGY | Facility: CLINIC | Age: 68
End: 2023-12-20
Payer: MEDICARE

## 2023-12-20 NOTE — PROGRESS NOTES
December 20, 2023    Hello, may I speak with Michael Bridges?    My name is roberto carlos       I am  with [facility]  [address] [address] [phone].    Before we get started may I verify your date of birth? 1955    I am calling to officially welcome you to our practice and ask about your recent visit. Is this a good time to talk? yes    Tell me about your visit with us. What things went well?  yes things were alright I felt good about my visit       We're always looking for ways to make our patients' experiences even better. Do you have recommendations on ways we may improve?  no    Overall were you satisfied with your first visit to our practice? yes       I appreciate you taking the time to speak with me today. Is there anything else I can do for you? no      Thank you, and have a great day.

## 2023-12-21 RX ORDER — LEVALBUTEROL TARTRATE 45 UG/1
3 AEROSOL, METERED ORAL ONCE
Status: DISCONTINUED | OUTPATIENT
Start: 2023-12-21 | End: 2023-12-21 | Stop reason: HOSPADM

## 2023-12-21 RX ADMIN — LEVALBUTEROL TARTRATE 3 PUFF: 45 AEROSOL, METERED ORAL at 10:54

## 2023-12-22 ENCOUNTER — HOSPITAL ENCOUNTER (OUTPATIENT)
Facility: HOSPITAL | Age: 68
Discharge: HOME | End: 2023-12-23
Attending: STUDENT IN AN ORGANIZED HEALTH CARE EDUCATION/TRAINING PROGRAM | Admitting: STUDENT IN AN ORGANIZED HEALTH CARE EDUCATION/TRAINING PROGRAM
Payer: MEDICARE

## 2023-12-22 ENCOUNTER — ANESTHESIA EVENT CONVERTED (OUTPATIENT)
Dept: ANESTHESIOLOGY | Facility: HOSPITAL | Age: 68
End: 2023-12-22
Payer: MEDICARE

## 2023-12-22 ENCOUNTER — ANESTHESIA EVENT (OUTPATIENT)
Dept: PERIOP | Facility: HOSPITAL | Age: 68
End: 2023-12-22
Payer: MEDICARE

## 2023-12-22 ENCOUNTER — ANESTHESIA (OUTPATIENT)
Dept: PERIOP | Facility: HOSPITAL | Age: 68
End: 2023-12-22
Payer: MEDICARE

## 2023-12-22 DIAGNOSIS — Z90.79 S/P PROSTATECTOMY: Primary | ICD-10-CM

## 2023-12-22 DIAGNOSIS — C61 PROSTATE CANCER: ICD-10-CM

## 2023-12-22 LAB
ANION GAP SERPL CALCULATED.3IONS-SCNC: 9 MMOL/L (ref 5–15)
BUN SERPL-MCNC: 18 MG/DL (ref 8–23)
BUN/CREAT SERPL: 19.1 (ref 7–25)
CALCIUM SPEC-SCNC: 8.6 MG/DL (ref 8.6–10.5)
CHLORIDE SERPL-SCNC: 103 MMOL/L (ref 98–107)
CO2 SERPL-SCNC: 27 MMOL/L (ref 22–29)
CREAT SERPL-MCNC: 0.94 MG/DL (ref 0.76–1.27)
DEPRECATED RDW RBC AUTO: 46.7 FL (ref 37–54)
EGFRCR SERPLBLD CKD-EPI 2021: 88.3 ML/MIN/1.73
ERYTHROCYTE [DISTWIDTH] IN BLOOD BY AUTOMATED COUNT: 14.5 % (ref 12.3–15.4)
GLUCOSE BLDC GLUCOMTR-MCNC: 138 MG/DL (ref 70–130)
GLUCOSE BLDC GLUCOMTR-MCNC: 81 MG/DL (ref 70–130)
GLUCOSE SERPL-MCNC: 139 MG/DL (ref 65–99)
HCT VFR BLD AUTO: 43.4 % (ref 37.5–51)
HGB BLD-MCNC: 14.7 G/DL (ref 13–17.7)
MCH RBC QN AUTO: 30.1 PG (ref 26.6–33)
MCHC RBC AUTO-ENTMCNC: 33.9 G/DL (ref 31.5–35.7)
MCV RBC AUTO: 88.9 FL (ref 79–97)
PLATELET # BLD AUTO: 233 10*3/MM3 (ref 140–450)
PMV BLD AUTO: 9.4 FL (ref 6–12)
POTASSIUM SERPL-SCNC: 4.6 MMOL/L (ref 3.5–5.2)
RBC # BLD AUTO: 4.88 10*6/MM3 (ref 4.14–5.8)
SODIUM SERPL-SCNC: 139 MMOL/L (ref 136–145)
WBC NRBC COR # BLD AUTO: 16.51 10*3/MM3 (ref 3.4–10.8)

## 2023-12-22 PROCEDURE — 25810000003 SODIUM CHLORIDE PER 500 ML: Performed by: STUDENT IN AN ORGANIZED HEALTH CARE EDUCATION/TRAINING PROGRAM

## 2023-12-22 PROCEDURE — 25010000002 FENTANYL CITRATE (PF) 50 MCG/ML SOLUTION

## 2023-12-22 PROCEDURE — 85027 COMPLETE CBC AUTOMATED: CPT | Performed by: STUDENT IN AN ORGANIZED HEALTH CARE EDUCATION/TRAINING PROGRAM

## 2023-12-22 PROCEDURE — 25010000002 FENTANYL CITRATE (PF) 100 MCG/2ML SOLUTION

## 2023-12-22 PROCEDURE — G0378 HOSPITAL OBSERVATION PER HR: HCPCS

## 2023-12-22 PROCEDURE — 25810000003 LACTATED RINGERS PER 1000 ML: Performed by: ANESTHESIOLOGY

## 2023-12-22 PROCEDURE — 55866 LAPS SURG PRST8ECT RPBIC RAD: CPT | Performed by: PHYSICIAN ASSISTANT

## 2023-12-22 PROCEDURE — 88305 TISSUE EXAM BY PATHOLOGIST: CPT | Performed by: STUDENT IN AN ORGANIZED HEALTH CARE EDUCATION/TRAINING PROGRAM

## 2023-12-22 PROCEDURE — 25010000002 CEFAZOLIN PER 500 MG: Performed by: STUDENT IN AN ORGANIZED HEALTH CARE EDUCATION/TRAINING PROGRAM

## 2023-12-22 PROCEDURE — 25010000002 PROPOFOL 10 MG/ML EMULSION

## 2023-12-22 PROCEDURE — 88309 TISSUE EXAM BY PATHOLOGIST: CPT | Performed by: STUDENT IN AN ORGANIZED HEALTH CARE EDUCATION/TRAINING PROGRAM

## 2023-12-22 PROCEDURE — 25010000002 ONDANSETRON PER 1 MG

## 2023-12-22 PROCEDURE — 94640 AIRWAY INHALATION TREATMENT: CPT

## 2023-12-22 PROCEDURE — 25010000002 SUGAMMADEX 200 MG/2ML SOLUTION

## 2023-12-22 PROCEDURE — 25010000002 BUPIVACAINE (PF) 0.25 % SOLUTION

## 2023-12-22 PROCEDURE — 88344 IMHCHEM/IMCYTCHM EA MLT ANTB: CPT | Performed by: STUDENT IN AN ORGANIZED HEALTH CARE EDUCATION/TRAINING PROGRAM

## 2023-12-22 PROCEDURE — 55866 LAPS SURG PRST8ECT RPBIC RAD: CPT | Performed by: STUDENT IN AN ORGANIZED HEALTH CARE EDUCATION/TRAINING PROGRAM

## 2023-12-22 PROCEDURE — 38571 LAPAROSCOPY LYMPHADENECTOMY: CPT | Performed by: STUDENT IN AN ORGANIZED HEALTH CARE EDUCATION/TRAINING PROGRAM

## 2023-12-22 PROCEDURE — 25810000003 SODIUM CHLORIDE 0.9 % SOLUTION: Performed by: STUDENT IN AN ORGANIZED HEALTH CARE EDUCATION/TRAINING PROGRAM

## 2023-12-22 PROCEDURE — 38571 LAPAROSCOPY LYMPHADENECTOMY: CPT | Performed by: PHYSICIAN ASSISTANT

## 2023-12-22 PROCEDURE — 25010000002 HEPARIN (PORCINE) PER 1000 UNITS: Performed by: STUDENT IN AN ORGANIZED HEALTH CARE EDUCATION/TRAINING PROGRAM

## 2023-12-22 PROCEDURE — 88307 TISSUE EXAM BY PATHOLOGIST: CPT | Performed by: STUDENT IN AN ORGANIZED HEALTH CARE EDUCATION/TRAINING PROGRAM

## 2023-12-22 PROCEDURE — 25010000002 DEXAMETHASONE SODIUM PHOSPHATE 10 MG/ML SOLUTION

## 2023-12-22 PROCEDURE — 82948 REAGENT STRIP/BLOOD GLUCOSE: CPT

## 2023-12-22 PROCEDURE — 80048 BASIC METABOLIC PNL TOTAL CA: CPT | Performed by: STUDENT IN AN ORGANIZED HEALTH CARE EDUCATION/TRAINING PROGRAM

## 2023-12-22 DEVICE — ECHELON 3000 45MM LONG
Type: IMPLANTABLE DEVICE | Site: PELVIS | Status: FUNCTIONAL
Brand: ECHELON

## 2023-12-22 DEVICE — CLIP LIG HEMOLOK PA LG 6CT PRP: Type: IMPLANTABLE DEVICE | Site: PELVIS | Status: FUNCTIONAL

## 2023-12-22 DEVICE — KNOTLESS TISSUE CONTROL DEVICE, VIOLET UNIDIRECTIONAL (ANTIBACTERIAL) SYNTHETIC ABSORBABLE DEVICE
Type: IMPLANTABLE DEVICE | Site: PELVIS | Status: FUNCTIONAL
Brand: STRATAFIX

## 2023-12-22 DEVICE — THE ECHELON, ECHELON ENDOPATH™ AND ECHELON FLEX™ FAMILIES OF ENDOSCOPIC LINEAR CUTTERS AND RELOADS ARE STERILE, SINGLE PATIENT USE INSTRUMENTS THAT SIMULTANEOUSLY CUT AND STAPLE TISSUE. THERE ARE SIX STAGGERED ROWS OF STAPLES, THREE ON EITHER SIDE OF THE CUT LINE. THE 45 MM INSTRUMENTS HAVE A STAPLE LINE THATIS APPROXIMATELY 45 MM LONG AND A CUT LINE THAT IS APPROXIMATELY 42 MM LONG. THE SHAFT CAN ROTATE FREELY IN BOTH DIRECTIONS AND AN ARTICULATION MECHANISM ON ARTICULATING INSTRUMENTS ENABLES BENDING THE DISTAL PORTIONOF THE SHAFT TO FACILITATE LATERAL ACCESS OF THE OPERATIVE SITE.THE INSTRUMENTS ARE SHIPPED WITHOUT A RELOAD AND MUST BE LOADED PRIOR TO USE. A STAPLE RETAINING CAP ON THE RELOAD PROTECTS THE STAPLE LEG POINTS DURING SHIPPING AND TRANSPORTATION. THE INSTRUMENTS’ LOCK-OUT FEATURE IS DESIGNED TO PREVENT A USED RELOAD FROM BEING REFIRED.
Type: IMPLANTABLE DEVICE | Site: PELVIS | Status: FUNCTIONAL
Brand: ECHELON ENDOPATH

## 2023-12-22 DEVICE — FLOSEAL WITH RECOTHROM - 10ML.
Type: IMPLANTABLE DEVICE | Site: PELVIS | Status: FUNCTIONAL
Brand: FLOSEAL HEMOSTATIC MATRIX

## 2023-12-22 RX ORDER — GABAPENTIN 300 MG/1
600 CAPSULE ORAL ONCE
Status: COMPLETED | OUTPATIENT
Start: 2023-12-22 | End: 2023-12-22

## 2023-12-22 RX ORDER — POLYETHYLENE GLYCOL 3350 17 G/17G
17 POWDER, FOR SOLUTION ORAL DAILY
Status: DISCONTINUED | OUTPATIENT
Start: 2023-12-22 | End: 2023-12-23 | Stop reason: HOSPADM

## 2023-12-22 RX ORDER — NICOTINE 21 MG/24HR
1 PATCH, TRANSDERMAL 24 HOURS TRANSDERMAL EVERY 24 HOURS
Status: DISCONTINUED | OUTPATIENT
Start: 2023-12-22 | End: 2023-12-23 | Stop reason: HOSPADM

## 2023-12-22 RX ORDER — AMOXICILLIN 250 MG
1 CAPSULE ORAL 2 TIMES DAILY
Status: DISCONTINUED | OUTPATIENT
Start: 2023-12-22 | End: 2023-12-23 | Stop reason: HOSPADM

## 2023-12-22 RX ORDER — MAGNESIUM HYDROXIDE 1200 MG/15ML
LIQUID ORAL AS NEEDED
Status: DISCONTINUED | OUTPATIENT
Start: 2023-12-22 | End: 2023-12-22 | Stop reason: HOSPADM

## 2023-12-22 RX ORDER — GLYCOPYRROLATE 0.2 MG/ML
INJECTION INTRAMUSCULAR; INTRAVENOUS AS NEEDED
Status: DISCONTINUED | OUTPATIENT
Start: 2023-12-22 | End: 2023-12-22 | Stop reason: SURG

## 2023-12-22 RX ORDER — ONDANSETRON 4 MG/1
4 TABLET, FILM COATED ORAL EVERY 6 HOURS PRN
Status: DISCONTINUED | OUTPATIENT
Start: 2023-12-22 | End: 2023-12-23 | Stop reason: HOSPADM

## 2023-12-22 RX ORDER — ENOXAPARIN SODIUM 100 MG/ML
40 INJECTION SUBCUTANEOUS DAILY
Status: DISCONTINUED | OUTPATIENT
Start: 2023-12-23 | End: 2023-12-23 | Stop reason: HOSPADM

## 2023-12-22 RX ORDER — ONDANSETRON 2 MG/ML
INJECTION INTRAMUSCULAR; INTRAVENOUS AS NEEDED
Status: DISCONTINUED | OUTPATIENT
Start: 2023-12-22 | End: 2023-12-22 | Stop reason: SURG

## 2023-12-22 RX ORDER — LIDOCAINE HYDROCHLORIDE 10 MG/ML
0.5 INJECTION, SOLUTION EPIDURAL; INFILTRATION; INTRACAUDAL; PERINEURAL ONCE AS NEEDED
Status: COMPLETED | OUTPATIENT
Start: 2023-12-22 | End: 2023-12-22

## 2023-12-22 RX ORDER — SODIUM CHLORIDE 9 MG/ML
100 INJECTION, SOLUTION INTRAVENOUS CONTINUOUS
Status: DISCONTINUED | OUTPATIENT
Start: 2023-12-22 | End: 2023-12-23 | Stop reason: HOSPADM

## 2023-12-22 RX ORDER — HYDROMORPHONE HYDROCHLORIDE 1 MG/ML
0.5 INJECTION, SOLUTION INTRAMUSCULAR; INTRAVENOUS; SUBCUTANEOUS
Status: DISCONTINUED | OUTPATIENT
Start: 2023-12-22 | End: 2023-12-23 | Stop reason: HOSPADM

## 2023-12-22 RX ORDER — SODIUM CHLORIDE 9 MG/ML
40 INJECTION, SOLUTION INTRAVENOUS AS NEEDED
Status: DISCONTINUED | OUTPATIENT
Start: 2023-12-22 | End: 2023-12-22 | Stop reason: HOSPADM

## 2023-12-22 RX ORDER — PHENYLEPHRINE HCL IN 0.9% NACL 1 MG/10 ML
SYRINGE (ML) INTRAVENOUS AS NEEDED
Status: DISCONTINUED | OUTPATIENT
Start: 2023-12-22 | End: 2023-12-22 | Stop reason: SURG

## 2023-12-22 RX ORDER — PROPOFOL 10 MG/ML
VIAL (ML) INTRAVENOUS AS NEEDED
Status: DISCONTINUED | OUTPATIENT
Start: 2023-12-22 | End: 2023-12-22 | Stop reason: SURG

## 2023-12-22 RX ORDER — IPRATROPIUM BROMIDE AND ALBUTEROL SULFATE 2.5; .5 MG/3ML; MG/3ML
3 SOLUTION RESPIRATORY (INHALATION)
Status: DISCONTINUED | OUTPATIENT
Start: 2023-12-22 | End: 2023-12-23 | Stop reason: HOSPADM

## 2023-12-22 RX ORDER — ACETAMINOPHEN 325 MG/1
650 TABLET ORAL EVERY 6 HOURS SCHEDULED
Status: DISCONTINUED | OUTPATIENT
Start: 2023-12-22 | End: 2023-12-22

## 2023-12-22 RX ORDER — BUPIVACAINE HYDROCHLORIDE 2.5 MG/ML
INJECTION, SOLUTION EPIDURAL; INFILTRATION; INTRACAUDAL
Status: COMPLETED | OUTPATIENT
Start: 2023-12-22 | End: 2023-12-22

## 2023-12-22 RX ORDER — DIAPER,BRIEF,INFANT-TODD,DISP
1 EACH MISCELLANEOUS EVERY 12 HOURS SCHEDULED
Status: DISCONTINUED | OUTPATIENT
Start: 2023-12-22 | End: 2023-12-23 | Stop reason: HOSPADM

## 2023-12-22 RX ORDER — SODIUM CHLORIDE 0.9 % (FLUSH) 0.9 %
10 SYRINGE (ML) INJECTION EVERY 12 HOURS SCHEDULED
Status: CANCELLED | OUTPATIENT
Start: 2023-12-22

## 2023-12-22 RX ORDER — GABAPENTIN 100 MG/1
100 CAPSULE ORAL EVERY 12 HOURS SCHEDULED
Status: DISCONTINUED | OUTPATIENT
Start: 2023-12-22 | End: 2023-12-23 | Stop reason: HOSPADM

## 2023-12-22 RX ORDER — ASPIRIN 81 MG/1
81 TABLET ORAL DAILY
Status: DISCONTINUED | OUTPATIENT
Start: 2023-12-22 | End: 2023-12-23 | Stop reason: HOSPADM

## 2023-12-22 RX ORDER — DEXAMETHASONE SODIUM PHOSPHATE 10 MG/ML
INJECTION, SOLUTION INTRAMUSCULAR; INTRAVENOUS AS NEEDED
Status: DISCONTINUED | OUTPATIENT
Start: 2023-12-22 | End: 2023-12-22 | Stop reason: SURG

## 2023-12-22 RX ORDER — OXYBUTYNIN CHLORIDE 10 MG/1
10 TABLET, EXTENDED RELEASE ORAL DAILY
Status: DISCONTINUED | OUTPATIENT
Start: 2023-12-23 | End: 2023-12-23 | Stop reason: HOSPADM

## 2023-12-22 RX ORDER — ULTRASOUND COUPLING MEDIUM
GEL (GRAM) TOPICAL AS NEEDED
Status: DISCONTINUED | OUTPATIENT
Start: 2023-12-22 | End: 2023-12-22 | Stop reason: HOSPADM

## 2023-12-22 RX ORDER — HEPARIN SODIUM 5000 [USP'U]/ML
INJECTION, SOLUTION INTRAVENOUS; SUBCUTANEOUS AS NEEDED
Status: DISCONTINUED | OUTPATIENT
Start: 2023-12-22 | End: 2023-12-22 | Stop reason: HOSPADM

## 2023-12-22 RX ORDER — EPHEDRINE SULFATE 50 MG/ML
INJECTION INTRAVENOUS AS NEEDED
Status: DISCONTINUED | OUTPATIENT
Start: 2023-12-22 | End: 2023-12-22 | Stop reason: SURG

## 2023-12-22 RX ORDER — SODIUM CHLORIDE 0.9 % (FLUSH) 0.9 %
10 SYRINGE (ML) INJECTION AS NEEDED
Status: DISCONTINUED | OUTPATIENT
Start: 2023-12-22 | End: 2023-12-22 | Stop reason: HOSPADM

## 2023-12-22 RX ORDER — MIDAZOLAM HYDROCHLORIDE 1 MG/ML
0.5 INJECTION INTRAMUSCULAR; INTRAVENOUS
Status: DISCONTINUED | OUTPATIENT
Start: 2023-12-22 | End: 2023-12-22 | Stop reason: HOSPADM

## 2023-12-22 RX ORDER — FAMOTIDINE 20 MG/1
20 TABLET, FILM COATED ORAL ONCE
Status: COMPLETED | OUTPATIENT
Start: 2023-12-22 | End: 2023-12-22

## 2023-12-22 RX ORDER — FAMOTIDINE 10 MG/ML
20 INJECTION, SOLUTION INTRAVENOUS ONCE
Status: CANCELLED | OUTPATIENT
Start: 2023-12-22 | End: 2023-12-22

## 2023-12-22 RX ORDER — DEXMEDETOMIDINE HYDROCHLORIDE 100 UG/ML
INJECTION, SOLUTION INTRAVENOUS AS NEEDED
Status: DISCONTINUED | OUTPATIENT
Start: 2023-12-22 | End: 2023-12-22 | Stop reason: SURG

## 2023-12-22 RX ORDER — OXYCODONE HYDROCHLORIDE 10 MG/1
10 TABLET ORAL EVERY 6 HOURS PRN
Status: DISCONTINUED | OUTPATIENT
Start: 2023-12-22 | End: 2023-12-23 | Stop reason: HOSPADM

## 2023-12-22 RX ORDER — SODIUM CHLORIDE 9 MG/ML
INJECTION, SOLUTION INTRAVENOUS AS NEEDED
Status: DISCONTINUED | OUTPATIENT
Start: 2023-12-22 | End: 2023-12-22 | Stop reason: HOSPADM

## 2023-12-22 RX ORDER — FENTANYL CITRATE 50 UG/ML
INJECTION, SOLUTION INTRAMUSCULAR; INTRAVENOUS
Status: COMPLETED
Start: 2023-12-22 | End: 2023-12-22

## 2023-12-22 RX ORDER — ROCURONIUM BROMIDE 10 MG/ML
INJECTION, SOLUTION INTRAVENOUS AS NEEDED
Status: DISCONTINUED | OUTPATIENT
Start: 2023-12-22 | End: 2023-12-22 | Stop reason: SURG

## 2023-12-22 RX ORDER — ACETAMINOPHEN 160 MG/5ML
650 SOLUTION ORAL EVERY 6 HOURS SCHEDULED
Status: DISCONTINUED | OUTPATIENT
Start: 2023-12-22 | End: 2023-12-22

## 2023-12-22 RX ORDER — ATORVASTATIN CALCIUM 40 MG/1
40 TABLET, FILM COATED ORAL DAILY
Status: DISCONTINUED | OUTPATIENT
Start: 2023-12-22 | End: 2023-12-23 | Stop reason: HOSPADM

## 2023-12-22 RX ORDER — DEXAMETHASONE SODIUM PHOSPHATE 10 MG/ML
INJECTION, SOLUTION INTRAMUSCULAR; INTRAVENOUS
Status: COMPLETED | OUTPATIENT
Start: 2023-12-22 | End: 2023-12-22

## 2023-12-22 RX ORDER — SODIUM CHLORIDE, SODIUM LACTATE, POTASSIUM CHLORIDE, CALCIUM CHLORIDE 600; 310; 30; 20 MG/100ML; MG/100ML; MG/100ML; MG/100ML
9 INJECTION, SOLUTION INTRAVENOUS CONTINUOUS
Status: DISCONTINUED | OUTPATIENT
Start: 2023-12-22 | End: 2023-12-22

## 2023-12-22 RX ORDER — FENTANYL CITRATE 50 UG/ML
INJECTION, SOLUTION INTRAMUSCULAR; INTRAVENOUS AS NEEDED
Status: DISCONTINUED | OUTPATIENT
Start: 2023-12-22 | End: 2023-12-22 | Stop reason: SURG

## 2023-12-22 RX ORDER — ONDANSETRON 2 MG/ML
4 INJECTION INTRAMUSCULAR; INTRAVENOUS EVERY 6 HOURS PRN
Status: DISCONTINUED | OUTPATIENT
Start: 2023-12-22 | End: 2023-12-23 | Stop reason: HOSPADM

## 2023-12-22 RX ORDER — BUPROPION HYDROCHLORIDE 150 MG/1
150 TABLET ORAL DAILY
Status: DISCONTINUED | OUTPATIENT
Start: 2023-12-22 | End: 2023-12-23 | Stop reason: HOSPADM

## 2023-12-22 RX ORDER — MELOXICAM 15 MG/1
15 TABLET ORAL ONCE
Status: COMPLETED | OUTPATIENT
Start: 2023-12-22 | End: 2023-12-22

## 2023-12-22 RX ORDER — ACETAMINOPHEN 650 MG/1
650 SUPPOSITORY RECTAL EVERY 6 HOURS SCHEDULED
Status: DISCONTINUED | OUTPATIENT
Start: 2023-12-22 | End: 2023-12-22

## 2023-12-22 RX ORDER — LIDOCAINE HYDROCHLORIDE 10 MG/ML
INJECTION, SOLUTION EPIDURAL; INFILTRATION; INTRACAUDAL; PERINEURAL AS NEEDED
Status: DISCONTINUED | OUTPATIENT
Start: 2023-12-22 | End: 2023-12-22 | Stop reason: SURG

## 2023-12-22 RX ORDER — ENALAPRIL MALEATE 10 MG/1
10 TABLET ORAL 2 TIMES DAILY
Status: DISCONTINUED | OUTPATIENT
Start: 2023-12-22 | End: 2023-12-23 | Stop reason: HOSPADM

## 2023-12-22 RX ORDER — FENTANYL CITRATE 50 UG/ML
50 INJECTION, SOLUTION INTRAMUSCULAR; INTRAVENOUS
Status: DISCONTINUED | OUTPATIENT
Start: 2023-12-22 | End: 2023-12-22 | Stop reason: HOSPADM

## 2023-12-22 RX ADMIN — OXYCODONE HYDROCHLORIDE 10 MG: 10 TABLET ORAL at 19:39

## 2023-12-22 RX ADMIN — ROCURONIUM BROMIDE 15 MG: 10 SOLUTION INTRAVENOUS at 11:36

## 2023-12-22 RX ADMIN — ROCURONIUM BROMIDE 15 MG: 10 SOLUTION INTRAVENOUS at 10:34

## 2023-12-22 RX ADMIN — FENTANYL CITRATE 50 MCG: 50 INJECTION, SOLUTION INTRAMUSCULAR; INTRAVENOUS at 12:48

## 2023-12-22 RX ADMIN — ROCURONIUM BROMIDE 10 MG: 10 SOLUTION INTRAVENOUS at 08:11

## 2023-12-22 RX ADMIN — DEXMEDETOMIDINE HYDROCHLORIDE 4 MCG: 100 INJECTION, SOLUTION INTRAVENOUS at 10:44

## 2023-12-22 RX ADMIN — DEXMEDETOMIDINE HYDROCHLORIDE 4 MCG: 100 INJECTION, SOLUTION INTRAVENOUS at 11:13

## 2023-12-22 RX ADMIN — GLYCOPYRROLATE 0.1 MG: 0.4 INJECTION INTRAMUSCULAR; INTRAVENOUS at 07:57

## 2023-12-22 RX ADMIN — DEXMEDETOMIDINE HYDROCHLORIDE 4 MCG: 100 INJECTION, SOLUTION INTRAVENOUS at 08:11

## 2023-12-22 RX ADMIN — Medication 100 MCG: at 07:39

## 2023-12-22 RX ADMIN — ROCURONIUM BROMIDE 15 MG: 10 SOLUTION INTRAVENOUS at 09:53

## 2023-12-22 RX ADMIN — ROCURONIUM BROMIDE 15 MG: 10 SOLUTION INTRAVENOUS at 09:16

## 2023-12-22 RX ADMIN — ROCURONIUM BROMIDE 15 MG: 10 SOLUTION INTRAVENOUS at 08:41

## 2023-12-22 RX ADMIN — DEXMEDETOMIDINE HYDROCHLORIDE 4 MCG: 100 INJECTION, SOLUTION INTRAVENOUS at 08:02

## 2023-12-22 RX ADMIN — PROPOFOL 25 MCG/KG/MIN: 10 INJECTION, EMULSION INTRAVENOUS at 08:17

## 2023-12-22 RX ADMIN — SODIUM CHLORIDE 100 ML/HR: 9 INJECTION, SOLUTION INTRAVENOUS at 19:39

## 2023-12-22 RX ADMIN — DEXMEDETOMIDINE HYDROCHLORIDE 4 MCG: 100 INJECTION, SOLUTION INTRAVENOUS at 09:41

## 2023-12-22 RX ADMIN — ROCURONIUM BROMIDE 50 MG: 10 SOLUTION INTRAVENOUS at 07:36

## 2023-12-22 RX ADMIN — SUGAMMADEX 200 MG: 100 INJECTION, SOLUTION INTRAVENOUS at 11:56

## 2023-12-22 RX ADMIN — GABAPENTIN 600 MG: 300 CAPSULE ORAL at 07:03

## 2023-12-22 RX ADMIN — SODIUM CHLORIDE, POTASSIUM CHLORIDE, SODIUM LACTATE AND CALCIUM CHLORIDE 9 ML/HR: 600; 310; 30; 20 INJECTION, SOLUTION INTRAVENOUS at 07:03

## 2023-12-22 RX ADMIN — BACITRACIN 0.9 G: 500 OINTMENT TOPICAL at 20:10

## 2023-12-22 RX ADMIN — FENTANYL CITRATE 25 MCG: 50 INJECTION, SOLUTION INTRAMUSCULAR; INTRAVENOUS at 10:17

## 2023-12-22 RX ADMIN — ENALAPRIL MALEATE 10 MG: 10 TABLET ORAL at 20:10

## 2023-12-22 RX ADMIN — SODIUM CHLORIDE 2000 MG: 900 INJECTION INTRAVENOUS at 07:41

## 2023-12-22 RX ADMIN — EPHEDRINE SULFATE 5 MG: 50 INJECTION INTRAVENOUS at 07:50

## 2023-12-22 RX ADMIN — SENNOSIDES AND DOCUSATE SODIUM 1 TABLET: 8.6; 5 TABLET ORAL at 20:10

## 2023-12-22 RX ADMIN — IPRATROPIUM BROMIDE AND ALBUTEROL SULFATE 3 ML: 2.5; .5 SOLUTION RESPIRATORY (INHALATION) at 19:59

## 2023-12-22 RX ADMIN — FENTANYL CITRATE 50 MCG: 50 INJECTION, SOLUTION INTRAMUSCULAR; INTRAVENOUS at 13:04

## 2023-12-22 RX ADMIN — ROCURONIUM BROMIDE 15 MG: 10 SOLUTION INTRAVENOUS at 11:04

## 2023-12-22 RX ADMIN — LIDOCAINE HYDROCHLORIDE 50 MG: 10 INJECTION, SOLUTION EPIDURAL; INFILTRATION; INTRACAUDAL; PERINEURAL at 07:36

## 2023-12-22 RX ADMIN — SODIUM CHLORIDE 2000 MG: 900 INJECTION INTRAVENOUS at 11:38

## 2023-12-22 RX ADMIN — DEXAMETHASONE SODIUM PHOSPHATE 4 MG: 10 INJECTION, SOLUTION INTRAMUSCULAR; INTRAVENOUS at 07:43

## 2023-12-22 RX ADMIN — ASPIRIN 81 MG: 81 TABLET, COATED ORAL at 19:28

## 2023-12-22 RX ADMIN — PROPOFOL 200 MG: 10 INJECTION, EMULSION INTRAVENOUS at 07:36

## 2023-12-22 RX ADMIN — SODIUM CHLORIDE 2000 MG: 900 INJECTION INTRAVENOUS at 20:10

## 2023-12-22 RX ADMIN — EPHEDRINE SULFATE 5 MG: 50 INJECTION INTRAVENOUS at 09:26

## 2023-12-22 RX ADMIN — Medication 1 PATCH: at 19:28

## 2023-12-22 RX ADMIN — ATORVASTATIN CALCIUM 40 MG: 40 TABLET, FILM COATED ORAL at 19:28

## 2023-12-22 RX ADMIN — FAMOTIDINE 20 MG: 20 TABLET ORAL at 07:03

## 2023-12-22 RX ADMIN — FENTANYL CITRATE 50 MCG: 50 INJECTION, SOLUTION INTRAMUSCULAR; INTRAVENOUS at 14:08

## 2023-12-22 RX ADMIN — BUPIVACAINE HYDROCHLORIDE 60 ML: 2.5 INJECTION, SOLUTION EPIDURAL; INFILTRATION; INTRACAUDAL; PERINEURAL at 07:38

## 2023-12-22 RX ADMIN — FENTANYL CITRATE 50 MCG: 50 INJECTION, SOLUTION INTRAMUSCULAR; INTRAVENOUS at 07:36

## 2023-12-22 RX ADMIN — MELOXICAM 15 MG: 15 TABLET ORAL at 07:03

## 2023-12-22 RX ADMIN — DEXAMETHASONE SODIUM PHOSPHATE 4 MG: 10 INJECTION, SOLUTION INTRAMUSCULAR; INTRAVENOUS at 07:38

## 2023-12-22 RX ADMIN — DEXMEDETOMIDINE HYDROCHLORIDE 4 MCG: 100 INJECTION, SOLUTION INTRAVENOUS at 09:29

## 2023-12-22 RX ADMIN — LIDOCAINE HYDROCHLORIDE 0.5 ML: 10 INJECTION, SOLUTION EPIDURAL; INFILTRATION; INTRACAUDAL; PERINEURAL at 07:03

## 2023-12-22 RX ADMIN — GABAPENTIN 100 MG: 100 CAPSULE ORAL at 20:10

## 2023-12-22 RX ADMIN — FENTANYL CITRATE 25 MCG: 50 INJECTION, SOLUTION INTRAMUSCULAR; INTRAVENOUS at 08:13

## 2023-12-22 RX ADMIN — ONDANSETRON 4 MG: 2 INJECTION INTRAMUSCULAR; INTRAVENOUS at 11:44

## 2023-12-22 RX ADMIN — BUPROPION HYDROCHLORIDE 150 MG: 150 TABLET, EXTENDED RELEASE ORAL at 19:29

## 2023-12-22 RX ADMIN — DEXMEDETOMIDINE HYDROCHLORIDE 4 MCG: 100 INJECTION, SOLUTION INTRAVENOUS at 07:46

## 2023-12-22 NOTE — ANESTHESIA PROCEDURE NOTES
"Peripheral Block      Patient reassessed immediately prior to procedure    Patient location during procedure: OR  Start time: 12/22/2023 7:36 AM  Reason for block: at surgeon's request and post-op pain management  Performed by  Anesthesiologist: Camacho Tsai MD  Preanesthetic Checklist  Completed: patient identified, IV checked, site marked, risks and benefits discussed, surgical consent, monitors and equipment checked, pre-op evaluation and timeout performed  Prep:  Pt Position: supine  Sterile barriers:cap, gloves, mask and washed/disinfected hands  Prep: ChloraPrep  Patient monitoring: blood pressure monitoring, continuous pulse oximetry and EKG  Procedure    Sedation: yes  Performed under: general  Guidance:ultrasound guided  Images:still images obtained, printed/placed on chart    Laterality:Bilateral  Block Type:TAP  Injection Technique:single-shot  Needle Type:short-bevel and echogenic  Needle Gauge:20 G  Resistance on Injection: none    Medications Used: dexamethasone sodium phosphate injection - Injection   4 mg - 12/22/2023 7:38:00 AM  bupivacaine PF (MARCAINE) 0.25 % injection - Injection   60 mL - 12/22/2023 7:38:00 AM      Medications  Comment:Block Injection:  LA dose divided between Right and Left block        Post Assessment  Injection Assessment: negative aspiration for heme, incremental injection and no paresthesia on injection  Patient Tolerance:comfortable throughout block  Complications:no  Additional Notes    Subcostal TAPs    A high-frequency linear transducer, with sterile cover, was placed sub-xiphoid to identify [place], right and left Rectus Abdominus Muscles (AGNIESZKA). The transducer was moved either right or left subcostally to identify the AGNIESZKA and the Transverse Abdominus Muscle (HESTER). The insertion site was prepped in sterile fashion and then localized with 2-5 ml of 1% Lidocaine. Using ultrasound-guidance, a 20-gauge B-Gonzalez 4\" Ultraplex 360 non-stimulating echogenic needle was " "advanced in plane, from medial to lateral, until the tip of the needle was in the fascial plane between the AGNIESZKA and HESTER. 1-3ml of preservative free normal saline was used to hydro-dissect the fascial planes. After the fascial plane was verified, the local anesthetic (LA) was injected. The procedure was repeated on the opposite side for bilateral coverage. Aspiration every 5 ml to prevent intravascular injection. Injection was completed with negative aspiration of blood and negative intravascular injection. Injection pressures were normal with minimal resistance. The subcostal approach to the TAP nerve block ideally anesthetizes the intercostal nerves T6-T9.     Mid-Axillary/Lateral TAPs    A high-frequency linear transducer, with sterile cover, was placed in the midaxillary line between the ASIS and costal margin. The External Oblique Muscle (EOM), Internal Oblique Muscle (IOM), Transverse Abdominus Muscle (HESTER), and Peritoneum were identified. The insertion site was prepped in sterile fashion and then localized with 2-5 ml of 1% Lidocaine. Using ultrasound-guidance, a 20-gauge B-Gonzalez 4\" Ultraplex 360 non-stimulating echogenic needle was advanced in plane, from medial to lateral, until the tip of the needle was in the fascial plane between the IOM and HESTER. 1-3ml of preservative free normal saline was used to hydro-dissect the fascial planes. After the fascial plane was verified, the local anesthetic (LA) was injected. The procedure was repeated on the opposite side for bilateral coverage. Aspiration every 5 ml to prevent intravascular injection. Injection was completed with negative aspiration of blood and negative intravascular injection. Injection pressures were normal with minimal resistance. Midaxillary TAPs should reach intercostal nerves T10- T11 and the subcostal nerve T12.              "

## 2023-12-22 NOTE — PLAN OF CARE
Goal Outcome Evaluation:  Plan of Care Reviewed With: patient           Outcome Evaluation: patient wakes with verbal stimulation, unable to ambulate on arrival due to excessive somulence and unsteady to walk, mullen anchored Cath care and chg bath completed, clear liquid diet provided fall precautions in effect, family verbalizes understanding, bed alarm on

## 2023-12-22 NOTE — INTERVAL H&P NOTE
[facility] Pre-op    Full history and physical note from office is attached.    VS: /70  HR 70  RR 16  T 97.3  sat 96%RA    Immunizations:  Influenza:  UTD  Pneumococcal:  UTD  Tetanus:  UTD  Covid : x2    LAB Results:  Lab Results   Component Value Date    WBC 6.82 12/15/2023    HGB 16.1 12/15/2023    HCT 48.3 12/15/2023    MCV 88.8 12/15/2023     12/15/2023    NEUTROABS 3.14 08/01/2023    GLUCOSE 119 (H) 12/15/2023    BUN 12 12/15/2023    CREATININE 1.05 12/15/2023    EGFRIFNONA 84 01/12/2022    EGFRIFAFRI 102 01/12/2022     12/15/2023    K 4.8 12/15/2023     12/15/2023    CO2 27.0 12/15/2023    CALCIUM 9.5 12/15/2023    ALBUMIN 3.8 08/01/2023    AST 16 08/01/2023    ALT 15 08/01/2023    BILITOT 0.3 08/01/2023    PTT 31.8 03/07/2017    INR 0.97 03/07/2017     DATE COLLECTED      DATE RECEIVED      DATE REPORTED  09/26/2023 09/26/2023 09/27/2023    DIAGNOSIS:  A.   PROSTATE, NEEDLE CORE BIOPSY, RIGHT TRANS ZONE:  Benign prostate tissue  B.   PROSTATE, NEEDLE CORE BIOPSY, LEFT TRANS ZONE:  Benign prostate tissue  C.   PROSTATE, NEEDLE CORE BIOPSY, RIGHT MID:  Benign prostate tissue  D.   PROSTATE, NEEDLE CORE BIOPSY, RIGHT BASE:  Prostatic adenocarcinoma, Jailyn score 3+3 = 6 (grade group 1),  involving 5% of tissue volume (1/2 cores)  E.   PROSTATE, NEEDLE CORE BIOPSY, RIGHT APEX:  Prostatic adenocarcinoma, Jailyn score 3+4 = 7 (grade group 2),  involving 40% of tissue volume (2/4 cores); <5% pattern 4  F.   PROSTATE, NEEDLE CORE BIOPSY, LEFT MID:  Benign prostate tissue  G.   PROSTATE, NEEDLE CORE BIOPSY, LEFT BASE:  Prostatic adenocarcinoma, Rosebud score 3+3 = 6 (grade group 1),  involving 10% of tissue volume (2/2 cores)  H.   PROSTATE, NEEDLE CORE BIOPSY, LEFT APEX:  Benign prostate tissue    Cancer Staging (if applicable)  Cancer Patient: __ yes __no __unknown__N/A; If yes, clinical stage T:__ N:__M:__, stage group or __N/A      Impression: Prostate  cancer      Plan: ROBOTIC ASSISTED LAPAROSCOPIC PROSTATECTOMY, BILATERAL PELVIC LYMPH NODE DISSECTION       Sofy Julio, APRN   12/22/2023   06:46 EST

## 2023-12-22 NOTE — BRIEF OP NOTE
PROSTATECTOMY LAPAROSCOPIC WITH DAVINCI ROBOT  Progress Note    Michael Bridges  12/22/2023    Pre-op Diagnosis:   Prostate cancer [C61]       Post-Op Diagnosis Codes:     * Prostate cancer [C61]           Procedure(s):  ROBOTIC ASSISTED LAPAROSCOPIC PROSTATECTOMY, BILATERAL PELVIC LYMPH NODE DISSECTION, LEFT PARTIAL NERVE SPARING      Surgeon(s):  Vaibhav Buckner MD    Anesthesia: General    Staff:   Circulator: Elisabeth Cartagena, RAKESH; Vincent Meraz, RAKESH; Belen Noguera RN  Scrub Person: Diana Brown; Awa Fair; Ifeoma Colorado  Assistant: Sheng Schulz PA-C  Assistant: Sheng Schulz PA-C      Estimated Blood Loss: 200ml    Urine Voided: * No values recorded between 12/22/2023  7:30 AM and 12/22/2023 12:03 PM *    Specimens:                Specimens       ID Source Type Tests Collected By Collected At Frozen?    A Pelvis Lymph Node TISSUE PATHOLOGY EXAM   Vaibhav Buckner MD 12/22/23 0902 No    Description: RIGHT PELVIC LYMPH NODE    This specimen was not marked as sent.    B Pelvis Lymph Node TISSUE PATHOLOGY EXAM   Vaibhav Buckner MD 12/22/23 0902 No    Description: LEFT PELVIC LYMPH NODE    This specimen was not marked as sent.    C Pelvis Tissue TISSUE PATHOLOGY EXAM   Vaibhav Buckner MD 12/22/23 0945 No    Description: PERIPROSTHETIC FAT    This specimen was not marked as sent.    D Prostate Tissue TISSUE PATHOLOGY EXAM   Vaibhav Buckner MD 12/22/23 1012 No    Description: MEDIAN LOBE    This specimen was not marked as sent.    E Prostate Tissue TISSUE PATHOLOGY EXAM   Vaibhav Buckner MD 12/22/23 1052 No    Description: LEFT PROSTATE MARGIN    This specimen was not marked as sent.    F Prostate Tissue TISSUE PATHOLOGY EXAM   Vaibhav Buckner MD 12/22/23 0902 No    This specimen was not marked as sent.                  Drains:   Closed/Suction Drain 1 LLQ (Active)   Dressing Status Other (Comment) 12/22/23 1153   Status To bulb suction 12/22/23 1153        Urethral Catheter Latex 20 Fr. (Active)   Collection Container Standard drainage bag 12/22/23 1153   Securement Method Securing device 12/22/23 1153       [REMOVED] Urethral Catheter Latex 18 Fr. (Removed)       Findings: Uncomplicated prostatectomy, right wide dissection, left partial nerve sparing. Water tight anastomosis. ALEKSANDR drain. 20 Fr mullen.         Complications: None    Assistant: Sheng Schulz PA-C  was responsible for performing the following activities: Retraction, Suction, Irrigation, Suturing, Closing, and Placing Dressing and their skilled assistance was necessary for the success of this case.    Vaibhav Buckner MD     Date: 12/22/2023  Time: 12:03 EST

## 2023-12-22 NOTE — ANESTHESIA PREPROCEDURE EVALUATION
Anesthesia Evaluation     Patient summary reviewed and Nursing notes reviewed                Airway   Mallampati: I  TM distance: >3 FB  Neck ROM: full  No difficulty expected  Dental - normal exam   (+) edentulous    Pulmonary - normal exam   (+) a smoker Current, COPD,  Cardiovascular - normal exam    (+) hypertension, CAD, hyperlipidemia      Neuro/Psych  (+) headaches, tremors, numbness  GI/Hepatic/Renal/Endo    (+) GERD, renal disease-, diabetes mellitus    Musculoskeletal     (+) back pain  Abdominal  - normal exam    Bowel sounds: normal.   Substance History - negative use     OB/GYN negative ob/gyn ROS         Other   arthritis,   history of cancer (PROSTATE)                  Anesthesia Plan    ASA 3     general     (TAPS FOR POST OP PAIN)  intravenous induction     Anesthetic plan, risks, benefits, and alternatives have been provided, discussed and informed consent has been obtained with: patient.    Plan discussed with CRNA.    CODE STATUS:

## 2023-12-22 NOTE — OP NOTE
PROSTATECTOMY LAPAROSCOPIC WITH DAVINCI ROBOT  Procedure Report    Patient Name:  Michael Bridges  YOB: 1955    Date of Surgery:  12/22/2023     Indications: The patient is a 68-year-old male who was found to have favorable intermediate risk prostate cancer, after discussion of risks, benefits and alternatives the patient elected to proceed with robotic prostatectomy today.  Informed consent was reviewed and signed.    Pre-op Diagnosis:   Prostate cancer [C61]       Post-Op Diagnosis Codes:     * Prostate cancer [C61]       Procedure(s):  ROBOTIC ASSISTED LAPAROSCOPIC PROSTATECTOMY  BILATERAL PELVIC LYMPH NODE DISSECTION  (LEFT PARTIAL NERVE SPARE, RIGHT WIDE DISSECTION)   HERNANDEZ CATHETER PLACEMENT     Staff:  Surgeon(s):  Vaibhav Buckner MD Lee Graves, MD [facility]rology Resident assisting    Primary Assistant: Sheng Schulz PA-C    Anesthesia: General    Estimated Blood Loss: 200ml    Implants:    Implant Name Type Inv. Item Serial No.  Lot No. LRB No. Used Action   CLIP LIG HEMOLOK PA LG 6CT PRP - [id number] Implant CLIP LIG HEMOLOK PA LG 6CT PRP  [employer]  N/A 1 Implanted   CLIP LIG HEMOLOK PA LG 6CT PRP - [id number] Implant CLIP LIG HEMOLOK PA LG 6CT PRP  [employer] [id number] N/A 3 Implanted   DEV CONTRL TISS STRATAFIX SPIRAL PDS PLS SH 3/0 6IN 15CM ASAEL - [id number] Implant DEV CONTRL TISS STRATAFIX SPIRAL PDS PLS SH 3/0 6IN 15CM ASAEL SMBELB ETHICON  DIV OF J AND J SMBELB N/A 3 Implanted   STPLR TISS [id number] LNG [id number] STRL 1P/U - [id number] Implant STPLR TISS [id number] LNG [id number] STRL 1P/U  [employer]  DIV OF J AND J [id number] N/A 1 Implanted   RELOAD ECHELON ENDOPATH GST 45MM STEFANO - [id number] Implant RELOAD ECHELON ENDOPATH GST 45MM STEFANO  [employer]  DIV OF J AND J [id number] N/A 1 Implanted   KT SEAL HEMOS ABS FLOSEAL MATRX 1.5/FAST/PREP 5000/IU 10ML - [id number] Implant KT SEAL HEMOS ABS FLOSEAL MATRX 1.5/FAST/PREP 5000/IU 10ML  [employer]  N/A 1 Implanted       Specimen:              Drains: 20 Fr mullen catheter      Findings:   1. Uncomplicated robotic prostatectomy LEFT PARTIAL nerve sparing, RIGHT wide dissection   2. Bilateral pelvic lymphadenectomy   3. Water tight urethrovesical anastomosis  4. RLQ ALEKSANDR drain     Complications: NONE    Description of Procedure:     The patient was seen and identified in the pre-operative area. Informed consent was verified after repeat discussion regarding risks and benefits of operation (detailed in pre-op H+P).  He was seen taken to the operating room per anesthesia and laid in supine position on the operating table. SCDs were placed in bilateral lower extremities and were cycling. Pre operative IV Ancef was administered. The patient was intubated and general anesthesia was introduced. Once adequate anesthesia was obtained, he was placed in a low lithotomy position. A brief time-out was performed confirming correct patient, procedure, and laterality. The abdomen and genitals were prepped and draped in sterile fashion.    An 18 Fr mullen catheter was placed in sterile fashion.     The 11 blade scalpel was used to make a stab incision above the umbilicus. The Veress needle was used to gain access above the umbilicus. Drop test confirmed placement and access was obtained without difficulty.  Pneumoperitoneum was increased to 15 mmHg.  The supraumbilical incision was extended and non-bladed 8mm robotic visual camera port was then placed under direct vision. 3 robotic ports and two assistant ports, one 5 mm port in the right upper quadrant and one 12 mm assistant port at the right anterior abdomen were placed under direct vision without difficulty. The 12 mm assistant port was temporarily removed and the Eulalio Lucia device was used to pre-place a closure suture of 0-Vicryl at the assistant port. The 12 mm port was readvanced. The patient was then placed in steep Trendelenburg position. The abdomen was  inspected. The robot was then docked.     Left sigmoid colon pelvic adhesions were taken down sharply to gain adequate exposure.      I began my initial approach which was a posterior dissection, identifying the pouch of Alexys and incising with monopolar scissors 2 cm above the peritoneal reflection above the perirectal fat, we extended this incision for roughly 2 cm bilaterally and identified the seminal vesicles and vas deferens posterior to the prostate.  The left and right vas deferens were skeletonized out laterally and then divided after controlling the basal artery and vessels with the bipolar device.  The seminal vesicle adventitia was divided with blunt and cautery dissection, and the tip of the seminal vesicles bilaterally were identified, the bipolar device was used to cauterize the vessels at the base of the seminal vesicles, making sure to stay right on top of the seminal vesicle and avoid going laterally which would potentially injure the neurovascular bundles.    Next, the transected vas deferens and intact skeletonized seminal vesicles were lifted up with the aid of the fourth arm, I then carried my dissection distally, staying right underneath the prostate and bluntly  off the Denonvilliers' fascia beneath.  I begin my nerve sparing dissection on the patient's left side, sweeping off the neurovascular bundle tissue at the posterior and lateral aspects of the prostate, keeping Denonvilliers' fascia down.  I continued this is far laterally as was possible.  On the patient's right side, I performed wide dissection keeping denonvilliers fascia on the specimen.     Once this was completed, I backed the camera up and begin my standard template bilateral pelvic lymph node dissection, starting on the patient's right side.  The boundaries of the template included the external iliac veins distally to the inguinal ligament and node of [place] and lateral towards the pelvic sidewall.  Hemolock clips  "were placed distal to the node of [place] to prevent lymphocele.  The obturator nerve and vessels were identified and preserved, hemolock clip was placed at the junction of the obturator nodes deep to the external iliac vein to prevent lymphocele. No obturator nerve injury was obvious.  The right pelvic lymph node packet was removed via the assistant port. The same procedure was performed on the patient's left side.    I then identified the vas deferens and removed a piece medial to the iliac vessels. This was performed on both sides. Next, the urachus and medial umbilical ligaments were divided after coagulation with the bipolar [place], and the space of Retzius was entered. The bladder was dropped and intraperitonealized. We then carried down our lateral incisions along the medial umbilical ligaments bilaterally to the previous site where the segment of vas deferens had been ligated and removed.  The superficial dorsal vein was controlled with the bipolar [place] and transected, the prostate was then defatted and this fat was removed and sent as a separate specimen, \"periprostatic fat.\"      The endopelvic fascia was identified and cleared of surrounding attachments. I then opened up the endopelvic fascia sharply on both sides and swept away the levator muscles. This was carried out distally toward the apex of the prostate, identifying and avoiding the urethral sphincter muscle fibers. The junction of the dorsal venous complex and urethra was identified. The puboprostatic ligaments were then divided with cautery, and the dorsal venous complex was isolated and ligated using a vascular stapler via the aid of the assistant.    I then used the monopolar scissor to begin division at the junction of the prostate and bladder neck, longitudinal bladder muscle fibers were confirmed throughout this dissection with avoidance of inadvertently dissecting through prostate tissue.  I eventually dissected down through the " "bladder neck and exposed the Colmenares catheter which was visualized and brought up to the anterior abdominal wall and suspended to anterior abdominal wall with the aid of the 4th robotic arm.  The posterior bladder neck was transected with cautery, avoiding \"button-holing\" of the bladder. This plane was dissected until the previously skeletonized vas deferens and seminal vesicles were identified.  The vas deferens and seminal vesicles were then grabbed with the fourth arm and lifted up. The lateral prostatic pedicles were skeletonized with blunt and light cautery dissection.     Next I performed a high anterior release of the prostatic fascia on the patient's left side with sharp dissection, down through the periprostatic veins, exposing the shiny white prostate capsular tissue beneath.  The tissue was fairly stuck and vascular in this plane so I reoriented the dissection in an extrafascial plane for a partial nerve sparing. This nerve sparing plane was continued with blunt and sharp dissection until the previously created nerve sparing plane performed initially via posterior approach was identified, and these two nerve sparing planes were then .    I then used the [place] to bluntly create a space along the prostatic vascular pedicle staying just off the prostate. The pedicle was then controlled with hemolock clips along its course, and then divided sharply.      On the patient's right side, a wide dissection was performed, with skeletonization of the prostatic pedicle performed and using hemolock clips just off of the prostate to march down the pedicle of the prostate keeping the neurovascular bundle and prostatic fascia on the prostate specimen.  The rectum was retracted to the contralateral side via the assistant during this dissection to avoid rectal injury.    Next, I continued to dissect distally towards the apex of the prostate avoiding injury to the neurovascular bundle on the left side.  The " "rectourethralis attachments were divided along this dissection course.  The urethra proper was identified and cleared laterally and posteriorly with blunt and sharp dissection.  Next I used the monopolar scissors to dissect the staple neurovascular bundle off of the prostate and continued this dissection until the urethra proper was identified distally.      Next, the anterior urethra was transected with sharp dissection until the catheter was visualized. The catheter was then pulled back and the posterior urethra was transected fully.  The final rectourethralis attachments along the posterior urethra plate were transected.  This completely freed up the specimen containing prostate and seminal vesicles. It was placed in an EndoCatch bag. The pelvis was then irrigated out. No injuries were noted. Good hemostasis was visualized.    The vesicourethral anastomosis was then begun.     A 3-0 Stratafix suture was then used to create a reanastomosis of the posterior uretha and bladder plate in \"Cyrus\" suture fashion in figure of 8 fashion.      Our anastomotic stitch, two separate 3-0 V-Stratafix sutures previously looped to one another, was then brought in, both needles were advanced through the posterior bladder neck and then to the urethra at the 6 o oclock positions.  The anastomosis was then performed in a running fashion in both a clockwise and counterclockwise fashion, after gently maneuvering the bladder towards the urethra and tightening the sutures as we went.  Our final catheter was placed, a 20 Ivorian Colmenares catheter with 15 mL of sterile water placed into the balloon.  The anastomosis was tested and proved to be watertight.  The Endo Catch bag was then grasped from the supraumbilical port for extraction.  Next, a Zafar-Muñoz drain was brought in through the right robotic arm port and maneuvered to the distal pelvis.  The drain was secured to the skin with a 2-0 silk suture.    The robot was then undocked.  The " supraumbilical incision was extended with the Bovie and the fascia extended over a finger avoiding bowel injury. The specimen in the Endo Catch bag was removed through this extraction incision above the umbilicus.  The fascia was closed with multiple 0-Vicryl sutures in a figure-of-eight fashion. The rest of the ports were irrigated out and the skin was closed with 4-0 Monocryl in a subcuticular fashion. Dermabond was then applied. The patient tolerated the procedure well. He was extubated and transferred to PACU in stable condition.    DISPOSITION:   The patient will be admitted to [facility] floor for overnight observation with possible discharge in the morning pending tolerance of diet, pain control and ambulation.  ALEKSANDR drain will be removed prior to discharge pending appropriate output.  Colmenares catheter will remain in place for 7 days and the patient will follow back up with me in 1 week for catheter removal and trial of void.    Assistant: Sheng Schulz PA-C  was responsible for performing the following activities: Retraction, Suction, Irrigation, Suturing, Closing, and Placing Dressing and their skilled assistance was necessary for the success of this case.    Vaibhav Buckner MD     Date: 12/22/2023  Time: 12:47 EST

## 2023-12-22 NOTE — ANESTHESIA POSTPROCEDURE EVALUATION
Patient: Michael Bridges    Procedure Summary       Date: 12/22/23 Room / Location:  [facility]  [facility]    Anesthesia Start: 0731 Anesthesia Stop: 1219    Procedure: ROBOTIC ASSISTED LAPAROSCOPIC PROSTATECTOMY, BILATERAL PELVIC LYMPH NODE DISSECTION (Abdomen) Diagnosis:       Prostate cancer      (Prostate cancer [C61])    Surgeons: Vaibhav Buckner MD Provider: Camacho Tsai MD    Anesthesia Type: general ASA Status: 3            Anesthesia Type: general    Vitals  Vitals Value Taken Time   /65 12/22/23 1218   Temp     Pulse 78 12/22/23 1219   Resp     SpO2 99 % 12/22/23 1219   Vitals shown include unfiled device data.        Post Anesthesia Care and Evaluation    Patient location during evaluation: PACU  Patient participation: complete - patient participated  Level of consciousness: awake and alert  Pain management: adequate    Airway patency: patent  Anesthetic complications: No anesthetic complications  PONV Status: none  Cardiovascular status: hemodynamically stable and acceptable  Respiratory status: nonlabored ventilation, acceptable and nasal cannula  Hydration status: acceptable
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
## 2023-12-22 NOTE — ANESTHESIA PROCEDURE NOTES
Airway  Urgency: elective    Date/Time: 12/22/2023 7:39 AM  Airway not difficult    General Information and Staff    Patient location during procedure: OR  CRNA/CAA: Jeffy Rivera CRNA    Indications and Patient Condition  Indications for airway management: airway protection    Preoxygenated: yes  MILS not maintained throughout  Mask difficulty assessment: 2 - vent by mask + OA or adjuvant +/- NMBA    Final Airway Details  Final airway type: endotracheal airway      Successful airway: ETT  Cuffed: yes   Successful intubation technique: direct laryngoscopy  Endotracheal tube insertion site: oral  Blade: Guy  Blade size: 3  ETT size (mm): 7.5  Cormack-Lehane Classification: grade I - full view of glottis  Placement verified by: chest auscultation and capnometry   Cuff volume (mL): 10  Measured from: lips  ETT/EBT  to lips (cm): 22  Number of attempts at approach: 1  Assessment: lips, teeth, and gum same as pre-op and atraumatic intubation    Additional Comments  Negative epigastric sounds, Breath sound equal bilaterally with symmetric chest rise and fall

## 2023-12-23 VITALS
DIASTOLIC BLOOD PRESSURE: 63 MMHG | BODY MASS INDEX: 23.26 KG/M2 | RESPIRATION RATE: 16 BRPM | HEART RATE: 94 BPM | TEMPERATURE: 97.5 F | WEIGHT: 162.48 LBS | HEIGHT: 70 IN | OXYGEN SATURATION: 90 % | SYSTOLIC BLOOD PRESSURE: 132 MMHG

## 2023-12-23 LAB
ANION GAP SERPL CALCULATED.3IONS-SCNC: 8 MMOL/L (ref 5–15)
BUN SERPL-MCNC: 18 MG/DL (ref 8–23)
BUN/CREAT SERPL: 20.7 (ref 7–25)
CALCIUM SPEC-SCNC: 8.6 MG/DL (ref 8.6–10.5)
CHLORIDE SERPL-SCNC: 104 MMOL/L (ref 98–107)
CO2 SERPL-SCNC: 27 MMOL/L (ref 22–29)
CREAT SERPL-MCNC: 0.87 MG/DL (ref 0.76–1.27)
DEPRECATED RDW RBC AUTO: 49.1 FL (ref 37–54)
EGFRCR SERPLBLD CKD-EPI 2021: 94 ML/MIN/1.73
ERYTHROCYTE [DISTWIDTH] IN BLOOD BY AUTOMATED COUNT: 14.6 % (ref 12.3–15.4)
GLUCOSE SERPL-MCNC: 120 MG/DL (ref 65–99)
HCT VFR BLD AUTO: 39.7 % (ref 37.5–51)
HGB BLD-MCNC: 13.2 G/DL (ref 13–17.7)
MCH RBC QN AUTO: 30.1 PG (ref 26.6–33)
MCHC RBC AUTO-ENTMCNC: 33.2 G/DL (ref 31.5–35.7)
MCV RBC AUTO: 90.6 FL (ref 79–97)
PLATELET # BLD AUTO: 212 10*3/MM3 (ref 140–450)
PMV BLD AUTO: 9.5 FL (ref 6–12)
POTASSIUM SERPL-SCNC: 5.2 MMOL/L (ref 3.5–5.2)
RBC # BLD AUTO: 4.38 10*6/MM3 (ref 4.14–5.8)
SODIUM SERPL-SCNC: 139 MMOL/L (ref 136–145)
WBC NRBC COR # BLD AUTO: 12.77 10*3/MM3 (ref 3.4–10.8)

## 2023-12-23 PROCEDURE — 85027 COMPLETE CBC AUTOMATED: CPT | Performed by: STUDENT IN AN ORGANIZED HEALTH CARE EDUCATION/TRAINING PROGRAM

## 2023-12-23 PROCEDURE — 80048 BASIC METABOLIC PNL TOTAL CA: CPT | Performed by: STUDENT IN AN ORGANIZED HEALTH CARE EDUCATION/TRAINING PROGRAM

## 2023-12-23 PROCEDURE — 99024 POSTOP FOLLOW-UP VISIT: CPT | Performed by: STUDENT IN AN ORGANIZED HEALTH CARE EDUCATION/TRAINING PROGRAM

## 2023-12-23 PROCEDURE — 94799 UNLISTED PULMONARY SVC/PX: CPT

## 2023-12-23 PROCEDURE — 25010000002 CEFAZOLIN PER 500 MG: Performed by: STUDENT IN AN ORGANIZED HEALTH CARE EDUCATION/TRAINING PROGRAM

## 2023-12-23 PROCEDURE — 94664 DEMO&/EVAL PT USE INHALER: CPT

## 2023-12-23 PROCEDURE — 25010000002 ENOXAPARIN PER 10 MG: Performed by: STUDENT IN AN ORGANIZED HEALTH CARE EDUCATION/TRAINING PROGRAM

## 2023-12-23 RX ORDER — PHENAZOPYRIDINE HYDROCHLORIDE 200 MG/1
200 TABLET, FILM COATED ORAL 3 TIMES DAILY PRN
Qty: 20 TABLET | Refills: 0 | Status: SHIPPED | OUTPATIENT
Start: 2023-12-23

## 2023-12-23 RX ORDER — POLYETHYLENE GLYCOL 3350 17 G/17G
17 POWDER, FOR SOLUTION ORAL DAILY
Qty: 119 G | Refills: 1 | Status: SHIPPED | OUTPATIENT
Start: 2023-12-23

## 2023-12-23 RX ORDER — OXYCODONE HYDROCHLORIDE 5 MG/1
10 TABLET ORAL EVERY 8 HOURS PRN
Qty: 15 TABLET | Refills: 0 | Status: SHIPPED | OUTPATIENT
Start: 2023-12-23

## 2023-12-23 RX ORDER — METHOCARBAMOL 500 MG/1
500 TABLET, FILM COATED ORAL EVERY 8 HOURS PRN
Qty: 21 TABLET | Refills: 0 | Status: SHIPPED | OUTPATIENT
Start: 2023-12-23 | End: 2023-12-28

## 2023-12-23 RX ORDER — NITROFURANTOIN 25; 75 MG/1; MG/1
100 CAPSULE ORAL 2 TIMES DAILY
Qty: 14 CAPSULE | Refills: 0 | Status: SHIPPED | OUTPATIENT
Start: 2023-12-23 | End: 2023-12-28

## 2023-12-23 RX ORDER — OXYBUTYNIN CHLORIDE 10 MG/1
10 TABLET, EXTENDED RELEASE ORAL DAILY
Qty: 8 TABLET | Refills: 0 | Status: SHIPPED | OUTPATIENT
Start: 2023-12-23

## 2023-12-23 RX ORDER — HYOSCYAMINE SULFATE 0.12 MG/1
1 TABLET SUBLINGUAL EVERY 4 HOURS PRN
Qty: 30 EACH | Refills: 0 | Status: SHIPPED | OUTPATIENT
Start: 2023-12-23 | End: 2023-12-28

## 2023-12-23 RX ADMIN — IPRATROPIUM BROMIDE AND ALBUTEROL SULFATE 3 ML: 2.5; .5 SOLUTION RESPIRATORY (INHALATION) at 07:22

## 2023-12-23 RX ADMIN — BACITRACIN 0.9 G: 500 OINTMENT TOPICAL at 08:28

## 2023-12-23 RX ADMIN — GABAPENTIN 100 MG: 100 CAPSULE ORAL at 08:30

## 2023-12-23 RX ADMIN — OXYBUTYNIN CHLORIDE 10 MG: 10 TABLET, EXTENDED RELEASE ORAL at 08:29

## 2023-12-23 RX ADMIN — ENOXAPARIN SODIUM 40 MG: 100 INJECTION SUBCUTANEOUS at 08:29

## 2023-12-23 RX ADMIN — ASPIRIN 81 MG: 81 TABLET, COATED ORAL at 08:29

## 2023-12-23 RX ADMIN — OXYCODONE HYDROCHLORIDE 10 MG: 10 TABLET ORAL at 04:18

## 2023-12-23 RX ADMIN — ENALAPRIL MALEATE 10 MG: 10 TABLET ORAL at 08:29

## 2023-12-23 RX ADMIN — ATORVASTATIN CALCIUM 40 MG: 40 TABLET, FILM COATED ORAL at 08:29

## 2023-12-23 RX ADMIN — POLYETHYLENE GLYCOL 3350 17 G: 17 POWDER, FOR SOLUTION ORAL at 08:31

## 2023-12-23 RX ADMIN — BUPROPION HYDROCHLORIDE 150 MG: 150 TABLET, EXTENDED RELEASE ORAL at 08:29

## 2023-12-23 RX ADMIN — SENNOSIDES AND DOCUSATE SODIUM 1 TABLET: 8.6; 5 TABLET ORAL at 08:29

## 2023-12-23 RX ADMIN — OXYCODONE HYDROCHLORIDE 10 MG: 10 TABLET ORAL at 11:55

## 2023-12-23 RX ADMIN — SODIUM CHLORIDE 2000 MG: 900 INJECTION INTRAVENOUS at 04:04

## 2023-12-23 NOTE — CASE MANAGEMENT/SOCIAL WORK
Case Management Discharge Note      Final Note: Plan is home with family. Family will transport. No discharge needs identified.         Selected Continued Care - Admitted Since 12/22/2023       Destination    No services have been selected for the patient.                Durable Medical Equipment    No services have been selected for the patient.                Dialysis/Infusion    No services have been selected for the patient.                Home Medical Care    No services have been selected for the patient.                Therapy    No services have been selected for the patient.                Community Resources    No services have been selected for the patient.                Community & DME    No services have been selected for the patient.                         Final Discharge Disposition Code: 01 - home or self-care

## 2023-12-23 NOTE — PROGRESS NOTES
[facility]   Urology Progress Note    Patient Name: Michael Bridges  : 1955  MRN: [id number]  Date of admission: 2023  Surgical Procedures Since Admission:  Procedure(s):  ROBOTIC ASSISTED LAPAROSCOPIC PROSTATECTOMY, BILATERAL PELVIC LYMPH NODE DISSECTION  Surgeon:  Vaibhav Buckner MD  Status:  1 Day Post-Op  -------------------    Subjective   Subjective     Chief Complaint: s/p RALP    History of Present Illness   Pt reports he is doing well.  States pain is well controlled.  Has ambulated around the unit.  Does not think he passed gas but tolerating diet.  No nausea or vomiting.  Urine clear.  No catheter discomfort.   Would like to go home today.         Objective   Objective     Vitals:   Temp:  [97.4 °F (36.3 °C)-98.5 °F (36.9 °C)] 97.8 °F (36.6 °C)  Heart Rate:  [54-81] 65  Resp:  [16-20] 16  BP: ()/(52-73) 115/64  Flow (L/min):  [2-4] 2  Output by Drain (mL) 23 0701 - 23 1900 23 1901 - 23 0700 23 0701 - 23 1014 Range Total   Closed/Suction Drain 1 LLQ 45 100 40 185   Urethral Catheter Latex 20 Fr. 475        Physical Exam  Vitals and nursing note reviewed.   Constitutional:       General: He is awake. He is not in acute distress.     Appearance: Normal appearance. He is well-developed.   HENT:      Head: Normocephalic and atraumatic.      Right Ear: External ear normal.      Left Ear: External ear normal.      Nose: Nose normal.   Eyes:      Conjunctiva/sclera: Conjunctivae normal.   Pulmonary:      Effort: Pulmonary effort is normal.   Abdominal:      General: There is no distension.      Palpations: Abdomen is soft. There is no mass.      Tenderness: There is no abdominal tenderness. There is no right CVA tenderness, left CVA tenderness, guarding or rebound.      Hernia: No hernia is present. There is no hernia in the left inguinal area or right inguinal area.      Comments: Incision c/d/I    ALEKSANDR drain with serosanguinous  output.    Genitourinary:     Pubic Area: No rash.       Penis: Normal.       Testes: Normal.      Rectum: No mass or tenderness. Normal anal tone.      Comments: Mullen in place with clear urine draining   Musculoskeletal:      Cervical back: Normal range of motion.   Lymphadenopathy:      Lower Body: No right inguinal adenopathy. No left inguinal adenopathy.   Skin:     General: Skin is warm.   Neurological:      General: No focal deficit present.      Mental Status: He is alert and oriented to person, place, and time.   Psychiatric:         Behavior: Behavior normal. Behavior is cooperative.           @[facility]@     Result Review    Result Review:  I have personally reviewed the results from the time of this admission to 12/23/2023 10:14 EST and agree with these findings:  [x]  Laboratory  []  Microbiology  []  Radiology  []  EKG/Telemetry   []  Cardiology/Vascular   []  Pathology  []  Old records  []  Other:  Most notable findings include: CBC/ BMP stable     Assessment & Plan   Assessment / Plan     Brief Patient Summary:  Michael Bridges is a 68 y.o. male who underwent RALP.  He has done well and is eager to go home.  He is on chronic pain medication at home (oxycodone) although he reports that he has lost his bottle.  I advised him that I would prescribe him his oxycodone for this week but then he will need to contact his pain management for further medication.  He will need to find his bottle.    Active Hospital Problems:  Active Hospital Problems    Diagnosis     **S/P prostatectomy     Tobacco use disorder     Prostate cancer     BPH (benign prostatic hypertrophy)     Chronic coronary artery disease     Gastroesophageal reflux disease     Hyperlipidemia     Restless legs syndrome     Chronic pain syndrome     Pulmonary emphysema      Plan:    D/C home today   ALEKSANDR drain removed  Instructions given for home.  Will follow up on 12/29/23 for mullen removal.

## 2023-12-23 NOTE — DISCHARGE SUMMARY
Patient Name: Michael Bridges  MRN: [id number]  : 1955  DOS: 2023    Attending: Vaibhav Buckner MD    Primary Care Provider: Diana Priest MD    Date of Admission:.2023  5:58 AM    Date of Discharge:  2023    Discharge Diagnosis:   S/P prostatectomy    Chronic pain syndrome    Chronic coronary artery disease    Gastroesophageal reflux disease    Hyperlipidemia    Pulmonary emphysema    Restless legs syndrome    BPH (benign prostatic hypertrophy)    Prostate cancer    Tobacco use disorder      Hospital Course  At admit:  Patient is a pleasant 68 y.o. male presented for scheduled surgery by .     Per his note: ( Michael Bridges is a 68 y.o. male who presents today for follow up of favorable intermediate risk prostate cancer.  Patient has a history of elevated and rising PSA.  Previously following with Dr. Coe.  He was referred to me recently for evaluation regarding robotic prostatectomy.  He has a history of COPD secondary to tobacco abuse.  Patient is found to have a 47 g prostate, PI-RADS 4 lesion in the right posterior lateral apex, biopsy demonstrated grade group 2 disease in 2 of 4 cores at the right apex, grade group 1 disease in the right base and left base.     He has mild to moderate lower urinary tract symptoms, IPSS 6.  He manages with no alpha-blocker therapy at this time.  States he is normally experiencing a fairly preserved strength of stream.  Appears to be on chronic oxycodone. )     Today he underwent robotic assisted laparoscopic prostatectomy with bilateral pelvic lymph node dissection and left partial nerve sparing right wide dissection and Colmenares catheter placement.  Surgery was done under general anesthesia, was tolerated well.  He was admitted for further management.     Seen in his room postoperatively, still drowsy having received pain medication prior to transfer to the floor.  He is accompanied by his wife and sister in the room.   "Patient's history was reviewed including that of COPD/emphysema due to tobacco abuse.  He is followed by [facility].  He does not have home oxygen or home nebulizer machine.    After admit:  He was provided pain medication as needed for pain control. ASHVIN report on the chart was reviewed.    He received DVT prophylaxis with mechanicals, and Lovenox.     His home medications were resumed as appropriate, he was started on clear liquid diet which was tolerated and advanced to regular diet .    He was provided a bowel regimen and was encouraged to ambulate frequently which he did.    During his stay he had evidence of bowel function return and he tolerated his by mouth diet well.    He had a ALEKSANDR drain postoperatively which has since been removed.      He continues to have a Colmenares catheter in place which he will keep attached to a leg bag until his follow-up appointment with urology.    When I saw him today he is doing quite well and he is ready for discharge home.      Procedures Performed  Procedure(s):  ROBOTIC ASSISTED LAPAROSCOPIC PROSTATECTOMY, BILATERAL PELVIC LYMPH NODE DISSECTION       Pertinent Test Results:    I reviewed the patient's new clinical results.   Results from last 7 days   Lab Units 12/23/23  0522 12/22/23  1835   WBC 10*3/mm3 12.77* 16.51*   HEMOGLOBIN g/dL 13.2 14.7   HEMATOCRIT % 39.7 43.4   PLATELETS 10*3/mm3 212 233     Results from last 7 days   Lab Units 12/23/23  0522 12/22/23  1835   SODIUM mmol/L 139 139   POTASSIUM mmol/L 5.2 4.6   CHLORIDE mmol/L 104 103   CO2 mmol/L 27.0 27.0   BUN mg/dL 18 18   CREATININE mg/dL 0.87 0.94   CALCIUM mg/dL 8.6 8.6   GLUCOSE mg/dL 120* 139*     I reviewed the patient's new imaging including images and reports.            Visit Vitals  /63 (BP Location: Right arm, Patient Position: Lying)   Pulse 94   Temp 97.5 °F (36.4 °C) (Oral)   Resp 16   Ht 177.8 cm (70\")   Wt 73.7 kg (162 lb 7.7 oz)   SpO2 90%   BMI 23.31 kg/m²     Temp " (24hrs), Av °F (36.7 °C), Min:97.4 °F (36.3 °C), Max:98.5 °F (36.9 °C)      General Appearance:    Alert, cooperative, in no acute distress   Lungs:     Clear to auscultation,respirations regular, even and                   unlabored    Heart:    Regular rhythm and normal rate, normal S1 and S2    Abdomen:   Soft, benign, clean incisions.  ALEKSANDR drain, out prior to discharge.  : Colmenares with clear urine   Extremities:   Moves all extremities well, no edema, no cyanosis, no              redness   Pulses:   Pulses palpable and equal bilaterally   Skin:   No bleeding, bruising or rash            Discharge Disposition:Home.         Discharge Medications        New Medications        Instructions Start Date   Hyoscyamine Sulfate SL 0.125 MG sublingual tablet  Commonly known as: Levsin/SL   1 tablet, Sublingual, Every 4 Hours PRN      methocarbamol 500 MG tablet  Commonly known as: ROBAXIN   500 mg, Oral, Every 8 Hours PRN      nitrofurantoin (macrocrystal-monohydrate) 100 MG capsule  Commonly known as: Macrobid   100 mg, Oral, 2 Times Daily      oxybutynin XL 10 MG 24 hr tablet  Commonly known as: DITROPAN-XL   10 mg, Oral, Daily      phenazopyridine 200 MG tablet  Commonly known as: Pyridium   200 mg, Oral, 3 Times Daily PRN             Changes to Medications        Instructions Start Date   bisacodyl 5 MG EC tablet  Commonly known as: bisacodyl  What changed:   how much to take  when to take this  reasons to take this   Use as directed.      Combivent Respimat  MCG/ACT inhaler  Generic drug: ipratropium-albuterol  What changed: See the new instructions.   INHALE 1 PUFF BY MOUTH 4 (FOUR) TIMES A DAY.      omeprazole 40 MG capsule  Commonly known as: priLOSEC  What changed: how to take this   TAKE 1 CAPSULE BY MOUTH ONCE DAILY      oxyCODONE 10 MG tablet  Commonly known as: ROXICODONE  What changed: Another medication with the same name was added. Make sure you understand how and when to take each.   10 mg, Every  8 Hours PRN, TAKES 3 A DAY      oxyCODONE 5 MG immediate release tablet  Commonly known as: Roxicodone  What changed: You were already taking a medication with the same name, and this prescription was added. Make sure you understand how and when to take each.   10 mg, Oral, Every 8 Hours PRN      polyethylene glycol 17 g packet  Commonly known as: MIRALAX  What changed: Another medication with the same name was added. Make sure you understand how and when to take each.   17 g, Oral, Daily      polyethylene glycol 17 GM/SCOOP powder  Commonly known as: MIRALAX  What changed: Another medication with the same name was added. Make sure you understand how and when to take each.   Mix 238g powder with 64 oz of clear liquid. Use as directed.      polyethylene glycol 17 GM/SCOOP powder  Commonly known as: MIRALAX  What changed: You were already taking a medication with the same name, and this prescription was added. Make sure you understand how and when to take each.   Mix one capful (17 g) in 8 ounces of liquid and drink by mouth Daily.             Continue These Medications        Instructions Start Date   aspirin 81 MG EC tablet   81 mg, Oral, Daily      buPROPion  MG 24 hr tablet  Commonly known as: Wellbutrin XL   150 mg, Oral, Daily      enalapril 10 MG tablet  Commonly known as: VASOTEC   10 mg, Oral, 2 Times Daily      flunisolide 25 MCG/ACT (0.025%) solution nasal spray  Commonly known as: NASALIDE   2 sprays, Inhalation, Daily      gabapentin 600 MG tablet  Commonly known as: NEURONTIN   600 mg, Oral, 3 Times Daily      nicotine 21 MG/24HR patch  Commonly known as: Nicoderm CQ   1 patch, Transdermal, Every 24 Hours      sildenafil 20 MG tablet  Commonly known as: REVATIO   100 mg, Oral, Daily, 1 hr prior to sexual activity      simvastatin 40 MG tablet  Commonly known as: ZOCOR   Take 1 tablet by mouth Every Night.      vitamin D3 125 MCG (5000 UT) capsule capsule   5,000 Units, Oral, Daily                Discharge Diet: advance as tolerated. Advised to take it slow next several days.     Activity at Discharge: ambulate. Restrictions per Urology.       Future Appointments   Date Time Provider Department Center   2/13/2024  8:15 AM Diana Priest MD [facility]   5/16/2024  1:40 PM Ramiro Coe MD [facility]   6/18/2024 10:00 AM Maria G Watson APRN [facility]     Urology 12/29/23 for Colmenares removal.       Dragon disclaimer:  Part of this encounter note is an electronic transcription/translation of spoken language to printed text. The electronic translation of spoken language may permit erroneous, or at times, nonsensical words or phrases to be inadvertently transcribed; Although I have reviewed the note for such errors, some may still exist.       Nohelia Dejesus MD  12/23/23  12:12 EST

## 2023-12-23 NOTE — PLAN OF CARE
Goal Outcome Evaluation:           Progress: improving  Outcome Evaluation: VSS, 2L NC, A&Ox4. Colmenares in place, urine clear yellow. IV fluids infusing. IV antibiotics administered. PRNs given for pain. Pt ambulated in the hallway. SCUDS in place. No further concerns at this time.

## 2023-12-23 NOTE — PLAN OF CARE
Goal Outcome Evaluation:  Plan of Care Reviewed With: patient        Progress: improving  Outcome Evaluation: patientalert andoriented pain controlled withj PO painmedication, fall precautions in effect  bed alarm in place, dc ALEKSANDR drain, leg bag and educated on mullen care ambulates wsith help from family eager for DC  VSS

## 2023-12-24 ENCOUNTER — DOCUMENTATION (OUTPATIENT)
Dept: UROLOGY | Facility: CLINIC | Age: 68
End: 2023-12-24
Payer: MEDICARE

## 2023-12-24 NOTE — PROGRESS NOTES
Pt called because of vague right sided abdominal pain.  Reports he was up and down and may have felt something pop.  Reports he is passing gas.  No bowel movement  Catheter draining.  States he feels a tightness over the incision on the right side.  Advised to use warm compress.  Can use muscle relaxer and pain medication.  If pain worsens or there is concern advised to return to ED.

## 2023-12-26 ENCOUNTER — TELEPHONE (OUTPATIENT)
Dept: UROLOGY | Facility: CLINIC | Age: 68
End: 2023-12-26
Payer: MEDICARE

## 2023-12-26 NOTE — TELEPHONE ENCOUNTER
Anabelle, states the incision site is pink and has a scab, and wanted to know if this was fine.  Informed pt that was fine.  Anabelle denies any drainage coming from the incision site.     Informed Amy, some signs of infection at the incision site would be for it to be red, have drainage coming out of it or have pus.  Anabelle voiced understanding.    Patient was prescribed macro and Oxycodone by Dr. Brumfield during the weekend.  Both medication are making pt vomit and nauseous     Dr. Buckner, please advise on alternative to Oxycodone and Macro.  Thank you

## 2023-12-26 NOTE — TELEPHONE ENCOUNTER
Caller: Anabelle Bridges    Relationship: Emergency Contact    Best call back number: [phone]    Which medication are you concerned about: OXYBUTYNIN ?(ANTIBIOTIC) AND OXYCODONE    Who prescribed you this medication: JASWINDERALOINDU    When did you start taking this medication: SUNDAY     What are your concerns: MEDICATION MADE HIM SICK. PLEASE CALL AS SOON AS POSSIBLE. THANK YOU

## 2023-12-26 NOTE — TELEPHONE ENCOUNTER
M for paul, ask her to return our call.    Need to know what questions, paul has regarding patient's incision site.

## 2023-12-26 NOTE — TELEPHONE ENCOUNTER
Caller: Anabelle Bridges    Relationship: Emergency Contact    Best call back number: [phone]    What is the best time to reach you: ANY    Who are you requesting to speak with (clinical staff, provider,  specific staff member): CLINICAL STAFF    Do you know the name of the person who called: SHANTE    What was the call regarding: PATIENT'S INCISION      ---UNABLE TO WARM TRANSFER

## 2023-12-26 NOTE — TELEPHONE ENCOUNTER
Caller: Anabelle Bridges    Relationship: Emergency Contact    Best call back number: [phone] (home)     Who are you requesting to speak with (clinical staff, provider,  specific staff member): SHANTE    Do you know the name of the person who called: SHANTE    What was the call regarding: QUESTIONS ABOUT INCISION SITE. PT WIFE CALLING BACK AGAIN, UNABLE TO REACH CLINICAL TEAM, PLEASE RETURN PT WIFE'S CALL.

## 2023-12-26 NOTE — TELEPHONE ENCOUNTER
Patients spouse called in to schedule 1 wk post op / cath removal. She had questions regarding patients incision site. Please give Anabelle a call back at [phone].

## 2023-12-27 LAB
CYTO UR: NORMAL
LAB AP CASE REPORT: NORMAL
LAB AP CLINICAL INFORMATION: NORMAL
LAB AP SPECIAL STAINS: NORMAL
PATH REPORT.FINAL DX SPEC: NORMAL
PATH REPORT.GROSS SPEC: NORMAL

## 2023-12-27 NOTE — TELEPHONE ENCOUNTER
Informed Anabelle of the following per Dr. Buckner:    I would have the patient hold off on any narcotic and just take Tylenol or Ibuprofen, he shouldn't need narcotic this far out from procedure. Hold taking macrobid until the day before his follow up for catheter removal (take macrobid Thursday and Friday).      Anabelle, voiced understanding.   Will have pt take macrobid, but if it still makes pt sick she will let us know.

## 2023-12-28 ENCOUNTER — OFFICE VISIT (OUTPATIENT)
Dept: UROLOGY | Facility: CLINIC | Age: 68
End: 2023-12-28
Payer: MEDICARE

## 2023-12-28 VITALS — BODY MASS INDEX: 23.19 KG/M2 | WEIGHT: 162 LBS | HEIGHT: 70 IN | HEART RATE: 70 BPM | OXYGEN SATURATION: 97 %

## 2023-12-28 DIAGNOSIS — C61 PROSTATE CANCER: Primary | ICD-10-CM

## 2023-12-28 DIAGNOSIS — L08.9 SKIN INFECTION: ICD-10-CM

## 2023-12-28 PROCEDURE — 99024 POSTOP FOLLOW-UP VISIT: CPT | Performed by: STUDENT IN AN ORGANIZED HEALTH CARE EDUCATION/TRAINING PROGRAM

## 2023-12-28 RX ORDER — AMOXICILLIN AND CLAVULANATE POTASSIUM 875; 125 MG/1; MG/1
1 TABLET, FILM COATED ORAL 2 TIMES DAILY
Qty: 6 TABLET | Refills: 0 | Status: SHIPPED | OUTPATIENT
Start: 2023-12-28

## 2023-12-28 NOTE — PROGRESS NOTES
Follow Up Office Visit      Patient Name: Michael Bridges  : 1955   MRN: [id number]     Chief Complaint:    Chief Complaint   Patient presents with    catheter removal       Referring Provider: No ref. provider found    History of Present Illness: Michael Bridges is a 68 y.o. male who presents today for follow up of prostate cancer.  The patient was diagnosed with low-volume grade group 2 and grade group 1 disease at the right base and left base of the prostate.  History of COPD.  He elected to proceed with robotic prostatectomy.  Patient returns today for postop follow-up.  Underwent a prostatectomy on 2023.  Procedure was uncomplicated.  He has had return of bowel function.  He is ambulating at home.  He has mild incisional pain.  He has some erythema and mild serous drainage around the left ALEKSANDR drain site.  He states the antibiotic that was provided, Macrobid caused him nausea and vomiting.  We will transition to Augmentin.    Patient surgical pathology reviewed below.    Final Diagnosis   LYMPH NODE, RIGHT PELVIC, BIOPSIES:  Two benign lymph nodes (0/2).  2.   LYMPH NODES, LEFT PELVIC, DISSECTION:  Three benign lymph nodes (0/3).  3.   SOFT TISSUE, PERIPROSTATIC FAT, EXCISION:  One benign lymph node (0/1) and mature fibroadipose tissue  Negative for malignancy  4.   PROSTATE, MEDIAN LOBE, EXCISION:  Benign prostatic tissue  5.   LEFT PROSTATE MARGIN, EXCISION:  Benign fibroadipose tissue  Negative for malignancy  6.   RADICAL PROSTATECTOMY:  Prostatic adenocarcinoma     PROSTATE RADICAL/ENUCLEATION  TYPE OF SPECIMEN/PROCEDURE: Radical prostatectomy  HISTOLOGIC TYPE: Acinar adenocarcinoma, conventional (usual)  HISTOLOGIC GRADE: Grade group 3 ([place] score 4 + 3 = 7)  TERTIARY PATTERN 5 IN OVERALL JULIOCESAR SCORE 7: Not identified  TUMOR QUANTITATION: 6-10%  INTRADUCTAL CARCINOMA: Not identified  CRIBRIFORM GLANDS PRESENT: Present  EXTRAPROSTATIC EXTENSION: Not identified  URINARY  BLADDER NECK INVASION: Not identified  SEMINAL VESICLE INVASION: Not identified  VASCULAR/LYMPHVASCULAR INVASION: Not identified  MARGINS: Invasive carcinoma present at margin  FOCALITY: Unifocal, estimated to be approximately < 3 mm, in blocks 6J and 6M  LOCATION OF POSITIVE MARGIN: Left mid  TREATMENT EFFECT: No known presurgical therapy  REGIONAL LYMPH NODES: All regional lymph nodes negative for tumor  NUMBER OF LYMPH NODES EXAMINED: 6  AJCC PATHOLOGIC STAGE:  (COMPLETED BY PATHOLOGIST, BASED ONLY ON TISSUE FINDINGS, MORE EXTENSIVE DISEASE MAY NOT BE KNOWN TO THE PATHOLOGIST)  pT=   pT2  pN= pN0     Patient is noted to have upgrading to grade group 3 disease, fairly small volume however cribriform glands are present in addition to a left positive margin, fairly small margin on the left side.  Lymph nodes were negative.  [id number].     Subjective      Review of System: Review of Systems   Gastrointestinal:  Positive for abdominal pain.      I have reviewed the ROS documented by my clinical staff, I have updated appropriately and I agree. Vaibhav Buckner MD    I have reviewed and the following portions of the patient's history were updated as appropriate: past family history, past medical history, past social history, past surgical history and problem list.    Medications:     Current Outpatient Medications:     aspirin 81 MG EC tablet, Take 1 tablet by mouth Daily., Disp: 90 tablet, Rfl: 11    bisacodyl 5 MG EC tablet, Use as directed. (Patient taking differently: 1 tablet Daily As Needed for Constipation. Use as directed.), Disp: 4 tablet, Rfl: 0    buPROPion XL (Wellbutrin XL) 150 MG 24 hr tablet, Take 1 tablet by mouth Daily., Disp: 30 tablet, Rfl: 2    Combivent Respimat  MCG/ACT inhaler, INHALE 1 PUFF BY MOUTH 4 (FOUR) TIMES A DAY. (Patient taking differently: 1 puff 4 (Four) Times a Day.), Disp: 4 g, Rfl: 10    enalapril (VASOTEC) 10 MG tablet, Take 1 tablet by mouth 2 (Two) Times a Day., Disp: ,  Rfl: 5    flunisolide (NASALIDE) 25 MCG/ACT (0.025%) solution nasal spray, Inhale 2 sprays Daily., Disp: 1 bottle, Rfl: 5    gabapentin (NEURONTIN) 600 MG tablet, Take 1 tablet by mouth 3 (Three) Times a Day., Disp: 90 tablet, Rfl: 0    nicotine (Nicoderm CQ) 21 MG/24HR patch, Place 1 patch on the skin as directed by provider Daily. (Patient taking differently: Place 1 patch on the skin as directed by provider Daily. NOT CURRENTLY TAKING), Disp: 28 each, Rfl: 2    omeprazole (priLOSEC) 40 MG capsule, TAKE 1 CAPSULE BY MOUTH ONCE DAILY (Patient taking differently: 1 capsule Daily.), Disp: 90 capsule, Rfl: 0    oxybutynin XL (DITROPAN-XL) 10 MG 24 hr tablet, Take 1 tablet by mouth Daily., Disp: 8 tablet, Rfl: 0    oxyCODONE (ROXICODONE) 10 MG tablet, 1 tablet Every 8 (Eight) Hours As Needed for Moderate Pain. TAKES 3 A DAY, Disp: , Rfl:     oxyCODONE (Roxicodone) 5 MG immediate release tablet, Take 2 tablets by mouth Every 8 (Eight) Hours As Needed for Severe Pain., Disp: 15 tablet, Rfl: 0    phenazopyridine (Pyridium) 200 MG tablet, Take 1 tablet by mouth 3 (Three) Times a Day As Needed for Bladder Spasms., Disp: 20 tablet, Rfl: 0    polyethylene glycol (MIRALAX) 17 g packet, Take 17 g by mouth Daily. (Patient taking differently: Take 17 g by mouth Daily As Needed (CONSTIPATION).), Disp: 30 packet, Rfl: 11    polyethylene glycol (MIRALAX) 17 GM/SCOOP powder, Mix 238g powder with 64 oz of clear liquid. Use as directed., Disp: 238 g, Rfl: 0    polyethylene glycol (MIRALAX) 17 GM/SCOOP powder, Mix one capful (17 g) in 8 ounces of liquid and drink by mouth Daily., Disp: 119 g, Rfl: 1    sildenafil (REVATIO) 20 MG tablet, Take 5 tablets by mouth Daily. 1 hr prior to sexual activity, Disp: 90 tablet, Rfl: 4    simvastatin (ZOCOR) 40 MG tablet, Take 1 tablet by mouth Every Night., Disp: , Rfl:     vitamin D3 125 MCG (5000 UT) capsule capsule, Take 1 capsule by mouth Daily., Disp: 30 capsule, Rfl: 11     "amoxicillin-clavulanate (AUGMENTIN) 875-125 MG per tablet, Take 1 tablet by mouth 2 (Two) Times a Day., Disp: 6 tablet, Rfl: 0    Current Facility-Administered Medications:     cyanocobalamin injection 1,000 mcg, 1,000 mcg, Intramuscular, Weekly, Diana Priest MD, 1,000 mcg at 10/11/23 1027    Allergies:   Allergies   Allergen Reactions    Chantix [Varenicline] Other (See Comments)     Patient c/o suicidal thoughts    Contrast Dye (Echo Or Unknown Ct/Mr) Hives    Darvon [Propoxyphene] Other (See Comments)     MADE LEFT SIDE \"NUMB\"    Tramadol Hives    Acetaminophen-Codeine Nausea Only     sweat    Hydrocodone-Acetaminophen Itching and Rash    Iodinated Contrast Media Nausea And Vomiting    Morphine And Related Other (See Comments)     Sweat and can't urinate    Tylenol [Acetaminophen] Nausea Only     Tylenol with Codeine #3 TABS. Also apparently reports breakout/rash           Objective     Physical Exam:   Vital Signs:   Vitals:    12/28/23 0757   Pulse: 70   SpO2: 97%   Weight: 73.5 kg (162 lb)   Height: 177.8 cm (70\")   PainSc:   2     Body mass index is 23.24 kg/m².     Physical Exam  Vitals and nursing note reviewed.   Constitutional:       Appearance: Normal appearance.   HENT:      Head: Normocephalic.      Nose: Nose normal.      Mouth/Throat:      Mouth: Mucous membranes are moist.   Eyes:      Extraocular Movements: Extraocular movements intact.   Pulmonary:      Effort: Pulmonary effort is normal. No respiratory distress.   Abdominal:      General: There is no distension.      Tenderness: There is no abdominal tenderness.      Hernia: No hernia is present.      Comments: Robotic port site incisions well approximated with skin glue, no erythema or purulence, no drainage or hernia present   Genitourinary:     Comments:  circumcised phallus, orthotopic meatus, bilaterally descended testicles without masses or induration, Colmenares catheter in place draining clear yellow urine  Musculoskeletal:         " General: Normal range of motion.      Right lower leg: No edema.      Left lower leg: No edema.   Skin:     General: Skin is warm and dry.   Neurological:      General: No focal deficit present.      Mental Status: He is alert and oriented to person, place, and time. Mental status is at baseline.   Psychiatric:         Mood and Affect: Mood normal.         Behavior: Behavior normal.         Labs:   Brief Urine Lab Results  (Last result in the past 365 days)        Color   Clarity   Blood   Leuk Est   Nitrite   Protein   CREAT   Urine HCG        11/01/23 1037 Yellow   Clear   1+   Negative   Negative   Negative                        Lab Results   Component Value Date    GLUCOSE 120 (H) 12/23/2023    CALCIUM 8.6 12/23/2023     12/23/2023    K 5.2 12/23/2023    CO2 27.0 12/23/2023     12/23/2023    BUN 18 12/23/2023    CREATININE 0.87 12/23/2023    EGFRIFAFRI 102 01/12/2022    EGFRIFNONA 84 01/12/2022    BCR 20.7 12/23/2023    ANIONGAP 8.0 12/23/2023       Lab Results   Component Value Date    WBC 12.77 (H) 12/23/2023    HGB 13.2 12/23/2023    HCT 39.7 12/23/2023    MCV 90.6 12/23/2023     12/23/2023       Images:   XR Chest PA & Lateral    Result Date: 12/18/2023  Impression: 1. No acute process. 2. Emphysema. Electronically Signed: Chidi Frye MD  12/18/2023 10:01 AM EST  Workstation ID: [id number]      Measures:   Tobacco:   Michael Ned Bridges  reports that he has been smoking cigarettes. He has a 57.00 pack-year smoking history. He has been exposed to tobacco smoke. He has quit using smokeless tobacco.  His smokeless tobacco use included chew and snuff.    Assessment / Plan      Assessment/Plan:   68 y.o. male who presented today for follow up of prostate cancer.  Status post prostatectomy last week.  Upgrading noted to grade group 3, has a left positive margin.  We will carefully monitor his PSA, I would like to see the patient back in 5 weeks with PSA prior.  We discussed the potential need  for salvage XRT pending his PSA trend.  We discussed the higher risk of metastases as a result of upgrading, cribriform glands and positive margin.  Discussed lifting restrictions, Kegel exercise information provided.  Catheter was removed today and the patient passed his voiding trial without issue.  Strict return precautions discussed for infection signs, urinary retention, nausea vomiting/bowel issues.  He reports understanding.    Diagnoses and all orders for this visit:    1. Prostate cancer (Primary)  -     PSA Diagnostic; Future    2. Skin infection  -     amoxicillin-clavulanate (AUGMENTIN) 875-125 MG per tablet; Take 1 tablet by mouth 2 (Two) Times a Day.  Dispense: 6 tablet; Refill: 0           Follow Up:   Return in about 1 month (around 1/30/2024).    I spent approximately 30 minutes providing clinical care for this patient; including review of patient's chart and provider documentation, face to face time spent with patient in examination room (obtaining history, performing physical exam, discussing diagnosis and management options), placing orders, and completing patient documentation.     Vaibhav Buckner MD  [facility] [place]

## 2024-01-15 ENCOUNTER — TELEPHONE (OUTPATIENT)
Dept: UROLOGY | Facility: CLINIC | Age: 69
End: 2024-01-15
Payer: MEDICARE

## 2024-01-15 ENCOUNTER — PRIOR AUTHORIZATION (OUTPATIENT)
Dept: UROLOGY | Facility: CLINIC | Age: 69
End: 2024-01-15
Payer: MEDICARE

## 2024-01-15 DIAGNOSIS — R10.84 GENERALIZED ABDOMINAL PAIN: Primary | ICD-10-CM

## 2024-01-15 DIAGNOSIS — N50.812 TESTICULAR PAIN, LEFT: ICD-10-CM

## 2024-01-15 RX ORDER — METHOCARBAMOL 500 MG/1
500 TABLET, FILM COATED ORAL 2 TIMES DAILY
Qty: 30 TABLET | Refills: 0 | Status: SHIPPED | OUTPATIENT
Start: 2024-01-15

## 2024-01-15 RX ORDER — SULFAMETHOXAZOLE AND TRIMETHOPRIM 800; 160 MG/1; MG/1
1 TABLET ORAL 2 TIMES DAILY
Qty: 14 TABLET | Refills: 0 | Status: SHIPPED | OUTPATIENT
Start: 2024-01-15

## 2024-01-15 NOTE — TELEPHONE ENCOUNTER
Provider: DR SUMMERS    Caller: ALEAH EDWARD    Relationship to Patient: SPOUSE    Pharmacy: [facility]     Phone Number: [phone]    Reason for Call: PT CALLED TO CHECK ON STATUS OF MEDICATIONS BEING CALLED INTO THE PHARMACY    PT SPOKE WITH DR SUMMERS LAST NIGHT 1/14/24. DR SUMMERS WAS GOING TO CALL IN ANTIBIOTIC AND MUSCLE RELAXER    When was the patient last seen: 12/28/23    PLEASE CALL TO CONFIRM MEDS HAVE BEEN SENT.

## 2024-01-15 NOTE — TELEPHONE ENCOUNTER
Called and LVM for Anabelle that Dr. Buckner had sent in the abx and the muscle relxer to the [facility] in [place]. Left CB number if she had any questions.

## 2024-01-16 RX ORDER — CHOLECALCIFEROL TAB 125 MCG (5000 UNIT) 125 MCG (5000 UT)
125 TAB DAILY
Qty: 30 TABLET | Refills: 10 | OUTPATIENT
Start: 2024-01-16

## 2024-01-17 ENCOUNTER — HOSPITAL ENCOUNTER (OUTPATIENT)
Dept: CT IMAGING | Facility: HOSPITAL | Age: 69
Discharge: HOME | End: 2024-01-17
Admitting: NURSE PRACTITIONER
Payer: MEDICARE

## 2024-01-17 DIAGNOSIS — F17.210 CIGARETTE NICOTINE DEPENDENCE WITHOUT COMPLICATION: ICD-10-CM

## 2024-01-17 PROCEDURE — 71271 CT THORAX LUNG CANCER SCR C-: CPT

## 2024-01-19 ENCOUNTER — TELEPHONE (OUTPATIENT)
Dept: UROLOGY | Facility: CLINIC | Age: 69
End: 2024-01-19
Payer: MEDICARE

## 2024-01-19 NOTE — TELEPHONE ENCOUNTER
PT REACHED BACK OUT TO TRY AND SPEAK WITH NURSE, LET THEM KNOW A NURSE WILL REACH BACK OUT. HE IS UNABLE TO MAKE A BILE MOVEMENT

## 2024-01-19 NOTE — TELEPHONE ENCOUNTER
Caller: CLARENCE EDWARD    Relationship: SPOUSE    Best call back number: [phone]    What is your medical concern? WANTING TO KNOW IF PT CAN TAKE LIQUID DULCOLAX.

## 2024-01-19 NOTE — TELEPHONE ENCOUNTER
Patient called in today, states he is a bit constipated.  He had a small bowel movement yesterday, he had some questions about what he could take for constipation.  I discussed MiraLAX, Senokot, milk of magnesia or Dulcolax liquid or all fine.  They have Dulcolax liquid at home they will try.  Recommend suppository if still constipated or magnesium citrate.  He denies significant bloating, nausea, emesis, chest pain or shortness of air.  Return precautions to the emergency department discussed but it sounds like he is having a little constipation.  He will keep us updated on his symptoms.    Vaibhav Buckner MD

## 2024-01-23 DIAGNOSIS — E55.9 VITAMIN D DEFICIENCY: ICD-10-CM

## 2024-01-23 NOTE — TELEPHONE ENCOUNTER
Caller: Anabelle Bridges    Relationship: Emergency Contact    Best call back number: [phone]    Requested Prescriptions:   Requested Prescriptions     Pending Prescriptions Disp Refills    vitamin D3 125 MCG (5000 UT) capsule capsule 30 capsule 11     Sig: Take 1 capsule by mouth Daily.        Pharmacy where request should be sent: [facility] ([place]) [address], [address] - [phone]  - [phone] FX     Last office visit with prescribing clinician: 12/7/2023   Last telemedicine visit with prescribing clinician: Visit date not found   Next office visit with prescribing clinician: 2/13/2024     Additional details provided by patient:     Does the patient have less than a 3 day supply:  [x] Yes  [] No    Duke Chambers Rep   01/23/24 09:11 EST

## 2024-01-25 ENCOUNTER — LAB (OUTPATIENT)
Dept: LAB | Facility: HOSPITAL | Age: 69
End: 2024-01-25
Payer: MEDICARE

## 2024-01-25 DIAGNOSIS — C61 PROSTATE CANCER: ICD-10-CM

## 2024-01-25 LAB — PSA SERPL-MCNC: 0.03 NG/ML (ref 0–4)

## 2024-01-25 PROCEDURE — 36415 COLL VENOUS BLD VENIPUNCTURE: CPT

## 2024-01-25 PROCEDURE — 84153 ASSAY OF PSA TOTAL: CPT

## 2024-01-29 ENCOUNTER — OFFICE VISIT (OUTPATIENT)
Dept: UROLOGY | Facility: CLINIC | Age: 69
End: 2024-01-29
Payer: MEDICARE

## 2024-01-29 VITALS
WEIGHT: 156 LBS | SYSTOLIC BLOOD PRESSURE: 128 MMHG | DIASTOLIC BLOOD PRESSURE: 74 MMHG | OXYGEN SATURATION: 96 % | BODY MASS INDEX: 22.33 KG/M2 | HEIGHT: 70 IN | HEART RATE: 69 BPM

## 2024-01-29 DIAGNOSIS — C61 PROSTATE CANCER: Primary | ICD-10-CM

## 2024-01-29 PROCEDURE — 1160F RVW MEDS BY RX/DR IN RCRD: CPT | Performed by: PHYSICIAN ASSISTANT

## 2024-01-29 PROCEDURE — 1159F MED LIST DOCD IN RCRD: CPT | Performed by: PHYSICIAN ASSISTANT

## 2024-01-29 PROCEDURE — 99024 POSTOP FOLLOW-UP VISIT: CPT | Performed by: PHYSICIAN ASSISTANT

## 2024-01-29 NOTE — PROGRESS NOTES
Follow Up Office Visit      Patient Name: Michael Bridges  : 1955   MRN: [id number]     Chief Complaint:    Chief Complaint   Patient presents with    Prostate Cancer       Referring Provider: No ref. provider found    History of Present Illness: Michael Bridges is a 68 y.o. male who presents today for postoperative visit with PSA.  Status post uncomplicated robotic assisted radical prostatectomy with left partial nerve sparing and pelvic lymph node dissection on 2023.  Grade group 3 disease and left positive margin.    Reports doing well.  No further incisional pain.  Prior erythema around the left ALEKSANDR drain site has now resolved.  He has essentially returned to normal activities of daily living.  Denies any issues with voiding, dysuria, gross hematuria.  Endorses stress urinary incontinence symptoms/leakage as expected.      IPSS 4    Lab Results   Component Value Date    PSA 0.031 2024    PSA 15.400 (H) 2023    PSA 10.300 (H) 2022    PSA 7.090 (H) 2022    PSA 8.030 (H) 2022         Subjective      Review of System: Review of Systems   Genitourinary:  Negative for decreased urine volume, difficulty urinating, dysuria, enuresis, flank pain, frequency, hematuria and urgency.      I have reviewed the ROS documented by my clinical staff, I have updated appropriately and I agree. Sheng Schulz PA-C    I have reviewed and the following portions of the patient's history were updated as appropriate: past family history, past medical history, past social history, past surgical history and problem list.    Medications:     Current Outpatient Medications:     amoxicillin-clavulanate (AUGMENTIN) 875-125 MG per tablet, Take 1 tablet by mouth 2 (Two) Times a Day., Disp: 6 tablet, Rfl: 0    aspirin 81 MG EC tablet, Take 1 tablet by mouth Daily., Disp: 90 tablet, Rfl: 11    bisacodyl 5 MG EC tablet, Use as directed. (Patient taking differently: 1 tablet Daily As Needed  for Constipation. Use as directed.), Disp: 4 tablet, Rfl: 0    buPROPion XL (Wellbutrin XL) 150 MG 24 hr tablet, Take 1 tablet by mouth Daily., Disp: 30 tablet, Rfl: 2    Combivent Respimat  MCG/ACT inhaler, INHALE 1 PUFF BY MOUTH 4 (FOUR) TIMES A DAY. (Patient taking differently: 1 puff 4 (Four) Times a Day.), Disp: 4 g, Rfl: 10    enalapril (VASOTEC) 10 MG tablet, Take 1 tablet by mouth 2 (Two) Times a Day., Disp: , Rfl: 5    flunisolide (NASALIDE) 25 MCG/ACT (0.025%) solution nasal spray, Inhale 2 sprays Daily., Disp: 1 bottle, Rfl: 5    gabapentin (NEURONTIN) 600 MG tablet, Take 1 tablet by mouth 3 (Three) Times a Day., Disp: 90 tablet, Rfl: 0    methocarbamol (ROBAXIN) 500 MG tablet, Take 1 tablet by mouth 2 (Two) Times a Day., Disp: 30 tablet, Rfl: 0    nicotine (Nicoderm CQ) 21 MG/24HR patch, Place 1 patch on the skin as directed by provider Daily. (Patient taking differently: Place 1 patch on the skin as directed by provider Daily. NOT CURRENTLY TAKING), Disp: 28 each, Rfl: 2    omeprazole (priLOSEC) 40 MG capsule, TAKE 1 CAPSULE BY MOUTH ONCE DAILY (Patient taking differently: 1 capsule Daily.), Disp: 90 capsule, Rfl: 0    oxybutynin XL (DITROPAN-XL) 10 MG 24 hr tablet, Take 1 tablet by mouth Daily., Disp: 8 tablet, Rfl: 0    oxyCODONE (ROXICODONE) 10 MG tablet, 1 tablet Every 8 (Eight) Hours As Needed for Moderate Pain. TAKES 3 A DAY, Disp: , Rfl:     phenazopyridine (Pyridium) 200 MG tablet, Take 1 tablet by mouth 3 (Three) Times a Day As Needed for Bladder Spasms., Disp: 20 tablet, Rfl: 0    polyethylene glycol (MIRALAX) 17 g packet, Take 17 g by mouth Daily. (Patient taking differently: Take 17 g by mouth Daily As Needed (CONSTIPATION).), Disp: 30 packet, Rfl: 11    polyethylene glycol (MIRALAX) 17 GM/SCOOP powder, Mix 238g powder with 64 oz of clear liquid. Use as directed., Disp: 238 g, Rfl: 0    polyethylene glycol (MIRALAX) 17 GM/SCOOP powder, Mix one capful (17 g) in 8 ounces of liquid and  "drink by mouth Daily., Disp: 119 g, Rfl: 1    sildenafil (REVATIO) 20 MG tablet, Take 5 tablets by mouth Daily. 1 hr prior to sexual activity, Disp: 90 tablet, Rfl: 4    simvastatin (ZOCOR) 40 MG tablet, Take 1 tablet by mouth Every Night., Disp: , Rfl:     vitamin D3 125 MCG (5000 UT) capsule capsule, Take 1 capsule by mouth Daily., Disp: 30 capsule, Rfl: 11    oxyCODONE (Roxicodone) 5 MG immediate release tablet, Take 2 tablets by mouth Every 8 (Eight) Hours As Needed for Severe Pain. (Patient not taking: Reported on 1/29/2024), Disp: 15 tablet, Rfl: 0    sulfamethoxazole-trimethoprim (Bactrim DS) 800-160 MG per tablet, Take 1 tablet by mouth 2 (Two) Times a Day. (Patient not taking: Reported on 1/29/2024), Disp: 14 tablet, Rfl: 0    Current Facility-Administered Medications:     cyanocobalamin injection 1,000 mcg, 1,000 mcg, Intramuscular, Weekly, Diana Priest MD, 1,000 mcg at 10/11/23 1027    Allergies:   Allergies   Allergen Reactions    Chantix [Varenicline] Other (See Comments)     Patient c/o suicidal thoughts    Contrast Dye (Echo Or Unknown Ct/Mr) Hives    Darvon [Propoxyphene] Other (See Comments)     MADE LEFT SIDE \"NUMB\"    Tramadol Hives    Acetaminophen-Codeine Nausea Only     sweat    Hydrocodone-Acetaminophen Itching and Rash    Iodinated Contrast Media Nausea And Vomiting    Morphine And Related Other (See Comments)     Sweat and can't urinate    Tylenol [Acetaminophen] Nausea Only     Tylenol with Codeine #3 TABS. Also apparently reports breakout/rash        IPSS Questionnaire (AUA-7):  Over the past month…    1)  Incomplete Emptying:       How often have you had a sensation of not emptying you had the sensation of not emptying your bladder completely after you finished urinating?  0 - Not at all   2)  Frequency:       How often have you had the urinate again less than two hours after you finished urinating?  1 - Less than 1 time in 5   3)  Intermittency:       How often have you found you " stopped and started again several times when you urinated?   0 - Not at all   4) Urgency:      How often have you found it difficult to postpone urination?  0 - Not at all   5) Weak Stream:      How often have you had a weak urinary stream?  1 - Less than 1 time in 5   6) Straining:       How often have you had to push or strain to begin urination?  0 - Not at all   7) Nocturia:      How many times did you most typically get up to urinate from the time you went to bed at night until the time you got up in the morning?  3 - 3 times   Total Score:  4   The International Prostate Symptom Score (IPSS) is used to screen, diagnose, track symptoms of benign prostatic hyperplasia (BPH).   0-7 (Mild Symptoms) 8-19 (Moderate) 20-35 (Severe)   Quality of Life (QoL):  If you were to spend the rest of your life with your urinary condition just the way it is now, how would you feel about that? 0-Delighted   Urine Leakage (Incontinence) 0-No Leakage     Sexual Health Inventory for Men (SHANA)   Over the past 6 months:     1. How do you rate your confidence that you could get and keep an erection?  1 - Very low   2. When you had erections with sexual  stimulation, how often were your erections hard enough for penetration (entering your partner)?  1 - Almost never or never   3. During sexual intercourse, how often were you able to maintain your erection after you had penetrated (entered) your partner?  1 - Almost never or never   4. During sexual intercourse, how difficult was it to maintain your erection to completion of intercourse?  0 - Did not attempt intercourse   5. When you attempted sexual intercourse, how often was it satisfactory for you?  2 - a few times (much less than half the time)    Total Score: 5   The Sexual Health Inventory for Men further classifies ED severity with the following breakpoints:   1-7 (Severe ED) 8-11 (Moderate ED) 12-16 (Mild to Moderate ED) 17-21 (Mild ED)        Objective     Physical Exam:  "  Vital Signs:   Vitals:    01/29/24 1308   BP: 128/74   Pulse: 69   SpO2: 96%   Weight: 70.8 kg (156 lb)   Height: 177.8 cm (70\")   PainSc: 0-No pain     Body mass index is 22.38 kg/m².     Physical Exam  Vitals and nursing note reviewed.   Constitutional:       Appearance: Normal appearance.   HENT:      Head: Normocephalic and atraumatic.      Mouth/Throat:      Mouth: Mucous membranes are moist.      Pharynx: Oropharynx is clear.   Eyes:      Extraocular Movements: Extraocular movements intact.      Conjunctiva/sclera: Conjunctivae normal.   Cardiovascular:      Rate and Rhythm: Normal rate and regular rhythm.   Pulmonary:      Effort: Pulmonary effort is normal. No respiratory distress.   Abdominal:      Palpations: Abdomen is soft.      Tenderness: There is no abdominal tenderness. There is no right CVA tenderness or left CVA tenderness.   Musculoskeletal:         General: Normal range of motion.      Cervical back: Normal range of motion.   Skin:     General: Skin is warm and dry.      Comments: Laparoscopic incision sites well-healed without erythema.   Neurological:      General: No focal deficit present.      Mental Status: He is alert and oriented to person, place, and time.   Psychiatric:         Mood and Affect: Mood normal.         Behavior: Behavior normal.         Labs:   Brief Urine Lab Results  (Last result in the past 365 days)        Color   Clarity   Blood   Leuk Est   Nitrite   Protein   CREAT   Urine HCG        11/01/23 1037 Yellow   Clear   1+   Negative   Negative   Negative                        Lab Results   Component Value Date    GLUCOSE 120 (H) 12/23/2023    CALCIUM 8.6 12/23/2023     12/23/2023    K 5.2 12/23/2023    CO2 27.0 12/23/2023     12/23/2023    BUN 18 12/23/2023    CREATININE 0.87 12/23/2023    EGFRIFAFRI 102 01/12/2022    EGFRIFNONA 84 01/12/2022    BCR 20.7 12/23/2023    ANIONGAP 8.0 12/23/2023       Lab Results   Component Value Date    WBC 12.77 (H) " 12/23/2023    HGB 13.2 12/23/2023    HCT 39.7 12/23/2023    MCV 90.6 12/23/2023     12/23/2023       Images:   CT Chest Low Dose Cancer Screening WO    Result Date: 1/17/2024  No suspicious pulmonary nodules or masses. Stable 8 mm nodule along the right minor fissure, likely an intrafissural lymph node.  LUNG RADS CATEGORY: 2  RECOMMENDATION:LDCT in 12 months   CTDI: 1.1 mGy DLP: 49.6 mGy.cm     This study was performed with techniques to keep radiation doses as low as reasonably achievable (ALARA). Individualized dose reduction techniques using automated exposure control or adjustment of mA and/or kV according to the patient size were employed.    This report was signed and finalized on 1/17/2024 4:18 PM by Bredna Burris MD.      XR Chest PA & Lateral    Result Date: 12/18/2023  Impression: 1. No acute process. 2. Emphysema. Electronically Signed: Chidi Frye MD  12/18/2023 10:01 AM EST  Workstation ID: [id number]      Measures:   Tobacco:   Michael Torrezyles  reports that he has been smoking cigarettes. He has a 57.00 pack-year smoking history. He has been exposed to tobacco smoke. He has quit using smokeless tobacco.  His smokeless tobacco use included chew and snuff..     Assessment / Plan      Assessment/Plan:   68 y.o. male who presented today for routine postoperative visit with a repeat PSA.  Status post prostatectomy on 12/22/2023.  Grade group 3 disease with left positive margin.  PSA today is 0.031.  We will carefully monitor his PSA going forward.  Dr. Buckner had discussed with him the potential need for salvage XRT pending PSA trend.  He is otherwise doing well postoperatively.  His incisions have healed nicely.  He has returned to normal daily activities.  Stress urinary incontinence/leakage symptoms as expected.  Will have him do pelvic floor physical therapy to help with this.  Follow-up in 3 months with a repeat PSA.  He and his wife know to call the office with any questions or  concerns.  They are happy with this plan.    Diagnoses and all orders for this visit:    1. Prostate cancer (Primary)  -     Ambulatory Referral to Physical Therapy Evaluate and treat, Pelvic Floor  -     PSA Diagnostic; Future         Follow Up:   Return in about 3 months (around 4/29/2024) for f/u with penticuff with PSA.    I spent approximately 20 minutes providing clinical care for this patient; including review of patient's chart and provider documentation, face to face time spent with patient in examination room (obtaining history, performing physical exam, discussing diagnosis and management options), placing orders, and completing patient documentation.     Sheng Schulz PA-C  [facility] [place]

## 2024-02-28 DIAGNOSIS — F17.200 TOBACCO USE DISORDER: ICD-10-CM

## 2024-02-29 RX ORDER — BUPROPION HYDROCHLORIDE 150 MG/1
150 TABLET ORAL DAILY
Qty: 30 TABLET | Refills: 1 | Status: SHIPPED | OUTPATIENT
Start: 2024-02-29

## 2024-03-26 ENCOUNTER — TELEPHONE (OUTPATIENT)
Dept: UROLOGY | Facility: CLINIC | Age: 69
End: 2024-03-26
Payer: MEDICARE

## 2024-03-26 NOTE — TELEPHONE ENCOUNTER
"Provider: DR. SUMMERS    Caller: Anabelle Edward    Relationship to Patient: Emergency Contact     Phone Number: [phone]    Reason for Call: CLINICAL PROBLEM    When was the patient last seen: 01/29/24 (NATALIE)    Is it okay if the provider responds through MyChart: YES    Notes: MRS. EDWARD REPORTS THAT PATIENT HAD HIS PROSTATE REMOVED DECEMBER 2023 AND HAS BEEN HURTING FOR OVER A WEEK NOW.     PATIENT IS SCHEDULED TO SEE DR. SUMMERS ON 04/29/24 WITH A PSA PRIOR, BUT MRS. EDWARD WANTS TO KNOW IF HE SHOULD BE SEEN SOONER. SHE IS CONCERNED THAT \"THE CANCER IN THE PELVIC WALL IS WORSE.\"    SHE ALSO STATES THAT HE IS OUT OF THE PILLS THAT HELPED WITH HIS \"MUSCLE SPASMS.\" HE TOOK THE LAST PILL YESTERDAY AND THEY HELP EASE THE PAIN.     PLEASE CALL PATIENT TO DISCUSS.     ---UNABLE TO WARM TRANSFER   "

## 2024-04-02 ENCOUNTER — LAB (OUTPATIENT)
Dept: LAB | Facility: HOSPITAL | Age: 69
End: 2024-04-02
Payer: MEDICARE

## 2024-04-02 DIAGNOSIS — C61 PROSTATE CANCER: ICD-10-CM

## 2024-04-02 LAB — PSA SERPL-MCNC: <0.014 NG/ML (ref 0–4)

## 2024-04-02 PROCEDURE — 36415 COLL VENOUS BLD VENIPUNCTURE: CPT

## 2024-04-02 PROCEDURE — 84153 ASSAY OF PSA TOTAL: CPT

## 2024-04-04 ENCOUNTER — OFFICE VISIT (OUTPATIENT)
Dept: UROLOGY | Facility: CLINIC | Age: 69
End: 2024-04-04
Payer: MEDICARE

## 2024-04-04 VITALS
DIASTOLIC BLOOD PRESSURE: 50 MMHG | BODY MASS INDEX: 21.95 KG/M2 | OXYGEN SATURATION: 93 % | SYSTOLIC BLOOD PRESSURE: 102 MMHG | HEART RATE: 65 BPM | HEIGHT: 71 IN | WEIGHT: 156.8 LBS

## 2024-04-04 DIAGNOSIS — N50.82 SCROTAL PAIN: ICD-10-CM

## 2024-04-04 DIAGNOSIS — C61 PROSTATE CANCER: Primary | ICD-10-CM

## 2024-04-04 NOTE — PROGRESS NOTES
Follow Up Office Visit      Patient Name: Michael Bridges  : 1955   MRN: [id number]     Chief Complaint:    Chief Complaint   Patient presents with    Prostate Cancer       Referring Provider: No ref. provider found    History of Present Illness: Michael Bridges is a 69 y.o. male who presents today for prostate cancer and repeat PSA.     He is s/p robotic assisted radical prostatectomy with left partial nerve sparing and pelvic lymph node dissection on 2023.    Pathology pT2N0, +margin.    He reports pain in the left groin skin about 2 weeks ago. He reports burning sensation in left hemiscrotum. He denies dysuria or gross hematuria. He is wearing 1 pull up a day for urinary incontinence. This has improved from previously wearing 2 pull ups a day. His pull up is currently dry and was placed this morning, good news.  He reports moderate urinary stream. He was referred to pelvic floor physical therapy at his last office visit but has been unable to go due to transportation issues. He has been working on Kegel exercises at home.     PSA has now reached undetectable levels which is great news.      IPSS 4.       Subjective      Review of System: Review of Systems   Genitourinary: Negative.  Negative for dysuria, hematuria, penile swelling and scrotal swelling.      I have reviewed the ROS documented by my clinical staff, I have updated appropriately and I agree. Vaibhav Buckner MD    I have reviewed and the following portions of the patient's history were updated as appropriate: past family history, past medical history, past social history, past surgical history and problem list.    Medications:     Current Outpatient Medications:     amoxicillin-clavulanate (AUGMENTIN) 875-125 MG per tablet, Take 1 tablet by mouth 2 (Two) Times a Day., Disp: 6 tablet, Rfl: 0    buPROPion XL (WELLBUTRIN XL) 150 MG 24 hr tablet, TAKE 1 TABLET BY MOUTH DAILY. (Patient taking differently: Take 1 tablet by    Panel Management Review    Summary:    Patient is due/failing the following:   FOLLOW UP, LDL and PAP    Action needed:   Patient needs office visit for Depression/Anxiety & PAP  Patient needs fasting lab only appointment    Type of outreach:    Phone, left message for patient to call back.     Questions for provider review:    None                                                                                                                                    Paula Cuellar CMA ([facility])       Chart routed to Care Team .      Patient has the following on her problem list:     Depression / Dysthymia review    Measure:  Needs PHQ-9 score of 4 or less during index window.  Administer PHQ-9 and if score is 5 or more, send encounter to provider for next steps.      PHQ-9 SCORE 2/18/2020 5/20/2020 7/27/2020   PHQ-9 Total Score MyChart 17 (Moderately severe depression) - -   PHQ-9 Total Score 17 17 23       If PHQ-9 recheck is 5 or more, route to provider for next steps.    Patient is due for:  None      Composite cancer screening  Chart review shows that this patient is due/due soon for the following Pap Smear             mouth Daily. Will start when he picks up his patches), Disp: 30 tablet, Rfl: 1    Combivent Respimat  MCG/ACT inhaler, INHALE 1 PUFF BY MOUTH 4 (FOUR) TIMES A DAY. (Patient taking differently: 1 puff 4 (Four) Times a Day.), Disp: 4 g, Rfl: 10    flunisolide (NASALIDE) 25 MCG/ACT (0.025%) solution nasal spray, Inhale 2 sprays Daily., Disp: 1 bottle, Rfl: 5    gabapentin (NEURONTIN) 600 MG tablet, Take 1 tablet by mouth 3 (Three) Times a Day., Disp: 90 tablet, Rfl: 0    methocarbamol (ROBAXIN) 500 MG tablet, Take 1 tablet by mouth 2 (Two) Times a Day., Disp: 30 tablet, Rfl: 0    omeprazole (priLOSEC) 40 MG capsule, TAKE 1 CAPSULE BY MOUTH ONCE DAILY (Patient taking differently: 1 capsule Daily.), Disp: 90 capsule, Rfl: 0    oxybutynin XL (DITROPAN-XL) 10 MG 24 hr tablet, Take 1 tablet by mouth Daily., Disp: 8 tablet, Rfl: 0    oxyCODONE (ROXICODONE) 10 MG tablet, 1 tablet Every 8 (Eight) Hours As Needed for Moderate Pain. TAKES 3 A DAY, Disp: , Rfl:     oxyCODONE (Roxicodone) 5 MG immediate release tablet, Take 2 tablets by mouth Every 8 (Eight) Hours As Needed for Severe Pain., Disp: 15 tablet, Rfl: 0    polyethylene glycol (MIRALAX) 17 g packet, Take 17 g by mouth Daily. (Patient taking differently: Take 17 g by mouth Daily As Needed (CONSTIPATION).), Disp: 30 packet, Rfl: 11    polyethylene glycol (MIRALAX) 17 GM/SCOOP powder, Mix 238g powder with 64 oz of clear liquid. Use as directed., Disp: 238 g, Rfl: 0    sildenafil (REVATIO) 20 MG tablet, Take 5 tablets by mouth Daily. 1 hr prior to sexual activity, Disp: 90 tablet, Rfl: 4    simvastatin (ZOCOR) 40 MG tablet, Take 1 tablet by mouth Every Night., Disp: , Rfl:     vitamin D3 125 MCG (5000 UT) capsule capsule, Take 1 capsule by mouth Daily., Disp: 30 capsule, Rfl: 11    aspirin 81 MG EC tablet, Take 1 tablet by mouth Daily. (Patient not taking: Reported on 4/4/2024), Disp: 90 tablet, Rfl: 11    bisacodyl 5 MG EC tablet, Use as directed. (Patient not  "taking: Reported on 4/4/2024), Disp: 4 tablet, Rfl: 0    enalapril (VASOTEC) 10 MG tablet, Take 1 tablet by mouth 2 (Two) Times a Day., Disp: , Rfl: 5    nicotine (Nicoderm CQ) 21 MG/24HR patch, Place 1 patch on the skin as directed by provider Daily. (Patient not taking: Reported on 4/4/2024), Disp: 28 each, Rfl: 2    phenazopyridine (Pyridium) 200 MG tablet, Take 1 tablet by mouth 3 (Three) Times a Day As Needed for Bladder Spasms. (Patient not taking: Reported on 4/4/2024), Disp: 20 tablet, Rfl: 0    polyethylene glycol (MIRALAX) 17 GM/SCOOP powder, Mix one capful (17 g) in 8 ounces of liquid and drink by mouth Daily. (Patient not taking: Reported on 4/4/2024), Disp: 119 g, Rfl: 1    sulfamethoxazole-trimethoprim (Bactrim DS) 800-160 MG per tablet, Take 1 tablet by mouth 2 (Two) Times a Day. (Patient not taking: Reported on 1/29/2024), Disp: 14 tablet, Rfl: 0    Current Facility-Administered Medications:     cyanocobalamin injection 1,000 mcg, 1,000 mcg, Intramuscular, Weekly, Diana Priest MD, 1,000 mcg at 10/11/23 1027    Allergies:   Allergies   Allergen Reactions    Chantix [Varenicline] Other (See Comments)     Patient c/o suicidal thoughts    Contrast Dye (Echo Or Unknown Ct/Mr) Hives    Darvon [Propoxyphene] Other (See Comments)     MADE LEFT SIDE \"NUMB\"    Tramadol Hives    Acetaminophen-Codeine Nausea Only     sweat    Hydrocodone-Acetaminophen Itching and Rash    Iodinated Contrast Media Nausea And Vomiting    Morphine And Related Other (See Comments)     Sweat and can't urinate    Tylenol [Acetaminophen] Nausea Only     Tylenol with Codeine #3 TABS. Also apparently reports breakout/rash        IPSS Questionnaire (AUA-7):  Over the past month…    1)  Incomplete Emptying:       How often have you had a sensation of not emptying you had the sensation of not emptying your bladder completely after you finished urinating?  1 - Less than 1 time in 5   2)  Frequency:       How often have you had the urinate " "again less than two hours after you finished urinating?  1 - Less than 1 time in 5   3)  Intermittency:       How often have you found you stopped and started again several times when you urinated?   0 - Not at all   4) Urgency:      How often have you found it difficult to postpone urination?  0 - Not at all   5) Weak Stream:      How often have you had a weak urinary stream?  1 - Less than 1 time in 5   6) Straining:       How often have you had to push or strain to begin urination?  0 - Not at all   7) Nocturia:      How many times did you most typically get up to urinate from the time you went to bed at night until the time you got up in the morning?  1 - 1 time   Total Score:  4   The International Prostate Symptom Score (IPSS) is used to screen, diagnose, track symptoms of benign prostatic hyperplasia (BPH).   0-7 (Mild Symptoms) 8-19 (Moderate) 20-35 (Severe)   Quality of Life (QoL):  If you were to spend the rest of your life with your urinary condition just the way it is now, how would you feel about that? 5-Unhappy   Urine Leakage (Incontinence) 0-No Leakage       Objective     Physical Exam:   Vital Signs:   Vitals:    04/04/24 1116   BP: 102/50   Pulse: 65   SpO2: 93%   Weight: 71.1 kg (156 lb 12.8 oz)   Height: 180.3 cm (71\")     Body mass index is 21.87 kg/m².     Physical Exam  Constitutional:       Appearance: Normal appearance.   HENT:      Head: Normocephalic and atraumatic.      Nose: Nose normal.   Eyes:      Extraocular Movements: Extraocular movements intact.      Conjunctiva/sclera: Conjunctivae normal.      Pupils: Pupils are equal, round, and reactive to light.   Abdominal:      Comments: Robotic port site incisions well-approximated with scar, no hernia present.   Genitourinary:     Comments: Uncircumcised phallus, orthotopic medius, bilaterally descended testicles without masses induration or discomfort.  No obvious erythema of the scrotal or groin skin.  Musculoskeletal:         General: " Normal range of motion.      Cervical back: Normal range of motion and neck supple.   Skin:     General: Skin is warm and dry.      Findings: No lesion or rash.   Neurological:      General: No focal deficit present.      Mental Status: He is alert and oriented to person, place, and time. Mental status is at baseline.   Psychiatric:         Mood and Affect: Mood normal.         Behavior: Behavior normal.         Labs:   Brief Urine Lab Results  (Last result in the past 365 days)        Color   Clarity   Blood   Leuk Est   Nitrite   Protein   CREAT   Urine HCG        11/01/23 1037 Yellow   Clear   1+   Negative   Negative   Negative                        Lab Results   Component Value Date    GLUCOSE 120 (H) 12/23/2023    CALCIUM 8.6 12/23/2023     12/23/2023    K 5.2 12/23/2023    CO2 27.0 12/23/2023     12/23/2023    BUN 18 12/23/2023    CREATININE 0.87 12/23/2023    EGFRIFAFRI 102 01/12/2022    EGFRIFNONA 84 01/12/2022    BCR 20.7 12/23/2023    ANIONGAP 8.0 12/23/2023       Lab Results   Component Value Date    WBC 12.77 (H) 12/23/2023    HGB 13.2 12/23/2023    HCT 39.7 12/23/2023    MCV 90.6 12/23/2023     12/23/2023       Images:   CT Chest Low Dose Cancer Screening WO    Result Date: 1/17/2024  No suspicious pulmonary nodules or masses. Stable 8 mm nodule along the right minor fissure, likely an intrafissural lymph node.  LUNG RADS CATEGORY: 2  RECOMMENDATION:LDCT in 12 months   CTDI: 1.1 mGy DLP: 49.6 mGy.cm     This study was performed with techniques to keep radiation doses as low as reasonably achievable (ALARA). Individualized dose reduction techniques using automated exposure control or adjustment of mA and/or kV according to the patient size were employed.    This report was signed and finalized on 1/17/2024 4:18 PM by Brenda Burris MD.      XR Chest PA & Lateral    Result Date: 12/18/2023  Impression: 1. No acute process. 2. Emphysema. Electronically Signed: Chidi Frye MD   12/18/2023 10:01 AM EST  Workstation ID: [id number]      Measures:   Tobacco:   Michael Bridges  reports that he has been smoking cigarettes. He has a 57 pack-year smoking history. He has been exposed to tobacco smoke. He has quit using smokeless tobacco.  His smokeless tobacco use included chew and snuff.         Assessment / Plan      Assessment/Plan:   69 y.o. male who presented today for follow up of prostate cancer s/p robotic assisted radical prostatectomy with left partial nerve sparing and pelvic lymph node dissection on 12/22/2023 with Dr. Buckner. His PSA is undetectable, therefore we are hopeful he will be able to avoid any salvage radiation or hormonal blockers. He will continue working on Kegel exercises at home for urinary incontinence. He will follow up in 3 months with PSA prior.     We discussed mild irritation of skin of left hemiscrotum, discussed using OTC lotion. No evidence of epididymitis or erythema on exam. He is understanding and agreeable.     Diagnoses and all orders for this visit:    1. Prostate cancer (Primary)  -     Cancel: PSA Diagnostic; Future  -     PSA Diagnostic; Future    2. Scrotal pain       Follow Up:   Return in about 3 months (around 7/4/2024) for PSA prior.    I spent approximately 20 minutes providing clinical care for this patient; including review of patient's chart and provider documentation, face to face time spent with patient in examination room (obtaining history, performing physical exam, discussing diagnosis and management options), placing orders, and completing patient documentation.     Vaibhav Buckner MD  [facility] [place], [place] [facility]

## 2024-04-05 RX ORDER — OMEPRAZOLE 40 MG/1
CAPSULE, DELAYED RELEASE ORAL
Qty: 90 CAPSULE | Refills: 0 | Status: SHIPPED | OUTPATIENT
Start: 2024-04-05

## 2024-04-08 ENCOUNTER — OFFICE VISIT (OUTPATIENT)
Dept: FAMILY MEDICINE CLINIC | Facility: CLINIC | Age: 69
End: 2024-04-08
Payer: MEDICARE

## 2024-04-08 VITALS
HEART RATE: 81 BPM | BODY MASS INDEX: 21.84 KG/M2 | WEIGHT: 156 LBS | HEIGHT: 71 IN | DIASTOLIC BLOOD PRESSURE: 68 MMHG | SYSTOLIC BLOOD PRESSURE: 112 MMHG | OXYGEN SATURATION: 96 %

## 2024-04-08 DIAGNOSIS — Z12.11 SCREEN FOR COLON CANCER: ICD-10-CM

## 2024-04-08 DIAGNOSIS — R51.9 CHRONIC DAILY HEADACHE: ICD-10-CM

## 2024-04-08 DIAGNOSIS — K21.9 GASTROESOPHAGEAL REFLUX DISEASE, UNSPECIFIED WHETHER ESOPHAGITIS PRESENT: ICD-10-CM

## 2024-04-08 DIAGNOSIS — Z53.20 COLONOSCOPY REFUSED: ICD-10-CM

## 2024-04-08 DIAGNOSIS — G25.81 RESTLESS LEGS SYNDROME: ICD-10-CM

## 2024-04-08 DIAGNOSIS — E78.2 MIXED HYPERLIPIDEMIA: ICD-10-CM

## 2024-04-08 DIAGNOSIS — J30.1 SEASONAL ALLERGIC RHINITIS DUE TO POLLEN: ICD-10-CM

## 2024-04-08 DIAGNOSIS — C61 PROSTATE CANCER: ICD-10-CM

## 2024-04-08 DIAGNOSIS — E53.8 COBALAMIN DEFICIENCY: ICD-10-CM

## 2024-04-08 DIAGNOSIS — J43.8 OTHER EMPHYSEMA: ICD-10-CM

## 2024-04-08 DIAGNOSIS — Z79.891 CHRONIC PRESCRIPTION OPIATE USE: ICD-10-CM

## 2024-04-08 DIAGNOSIS — M48.062 SPINAL STENOSIS OF LUMBAR REGION WITH NEUROGENIC CLAUDICATION: ICD-10-CM

## 2024-04-08 DIAGNOSIS — R26.9 ABNORMAL GAIT: ICD-10-CM

## 2024-04-08 DIAGNOSIS — G62.9 PERIPHERAL POLYNEUROPATHY: ICD-10-CM

## 2024-04-08 DIAGNOSIS — E53.8 VITAMIN B 12 DEFICIENCY: ICD-10-CM

## 2024-04-08 DIAGNOSIS — Z00.00 MEDICARE ANNUAL WELLNESS VISIT, SUBSEQUENT: Primary | ICD-10-CM

## 2024-04-08 DIAGNOSIS — G47.09 OTHER INSOMNIA: ICD-10-CM

## 2024-04-08 DIAGNOSIS — E55.9 VITAMIN D DEFICIENCY: ICD-10-CM

## 2024-04-08 DIAGNOSIS — D72.829 LEUKOCYTOSIS, UNSPECIFIED TYPE: ICD-10-CM

## 2024-04-08 DIAGNOSIS — G89.4 CHRONIC PAIN SYNDROME: ICD-10-CM

## 2024-04-08 DIAGNOSIS — J01.00 ACUTE NON-RECURRENT MAXILLARY SINUSITIS: ICD-10-CM

## 2024-04-08 DIAGNOSIS — E11.9 WELL CONTROLLED DIABETES MELLITUS: ICD-10-CM

## 2024-04-08 DIAGNOSIS — Q62.5 CONGENITAL DUPLICATION OF RENAL COLLECTING SYSTEM: ICD-10-CM

## 2024-04-08 DIAGNOSIS — F17.200 TOBACCO USE DISORDER: ICD-10-CM

## 2024-04-08 DIAGNOSIS — M48.02 SPINAL STENOSIS OF CERVICAL REGION: ICD-10-CM

## 2024-04-08 DIAGNOSIS — E11.42 DIABETIC POLYNEUROPATHY ASSOCIATED WITH TYPE 2 DIABETES MELLITUS: ICD-10-CM

## 2024-04-08 DIAGNOSIS — Z90.79 S/P PROSTATECTOMY: ICD-10-CM

## 2024-04-08 DIAGNOSIS — N40.1 BENIGN PROSTATIC HYPERPLASIA WITH LOWER URINARY TRACT SYMPTOMS, SYMPTOM DETAILS UNSPECIFIED: ICD-10-CM

## 2024-04-08 DIAGNOSIS — I25.10 CHRONIC CORONARY ARTERY DISEASE: ICD-10-CM

## 2024-04-08 RX ORDER — PREDNISONE 10 MG/1
TABLET ORAL
Qty: 15 TABLET | Refills: 0 | Status: SHIPPED | OUTPATIENT
Start: 2024-04-08

## 2024-04-08 RX ORDER — AZELASTINE 1 MG/ML
2 SPRAY, METERED NASAL 2 TIMES DAILY
Qty: 30 ML | Refills: 5 | Status: SHIPPED | OUTPATIENT
Start: 2024-04-08

## 2024-04-08 RX ORDER — AMOXICILLIN AND CLAVULANATE POTASSIUM 875; 125 MG/1; MG/1
1 TABLET, FILM COATED ORAL EVERY 12 HOURS SCHEDULED
Qty: 20 TABLET | Refills: 0 | Status: SHIPPED | OUTPATIENT
Start: 2024-04-08 | End: 2024-04-18

## 2024-04-08 NOTE — PROGRESS NOTES
The ABCs of the Annual Wellness Visit  Subsequent Medicare Wellness Visit    Subjective    Michael Bridges is a 69 y.o. male who presents for a Subsequent Medicare Wellness Visit.    The following portions of the patient's history were reviewed and   updated as appropriate: allergies, current medications, past family history, past medical history, past social history, past surgical history, and problem list.    Compared to one year ago, the patient feels his physical   health is worse.    Compared to one year ago, the patient feels his mental   health is the same.    Recent Hospitalizations:  He was admitted within the past 365 days at [facility].       Current Medical Providers:  Patient Care Team:  Diana Priest MD as PCP - General (Family Medicine)  Breezy Noel II, MD as Consulting Physician (Pain Medicine)  Cheryl Marin MD as Consulting Physician (Cardiology)  Laura Noguera PA-C as Physician Assistant (Urology)  Ramiro Coe MD as Referring Physician (Urology)  Norm Soliman MD as Consulting Physician (Radiation Oncology)    Outpatient Medications Prior to Visit   Medication Sig Dispense Refill    Combivent Respimat  MCG/ACT inhaler INHALE 1 PUFF BY MOUTH 4 (FOUR) TIMES A DAY. (Patient taking differently: 1 puff 4 (Four) Times a Day.) 4 g 10    enalapril (VASOTEC) 10 MG tablet Take 1 tablet by mouth 2 (Two) Times a Day.  5    flunisolide (NASALIDE) 25 MCG/ACT (0.025%) solution nasal spray Inhale 2 sprays Daily. 1 bottle 5    gabapentin (NEURONTIN) 600 MG tablet Take 1 tablet by mouth 3 (Three) Times a Day. 90 tablet 0    methocarbamol (ROBAXIN) 500 MG tablet Take 1 tablet by mouth 2 (Two) Times a Day. 30 tablet 0    omeprazole (priLOSEC) 40 MG capsule TAKE 1 CAPSULE BY MOUTH ONCE DAILY 90 capsule 0    oxybutynin XL (DITROPAN-XL) 10 MG 24 hr tablet Take 1 tablet by mouth Daily. 8 tablet 0    oxyCODONE (ROXICODONE) 10 MG tablet 1 tablet Every 8 (Eight)  Hours As Needed for Moderate Pain. TAKES 3 A DAY      phenazopyridine (Pyridium) 200 MG tablet Take 1 tablet by mouth 3 (Three) Times a Day As Needed for Bladder Spasms. 20 tablet 0    polyethylene glycol (MIRALAX) 17 g packet Take 17 g by mouth Daily. (Patient taking differently: Take 17 g by mouth Daily As Needed (CONSTIPATION).) 30 packet 11    simvastatin (ZOCOR) 40 MG tablet Take 1 tablet by mouth Every Night.      vitamin D3 125 MCG (5000 UT) capsule capsule Take 1 capsule by mouth Daily. 30 capsule 11    amoxicillin-clavulanate (AUGMENTIN) 875-125 MG per tablet Take 1 tablet by mouth 2 (Two) Times a Day. 6 tablet 0    buPROPion XL (WELLBUTRIN XL) 150 MG 24 hr tablet TAKE 1 TABLET BY MOUTH DAILY. (Patient taking differently: Take 1 tablet by mouth Daily. Will start when he picks up his patches) 30 tablet 1    sildenafil (REVATIO) 20 MG tablet Take 5 tablets by mouth Daily. 1 hr prior to sexual activity 90 tablet 4    aspirin 81 MG EC tablet Take 1 tablet by mouth Daily. 90 tablet 11    bisacodyl 5 MG EC tablet Use as directed. 4 tablet 0    nicotine (Nicoderm CQ) 21 MG/24HR patch Place 1 patch on the skin as directed by provider Daily. 28 each 2    oxyCODONE (Roxicodone) 5 MG immediate release tablet Take 2 tablets by mouth Every 8 (Eight) Hours As Needed for Severe Pain. 15 tablet 0    polyethylene glycol (MIRALAX) 17 GM/SCOOP powder Mix 238g powder with 64 oz of clear liquid. Use as directed. 238 g 0    polyethylene glycol (MIRALAX) 17 GM/SCOOP powder Mix one capful (17 g) in 8 ounces of liquid and drink by mouth Daily. 119 g 1    sulfamethoxazole-trimethoprim (Bactrim DS) 800-160 MG per tablet Take 1 tablet by mouth 2 (Two) Times a Day. 14 tablet 0     Facility-Administered Medications Prior to Visit   Medication Dose Route Frequency Provider Last Rate Last Admin    cyanocobalamin injection 1,000 mcg  1,000 mcg Intramuscular Weekly Diana Priest MD   1,000 mcg at 10/11/23 1027       Opioid medication/s  are on active medication list.  and I have evaluated his active treatment plan and pain score trends (see table).  There were no vitals filed for this visit.  I have reviewed the chart for potential of high risk medication and harmful drug interactions in the elderly.          Aspirin is not on active medication list.  Aspirin use is indicated based on review of current medical condition/s. Pros and cons of this therapy have been discussed with this patient. Benefits of this medication outweigh potential harm.  Patient has been instructed to start taking this medication..    Patient Active Problem List   Diagnosis    Atopic rhinitis    Osteoarthritis of cervical spine    Chronic pain syndrome    Well controlled diabetes mellitus    Chronic coronary artery disease    Degeneration of intervertebral disc of cervical region    Gastroesophageal reflux disease    Abnormal gait    Hyperlipidemia    Insomnia    Degeneration of intervertebral disc of lumbar region    Lumbar radiculopathy    Spinal stenosis of lumbar region    Neuropathic pain syndrome (non-herpetic)    Peripheral neuropathy    Pulmonary emphysema    Restless legs syndrome    Spinal stenosis of cervical region    Tremor    Cobalamin deficiency    Vitamin D deficiency    Constipation    Muscle spasm    Nodule of right lung    Chronic daily headache    BPH (benign prostatic hypertrophy)    Congenital duplication of renal collecting system    Chronic back pain    Colonoscopy refused    Chronic prescription opiate use    Diabetic polyneuropathy associated with type 2 diabetes mellitus    Prostate cancer    Tobacco use disorder    S/P prostatectomy     Advance Care Planning   Advance Care Planning     Advance Directive is not on file.  ACP discussion was held with the patient during this visit. Patient does not have an advance directive, information provided.     Objective    Vitals:    04/08/24 1006   BP: 112/68   Pulse: 81   SpO2: 96%   Weight: 70.8 kg (156 lb)  "  Height: 180.3 cm (71\")     Estimated body mass index is 21.76 kg/m² as calculated from the following:    Height as of this encounter: 180.3 cm (71\").    Weight as of this encounter: 70.8 kg (156 lb).    BMI is within normal parameters. No other follow-up for BMI required.      Does the patient have evidence of cognitive impairment? No    Lab Results   Component Value Date    CHLPL 168 2024    TRIG 145 2024    HDL 55 2024    LDL 88 2024    VLDL 25 2024    HGBA1C 5.70 (H) 2024        HEALTH RISK ASSESSMENT    Smoking Status:  Social History     Tobacco Use   Smoking Status Every Day    Current packs/day: 1.00    Average packs/day: 1 pack/day for 57.0 years (57.0 ttl pk-yrs)    Types: Cigarettes    Passive exposure: Current   Smokeless Tobacco Former    Types: Chew, Snuff   Tobacco Comments    has smoked up to 2-3 ppd intermittently     Alcohol Consumption:  Social History     Substance and Sexual Activity   Alcohol Use No     Fall Risk Screen:    STEADI Fall Risk Assessment was completed, and patient is at MODERATE risk for falls. Assessment completed on:2024    Depression Screenin/8/2024    10:05 AM   PHQ-2/PHQ-9 Depression Screening   Little Interest or Pleasure in Doing Things 0-->not at all   Feeling Down, Depressed or Hopeless 0-->not at all   PHQ-9: Brief Depression Severity Measure Score 0       Health Habits and Functional and Cognitive Screenin/8/2024    10:03 AM   Functional & Cognitive Status   Do you have difficulty preparing food and eating? No   Do you have difficulty bathing yourself, getting dressed or grooming yourself? No   Do you have difficulty using the toilet? No   Do you have difficulty moving around from place to place? No   Do you have trouble with steps or getting out of a bed or a chair? Yes   Current Diet Well Balanced Diet   Dental Exam Not up to date   Eye Exam Up to date   Exercise (times per week) 7 times per week   Current " Exercises Include Walking   Do you need help using the phone?  Yes   Are you deaf or do you have serious difficulty hearing?  No   Do you need help to go to places out of walking distance? Yes   Do you need help shopping? No   Do you need help preparing meals?  No   Do you need help with housework?  No   Do you need help with laundry? No   Do you need help taking your medications? No   Do you need help managing money? No   Do you ever drive or ride in a car without wearing a seat belt? No   Have you felt unusual stress, anger or loneliness in the last month? No   Who do you live with? Spouse   If you need help, do you have trouble finding someone available to you? No   Have you been bothered in the last four weeks by sexual problems? No   Do you have difficulty concentrating, remembering or making decisions? No       Age-appropriate Screening Schedule:  Refer to the list below for future screening recommendations based on patient's age, sex and/or medical conditions. Orders for these recommended tests are listed in the plan section. The patient has been provided with a written plan.    Health Maintenance   Topic Date Due    DXA SCAN  Never done    ZOSTER VACCINE (1 of 2) Never done    COLORECTAL CANCER SCREENING  01/01/2010    RSV Vaccine - Adults (1 - 1-dose 60+ series) Never done    DIABETIC FOOT EXAM  03/12/2020    COVID-19 Vaccine (3 - 2023-24 season) 09/01/2023    ANNUAL WELLNESS VISIT  02/01/2024    DIABETIC EYE EXAM  07/01/2024    INFLUENZA VACCINE  08/01/2024    HEMOGLOBIN A1C  10/08/2024    LUNG CANCER SCREENING  01/17/2025    LIPID PANEL  04/08/2025    URINE MICROALBUMIN  04/08/2025    TDAP/TD VACCINES (3 - Td or Tdap) 01/17/2028    HEPATITIS C SCREENING  Completed    Pneumococcal Vaccine 65+  Completed    AAA SCREEN (ONE-TIME)  Completed                  CMS Preventative Services Quick Reference  Risk Factors Identified During Encounter  Chronic Pain: Natural history and expected course discussed.  Questions answered.  Fall Risk-High or Moderate: Discussed Fall Prevention in the home and Information on Fall Prevention Shared in After Visit Summary  Immunizations Discussed/Encouraged: Tdap, Influenza, Pneumococcal 23, Prevnar 20 (Pneumococcal 20-valent conjugate), Shingrix, COVID19, and RSV (Respiratory Syncytial Virus)  Inactivity/Sedentary: Patient was advised to exercise at least 150 minutes a week per CDC recommendations.  Tobacco Use/Dependance Risk (use dotphrase .tobaccocessation for documentation)  The above risks/problems have been discussed with the patient.  Pertinent information has been shared with the patient in the After Visit Summary.  An After Visit Summary and PPPS were made available to the patient.    Follow Up:   Next Medicare Wellness visit to be scheduled in 1 year.       Additional E&M Note during same encounter follows:  Patient has multiple medical problems which are significant and separately identifiable that require additional work above and beyond the Medicare Wellness Visit.      Chief Complaint  Medicare Wellness-subsequent    Subjective        HPI  Michael Bridges is also being seen today for routine f/u on chronic med problems.    Has struggled with allergy symptoms for few weeks. Over past 2 weeks has had increased nasal congestion, facial pain/pressure, purulent nasal drainage, thick postnasal drainage.    Has well controlled diabetes mellitus. Complains of decreased sensation in both feet, denies open wounds or ulcers. Blood sugar rarely over 200.     He has emphysema/COPD and is a chronic smoker.  Smoking approximate 1/2 pack/day or more. Is considering us of buproprion and patches.     Chronic conditions include CAD, HLP, B12 def, vit D def, COPD/emphysema, PAD. On ASA, statin, enlapril. Denies new symptoms.     Followed by urology for prostate CA. Recent good report.    Pt's previous HPI reviewed and updated as indicated.        Review of Systems   Constitutional:   "Positive for fatigue. Negative for fever.   HENT:  Positive for congestion and postnasal drip. Negative for mouth sores, nosebleeds and trouble swallowing.    Eyes:  Positive for visual disturbance (chronic stable).   Respiratory:  Positive for cough. Negative for shortness of breath and wheezing.    Cardiovascular:  Positive for leg swelling. Negative for chest pain and palpitations.   Gastrointestinal:  Negative for abdominal pain, blood in stool, diarrhea, nausea and vomiting.   Endocrine: Positive for cold intolerance.   Genitourinary:  Negative for difficulty urinating, dysuria, hematuria, scrotal swelling and testicular pain.   Musculoskeletal:  Positive for arthralgias, back pain, gait problem, joint swelling, myalgias, neck pain and neck stiffness.   Skin:  Negative for rash and wound.   Neurological:  Positive for dizziness, tremors, weakness, numbness and confusion (occ mild). Negative for syncope.   Hematological:  Negative for adenopathy. Does not bruise/bleed easily.   Psychiatric/Behavioral:  Positive for sleep disturbance. Negative for dysphoric mood. The patient is nervous/anxious.    Pt's previous ROS reviewed and updated as indicated.       Objective   Vital Signs:  /68   Pulse 81   Ht 180.3 cm (71\")   Wt 70.8 kg (156 lb)   SpO2 96%   BMI 21.76 kg/m²     Physical Exam  Vitals and nursing note reviewed.   Constitutional:       General: He is not in acute distress.     Appearance: He is well-groomed and normal weight. He is not ill-appearing.      Comments: Appears older than stated age.   HENT:      Head: Atraumatic.      Mouth/Throat:      Mouth: Mucous membranes are moist.   Eyes:      General: No scleral icterus.     Conjunctiva/sclera: Conjunctivae normal.   Neck:      Thyroid: No thyroid mass.   Cardiovascular:      Rate and Rhythm: Normal rate and regular rhythm.      Pulses:           Dorsalis pedis pulses are 1+ on the right side and 1+ on the left side.        Posterior tibial " pulses are 1+ on the right side and 1+ on the left side.      Heart sounds: Heart sounds are distant.   Pulmonary:      Effort: Pulmonary effort is normal.      Breath sounds: Decreased breath sounds present. No wheezing, rhonchi or rales.   Abdominal:      General: Abdomen is scaphoid.   Musculoskeletal:      Right lower leg: No edema.      Left lower leg: No edema.   Lymphadenopathy:      Cervical: No cervical adenopathy.   Skin:     General: Skin is warm and dry.      Coloration: Skin is not cyanotic, jaundiced or pale.      Findings: No bruising or rash.   Neurological:      Mental Status: He is alert and oriented to person, place, and time. Mental status is at baseline.      Motor: Weakness (lower extremities, appears at baseline) present.      Gait: Gait abnormal (antalgic, requires assistance, using single prong cane).   Psychiatric:         Mood and Affect: Mood and affect normal.         Behavior: Behavior normal. Behavior is cooperative.         Cognition and Memory: Cognition normal.     Pt's previous physical exam reviewed and updated as indicated.                Lab Results   Component Value Date    PSA <0.014 04/02/2024    PSA 0.031 01/25/2024    PSA 15.400 (H) 11/02/2023          Assessment and Plan   Diagnoses and all orders for this visit:    1. Medicare annual wellness visit, subsequent (Primary)    2. Acute non-recurrent maxillary sinusitis    3. Seasonal allergic rhinitis due to pollen    4. Vitamin B 12 deficiency  -     CBC & Differential  -     Vitamin B12    5. Leukocytosis, unspecified type  -     CBC & Differential    6. Well controlled diabetes mellitus  -     Hemoglobin A1c  -     Microalbumin / Creatinine Urine Ratio - Urine, Clean Catch  -     Comprehensive Metabolic Panel    7. Mixed hyperlipidemia  -     Lipid Panel  -     Comprehensive Metabolic Panel    8. Tobacco use disorder    9. Other emphysema    10. Chronic daily headache    11. Chronic pain syndrome    12. Chronic coronary  artery disease    13. Vitamin D deficiency    14. Cobalamin deficiency    15. Gastroesophageal reflux disease, unspecified whether esophagitis present    16. Colonoscopy refused    17. S/P prostatectomy    18. Congenital duplication of renal collecting system    19. Benign prostatic hyperplasia with lower urinary tract symptoms, symptom details unspecified    20. Prostate cancer    21. Chronic prescription opiate use    22. Diabetic polyneuropathy associated with type 2 diabetes mellitus    23. Abnormal gait    24. Other insomnia    25. Peripheral polyneuropathy    26. Restless legs syndrome    27. Spinal stenosis of cervical region    28. Spinal stenosis of lumbar region with neurogenic claudication    29. Screen for colon cancer  -     Cologuard - Stool, Per Rectum; Future    Other orders  -     amoxicillin-clavulanate (AUGMENTIN) 875-125 MG per tablet; Take 1 tablet by mouth Every 12 (Twelve) Hours for 10 days.  Dispense: 20 tablet; Refill: 0  -     predniSONE (DELTASONE) 10 MG tablet; 5 po day 1, then decrease by 1 tablet each day until gone (5,4,3,2,1)  Dispense: 15 tablet; Refill: 0  -     azelastine (ASTELIN) 0.1 % nasal spray; 2 sprays into the nostril(s) as directed by provider 2 (Two) Times a Day. Use in each nostril as directed  Dispense: 30 mL; Refill: 5      Abx, oral CS, astelin for allergic rhinitis with subsequent bacterial sinusitis.    Continue current mgnt plan for chronic conditions.    Risks/Benefits of colon cancer screening options reviewed. Pt voiced understanding and wishes to proceed with Cologuard.         Follow Up   Return in about 6 months (around 10/8/2024).  F/u sooner as needed/instructed.  I will contact patient regarding test results and provide instructions regarding any necessary changes in plan of care.  Patient was encouraged to keep me informed of any acute changes, lack of improvement, or any new concerning symptoms.  Pt is aware of reasons to seek emergent care.  Patient  voiced understanding of all instructions and denied further questions.    Tobacco cessation advised.  Pt voiced understanding of health risks of cont'd tobacco use.    Patient was given instructions and counseling regarding his condition or for health maintenance advice. Please see specific information pulled into the AVS if appropriate.     Please note that portions of this note may have been completed with a voice recognition program.

## 2024-04-08 NOTE — PATIENT INSTRUCTIONS
Medicare Wellness  Personal Prevention Plan of Service     Date of Office Visit:    Encounter Provider:  Diana Priest MD  Place of Service:  [facility]  Patient Name: Michael Bridges  :  1955    As part of the Medicare Wellness portion of your visit today, we are providing you with this personalized preventive plan of services (PPPS). This plan is based upon recommendations of the [place] Preventive Services Task Force (USPSTF) and the Advisory Committee on Immunization Practices (ACIP).    This lists the preventive care services that should be considered, and provides dates of when you are due. Items listed as completed are up-to-date and do not require any further intervention.    Health Maintenance   Topic Date Due    DXA SCAN  Never done    ZOSTER VACCINE (1 of 2) Never done    COLORECTAL CANCER SCREENING  2010    RSV Vaccine - Adults (1 - 1-dose 60+ series) Never done    DIABETIC FOOT EXAM  2020    DIABETIC EYE EXAM  2023    COVID-19 Vaccine (3 - -24 season) 2023    ANNUAL WELLNESS VISIT  2024    LIPID PANEL  2024    URINE MICROALBUMIN  2024    HEMOGLOBIN A1C  06/15/2024    INFLUENZA VACCINE  2024    LUNG CANCER SCREENING  2025    TDAP/TD VACCINES (3 - Td or Tdap) 2028    HEPATITIS C SCREENING  Completed    Pneumococcal Vaccine 65+  Completed    AAA SCREEN (ONE-TIME)  Completed       Orders Placed This Encounter   Procedures    Hemoglobin A1c     Order Specific Question:   Release to patient     Answer:   Routine Release [[id number]]    Microalbumin / Creatinine Urine Ratio - Urine, Clean Catch     Order Specific Question:   Release to patient     Answer:   Routine Release [[id number]]    Lipid Panel     Order Specific Question:   Release to patient     Answer:   Routine Release [[id number]]    Comprehensive Metabolic Panel     Order Specific Question:   Release to patient     Answer:    Routine Release [[id number]]    Vitamin B12     Order Specific Question:   Release to patient     Answer:   Routine Release [[id number]]    CBC & Differential     Order Specific Question:   Manual Differential     Answer:   No     Order Specific Question:   Release to patient     Answer:   Routine Release [[id number]]       Return in about 6 months (around 10/8/2024).

## 2024-04-09 LAB
ALBUMIN SERPL-MCNC: 4.1 G/DL (ref 3.5–5.2)
ALBUMIN/CREAT UR: 10 MG/G CREAT (ref 0–29)
ALBUMIN/GLOB SERPL: 2.1 G/DL
ALP SERPL-CCNC: 65 U/L (ref 39–117)
ALT SERPL-CCNC: 14 U/L (ref 1–41)
AST SERPL-CCNC: 18 U/L (ref 1–40)
BASOPHILS # BLD AUTO: 0.06 10*3/MM3 (ref 0–0.2)
BASOPHILS NFR BLD AUTO: 0.9 % (ref 0–1.5)
BILIRUB SERPL-MCNC: 0.3 MG/DL (ref 0–1.2)
BUN SERPL-MCNC: 13 MG/DL (ref 8–23)
BUN/CREAT SERPL: 12.5 (ref 7–25)
CALCIUM SERPL-MCNC: 9.4 MG/DL (ref 8.6–10.5)
CHLORIDE SERPL-SCNC: 103 MMOL/L (ref 98–107)
CHOLEST SERPL-MCNC: 168 MG/DL (ref 0–200)
CO2 SERPL-SCNC: 28.4 MMOL/L (ref 22–29)
CREAT SERPL-MCNC: 1.04 MG/DL (ref 0.76–1.27)
CREAT UR-MCNC: 161.6 MG/DL
EGFRCR SERPLBLD CKD-EPI 2021: 77.7 ML/MIN/1.73
EOSINOPHIL # BLD AUTO: 0.08 10*3/MM3 (ref 0–0.4)
EOSINOPHIL NFR BLD AUTO: 1.2 % (ref 0.3–6.2)
ERYTHROCYTE [DISTWIDTH] IN BLOOD BY AUTOMATED COUNT: 13.5 % (ref 12.3–15.4)
GLOBULIN SER CALC-MCNC: 2 GM/DL
GLUCOSE SERPL-MCNC: 86 MG/DL (ref 65–99)
HBA1C MFR BLD: 5.7 % (ref 4.8–5.6)
HCT VFR BLD AUTO: 48.2 % (ref 37.5–51)
HDLC SERPL-MCNC: 55 MG/DL (ref 40–60)
HGB BLD-MCNC: 16.3 G/DL (ref 13–17.7)
IMM GRANULOCYTES # BLD AUTO: 0.02 10*3/MM3 (ref 0–0.05)
IMM GRANULOCYTES NFR BLD AUTO: 0.3 % (ref 0–0.5)
LDLC SERPL CALC-MCNC: 88 MG/DL (ref 0–100)
LYMPHOCYTES # BLD AUTO: 2 10*3/MM3 (ref 0.7–3.1)
LYMPHOCYTES NFR BLD AUTO: 29.1 % (ref 19.6–45.3)
MCH RBC QN AUTO: 30.2 PG (ref 26.6–33)
MCHC RBC AUTO-ENTMCNC: 33.8 G/DL (ref 31.5–35.7)
MCV RBC AUTO: 89.4 FL (ref 79–97)
MICROALBUMIN UR-MCNC: 15.6 UG/ML
MONOCYTES # BLD AUTO: 0.68 10*3/MM3 (ref 0.1–0.9)
MONOCYTES NFR BLD AUTO: 9.9 % (ref 5–12)
NEUTROPHILS # BLD AUTO: 4.03 10*3/MM3 (ref 1.7–7)
NEUTROPHILS NFR BLD AUTO: 58.6 % (ref 42.7–76)
NRBC BLD AUTO-RTO: 0 /100 WBC (ref 0–0.2)
PLATELET # BLD AUTO: 275 10*3/MM3 (ref 140–450)
POTASSIUM SERPL-SCNC: 4.9 MMOL/L (ref 3.5–5.2)
PROT SERPL-MCNC: 6.1 G/DL (ref 6–8.5)
RBC # BLD AUTO: 5.39 10*6/MM3 (ref 4.14–5.8)
SODIUM SERPL-SCNC: 140 MMOL/L (ref 136–145)
TRIGL SERPL-MCNC: 145 MG/DL (ref 0–150)
VIT B12 SERPL-MCNC: 410 PG/ML (ref 211–946)
VLDLC SERPL CALC-MCNC: 25 MG/DL (ref 5–40)
WBC # BLD AUTO: 6.87 10*3/MM3 (ref 3.4–10.8)

## 2024-04-10 RX ORDER — ASPIRIN 81 MG/1
81 TABLET ORAL DAILY
Qty: 30 TABLET | Refills: 11 | Status: SHIPPED | OUTPATIENT
Start: 2024-04-10

## 2024-04-29 DIAGNOSIS — F17.200 TOBACCO USE DISORDER: ICD-10-CM

## 2024-04-30 RX ORDER — OMEPRAZOLE 40 MG/1
CAPSULE, DELAYED RELEASE ORAL
Qty: 90 CAPSULE | Refills: 0 | Status: SHIPPED | OUTPATIENT
Start: 2024-04-30

## 2024-04-30 RX ORDER — BUPROPION HYDROCHLORIDE 150 MG/1
150 TABLET ORAL DAILY
Qty: 30 TABLET | Refills: 0 | OUTPATIENT
Start: 2024-04-30

## 2024-05-09 ENCOUNTER — TELEPHONE (OUTPATIENT)
Dept: UROLOGY | Facility: CLINIC | Age: 69
End: 2024-05-09

## 2024-05-09 NOTE — TELEPHONE ENCOUNTER
Caller: Sofy Kelly    Relationship to patient: Emergency Contact    Best call back number: [phone]    Chief complaint: PT NEEDS TO RESCHEDULE APPT WITH ESAU GARCIA DUE TO CONFLICT    Type of visit: FOLLOW UP    Requested date: 5/14/24 OR EARLIER IN DAY FOR 5/15     If rescheduling, when is the original appointment: 5/15/24 2P     Additional notes:OK TO LEAVE A VM

## 2024-05-14 ENCOUNTER — TELEPHONE (OUTPATIENT)
Dept: UROLOGY | Facility: CLINIC | Age: 69
End: 2024-05-14

## 2024-05-14 NOTE — TELEPHONE ENCOUNTER
"I called and left  for patient to confirm his appt today. Patient does not need this appt due to recent visit with Dr. Buckner and recent PSA results.  If patient calls back Okay to relay HUB \"reschedule appt for July with labs prior with Grecia\"   "

## 2024-05-20 ENCOUNTER — TELEPHONE (OUTPATIENT)
Dept: UROLOGY | Facility: CLINIC | Age: 69
End: 2024-05-20

## 2024-05-20 NOTE — TELEPHONE ENCOUNTER
Caller: Anabelle Bridges     Relationship: WIFE    Best call back number: [phone] (home)     What is the best time to reach you: ANY    Who are you requesting to speak with (clinical staff, provider,  specific staff member): DR OLMOS    Do you know the name of the person who called: PT WIFE CALLED OFFICE.    What was the call regarding: PT WIFE CALLING REGARDING PT S[employer]ENIFIL. WONDERING IF HE SHOULD STILL TAKE IT AFTER HIS SURGERY. PLEASE GIVE PT A CALL TO DISCUSS. THANK YOU.

## 2024-07-02 ENCOUNTER — LAB (OUTPATIENT)
Dept: LAB | Facility: HOSPITAL | Age: 69
End: 2024-07-02
Payer: MEDICARE

## 2024-07-02 DIAGNOSIS — C61 PROSTATE CANCER: ICD-10-CM

## 2024-07-02 PROCEDURE — 84153 ASSAY OF PSA TOTAL: CPT | Performed by: UROLOGY

## 2024-07-03 ENCOUNTER — OFFICE VISIT (OUTPATIENT)
Dept: UROLOGY | Facility: CLINIC | Age: 69
End: 2024-07-03
Payer: MEDICARE

## 2024-07-03 VITALS — SYSTOLIC BLOOD PRESSURE: 113 MMHG | OXYGEN SATURATION: 96 % | DIASTOLIC BLOOD PRESSURE: 69 MMHG | HEART RATE: 75 BPM

## 2024-07-03 DIAGNOSIS — C61 PROSTATE CANCER: Primary | ICD-10-CM

## 2024-07-03 NOTE — PROGRESS NOTES
Follow Up Office Visit      Patient Name: Michael Bridges  : 1955   MRN: [id number]     Chief Complaint:    Chief Complaint   Patient presents with    Prostate Cancer       Referring Provider: No ref. provider found    History of Present Illness: Michael Bridges is a 69 y.o. male who presents today for follow up of prostate cancer. He is s/p robotic assisted radical prostatectomy with left partial nerve sparing and pelvic lymph node dissection on 2023 for prostate cancer with Dr. Buckner.     He denies any further urinary incontinence. He is no longer wearing briefs. He reports strong urinary stream. He denies any pain. He is feeling well today.     24;PSA undetectable. <0.014.   Subjective      Review of System: Review of Systems   Genitourinary: Negative.  Negative for dysuria, frequency, hematuria and urgency.   All other systems reviewed and are negative.     I have reviewed the ROS documented by my clinical staff, I have updated appropriately and I agree. BEATRICE Mcgovern    I have reviewed and the following portions of the patient's history were updated as appropriate: past family history, past medical history, past social history, past surgical history and problem list.    Medications:     Current Outpatient Medications:     aspirin 81 MG EC tablet, Take 1 tablet by mouth Daily., Disp: 30 tablet, Rfl: 11    azelastine (ASTELIN) 0.1 % nasal spray, 2 sprays into the nostril(s) as directed by provider 2 (Two) Times a Day. Use in each nostril as directed, Disp: 30 mL, Rfl: 5    Combivent Respimat  MCG/ACT inhaler, INHALE 1 PUFF BY MOUTH 4 (FOUR) TIMES A DAY. (Patient taking differently: 1 puff 4 (Four) Times a Day.), Disp: 4 g, Rfl: 10    enalapril (VASOTEC) 10 MG tablet, Take 1 tablet by mouth 2 (Two) Times a Day., Disp: , Rfl: 5    flunisolide (NASALIDE) 25 MCG/ACT (0.025%) solution nasal spray, Inhale 2 sprays Daily., Disp: 1 bottle, Rfl: 5    gabapentin (NEURONTIN)  "600 MG tablet, Take 1 tablet by mouth 3 (Three) Times a Day., Disp: 90 tablet, Rfl: 0    methocarbamol (ROBAXIN) 500 MG tablet, Take 1 tablet by mouth 2 (Two) Times a Day., Disp: 30 tablet, Rfl: 0    omeprazole (priLOSEC) 40 MG capsule, TAKE 1 CAPSULE BY MOUTH ONCE DAILY, Disp: 90 capsule, Rfl: 0    oxybutynin XL (DITROPAN-XL) 10 MG 24 hr tablet, Take 1 tablet by mouth Daily., Disp: 8 tablet, Rfl: 0    oxyCODONE (ROXICODONE) 10 MG tablet, 1 tablet Every 8 (Eight) Hours As Needed for Moderate Pain. TAKES 3 A DAY, Disp: , Rfl:     phenazopyridine (Pyridium) 200 MG tablet, Take 1 tablet by mouth 3 (Three) Times a Day As Needed for Bladder Spasms., Disp: 20 tablet, Rfl: 0    polyethylene glycol (MIRALAX) 17 g packet, Take 17 g by mouth Daily. (Patient taking differently: Take 17 g by mouth Daily As Needed (CONSTIPATION).), Disp: 30 packet, Rfl: 11    predniSONE (DELTASONE) 10 MG tablet, 5 po day 1, then decrease by 1 tablet each day until gone (5,4,3,2,1), Disp: 15 tablet, Rfl: 0    simvastatin (ZOCOR) 40 MG tablet, Take 1 tablet by mouth Every Night., Disp: , Rfl:     vitamin D3 125 MCG (5000 UT) capsule capsule, Take 1 capsule by mouth Daily., Disp: 30 capsule, Rfl: 11    Current Facility-Administered Medications:     cyanocobalamin injection 1,000 mcg, 1,000 mcg, Intramuscular, Weekly, Diana Priest MD, 1,000 mcg at 10/11/23 1027    Allergies:   Allergies   Allergen Reactions    Chantix [Varenicline] Other (See Comments)     Patient c/o suicidal thoughts    Contrast Dye (Echo Or Unknown Ct/Mr) Hives    Darvon [Propoxyphene] Other (See Comments)     MADE LEFT SIDE \"NUMB\"    Tramadol Hives    Acetaminophen-Codeine Nausea Only     sweat    Hydrocodone-Acetaminophen Itching and Rash    Iodinated Contrast Media Nausea And Vomiting    Morphine And Codeine Other (See Comments)     Sweat and can't urinate    Tylenol [Acetaminophen] Nausea Only     Tylenol with Codeine #3 TABS. Also apparently reports breakout/rash  "       IPSS Questionnaire (AUA-7):  Over the past month…    1)  Incomplete Emptying:       How often have you had a sensation of not emptying you had the sensation of not emptying your bladder completely after you finished urinating?  0 - Not at all   2)  Frequency:       How often have you had the urinate again less than two hours after you finished urinating?  1 - Less than 1 time in 5   3)  Intermittency:       How often have you found you stopped and started again several times when you urinated?   0 - Not at all   4) Urgency:      How often have you found it difficult to postpone urination?  0 - Not at all   5) Weak Stream:      How often have you had a weak urinary stream?  0 - Not at all   6) Straining:       How often have you had to push or strain to begin urination?  0 - Not at all   7) Nocturia:      How many times did you most typically get up to urinate from the time you went to bed at night until the time you got up in the morning?  0 - None   Total Score:  1   The International Prostate Symptom Score (IPSS) is used to screen, diagnose, track symptoms of benign prostatic hyperplasia (BPH).   0-7 (Mild Symptoms) 8-19 (Moderate) 20-35 (Severe)   Quality of Life (QoL):  If you were to spend the rest of your life with your urinary condition just the way it is now, how would you feel about that? 1-Pleased   Urine Leakage (Incontinence) 0-No Leakage          Objective     Physical Exam:   Vital Signs:   Vitals:    07/03/24 0933   BP: 113/69   Pulse: 75   SpO2: 96%     There is no height or weight on file to calculate BMI.     Physical Exam  Vitals and nursing note reviewed.   Constitutional:       Appearance: Normal appearance.   HENT:      Head: Normocephalic and atraumatic.      Nose: Nose normal.      Mouth/Throat:      Mouth: Mucous membranes are moist.   Eyes:      Pupils: Pupils are equal, round, and reactive to light.   Pulmonary:      Effort: Pulmonary effort is normal.   Abdominal:      General:  Abdomen is flat.      Palpations: Abdomen is soft.   Musculoskeletal:         General: Normal range of motion.      Cervical back: Normal range of motion.   Skin:     General: Skin is warm and dry.      Capillary Refill: Capillary refill takes less than 2 seconds.   Neurological:      General: No focal deficit present.      Mental Status: He is alert.   Psychiatric:         Mood and Affect: Mood normal.       Labs:   Brief Urine Lab Results  (Last result in the past 365 days)        Color   Clarity   Blood   Leuk Est   Nitrite   Protein   CREAT   Urine HCG        04/08/24 1048             161.6                      Lab Results   Component Value Date    GLUCOSE 86 04/08/2024    CALCIUM 9.4 04/08/2024     04/08/2024    K 4.9 04/08/2024    CO2 28.4 04/08/2024     04/08/2024    BUN 13 04/08/2024    CREATININE 1.04 04/08/2024    EGFRIFAFRI 102 01/12/2022    EGFRIFNONA 84 01/12/2022    BCR 12.5 04/08/2024    ANIONGAP 8.0 12/23/2023       Lab Results   Component Value Date    WBC 6.87 04/08/2024    HGB 16.3 04/08/2024    HCT 48.2 04/08/2024    MCV 89.4 04/08/2024     04/08/2024       Images:   No Images in the past 120 days found..    Measures:   Tobacco:   Michael Bridges  reports that he has been smoking cigarettes. He has a 57 pack-year smoking history. He has been exposed to tobacco smoke. He has quit using smokeless tobacco.  His smokeless tobacco use included chew and snuff..     I advised him to quit.      Urine Incontinence: Patient reports that he is not currently experiencing any symptoms of urinary incontinence.      Assessment / Plan      Assessment/Plan:   69 y.o. male who presented today for follow up of prostate cancer s/p radical prostatectomy with left partial nerve sparing and pelvic lymph node dissection on 12/22/2023 for prostate cancer. PSA undetectable. His urinary incontinence has resolved. He is feeling well. He will follow up in 6 months with PSA prior. He is understanding  and agreeable with plan of care.     Diagnoses and all orders for this visit:    1. Prostate cancer (Primary)  -     PSA Diagnostic; Future         Follow Up:   Return in about 6 months (around 1/3/2025) for PSA prior .    I spent approximately 20 minutes providing clinical care for this patient; including review of patient's chart and provider documentation, face to face time spent with patient in examination room (obtaining history, performing physical exam, discussing diagnosis and management options), placing orders, and completing patient documentation.     BEATRICE Mcgovern  [facility] [place]

## 2024-07-29 ENCOUNTER — TELEPHONE (OUTPATIENT)
Dept: UROLOGY | Facility: CLINIC | Age: 69
End: 2024-07-29

## 2024-10-08 ENCOUNTER — OFFICE VISIT (OUTPATIENT)
Dept: FAMILY MEDICINE CLINIC | Facility: CLINIC | Age: 69
End: 2024-10-08
Payer: MEDICARE

## 2024-10-08 VITALS
BODY MASS INDEX: 22.12 KG/M2 | OXYGEN SATURATION: 97 % | HEART RATE: 59 BPM | WEIGHT: 158 LBS | SYSTOLIC BLOOD PRESSURE: 124 MMHG | DIASTOLIC BLOOD PRESSURE: 76 MMHG | HEIGHT: 71 IN

## 2024-10-08 DIAGNOSIS — E78.2 MIXED HYPERLIPIDEMIA: ICD-10-CM

## 2024-10-08 DIAGNOSIS — Z23 NEED FOR INFLUENZA VACCINATION: ICD-10-CM

## 2024-10-08 DIAGNOSIS — E11.9 WELL CONTROLLED DIABETES MELLITUS: Primary | ICD-10-CM

## 2024-10-08 DIAGNOSIS — E53.8 COBALAMIN DEFICIENCY: ICD-10-CM

## 2024-10-08 DIAGNOSIS — Z51.81 MEDICATION MONITORING ENCOUNTER: ICD-10-CM

## 2024-10-08 DIAGNOSIS — Z12.11 SCREEN FOR COLON CANCER: ICD-10-CM

## 2024-10-08 DIAGNOSIS — I25.10 CHRONIC CORONARY ARTERY DISEASE: ICD-10-CM

## 2024-10-08 DIAGNOSIS — E55.9 VITAMIN D DEFICIENCY: ICD-10-CM

## 2024-10-08 RX ADMIN — CYANOCOBALAMIN 1000 MCG: 1000 INJECTION, SOLUTION INTRAMUSCULAR; SUBCUTANEOUS at 11:37

## 2024-10-08 NOTE — PROGRESS NOTES
Subjective   Michael Bridges is a 69 y.o. male.     History of Present Illness  Here for routine f/u on chronic med problems    Has well controlled diabetes mellitus. Complains of decreased sensation in both feet, denies open wounds or ulcers. Blood sugar rarely over 200.     He has emphysema/COPD and is a chronic smoker.  Smoking approximate 1 pack/day. Declines smoking cessation counseling.      Chronic conditions include CAD, HLP, B12 def, vit D def, PAD. On ASA, statin, enalapril. Denies new symptoms.     Followed by urology for prostate CA. Recent good report.     Eye check overdue    On vitamin D and B12 replacement.    Has cologuard at home but has not completed and has been over 6 months.     The following portions of the patient's history were reviewed and updated as appropriate: allergies, current medications, past family history, past medical history, past social history, past surgical history, and problem list.    Review of Systems   Constitutional:  Positive for fatigue. Negative for appetite change, fever and unexpected weight change.   HENT:  Positive for congestion and postnasal drip. Negative for mouth sores, nosebleeds, rhinorrhea, sore throat and trouble swallowing.    Eyes:  Negative for visual disturbance.   Respiratory:  Positive for cough (dry, intermittent) and shortness of breath (mild with exertion). Negative for wheezing.    Cardiovascular:  Positive for leg swelling (mild, stable). Negative for chest pain and palpitations.   Gastrointestinal:  Negative for abdominal pain, constipation, nausea and vomiting.   Endocrine: Positive for cold intolerance. Negative for polydipsia and polyuria.   Genitourinary:  Negative for decreased urine volume, dysuria and hematuria.   Musculoskeletal:  Positive for arthralgias, back pain, gait problem, myalgias, neck pain and neck stiffness.   Skin:  Negative for rash and wound.   Neurological:  Positive for dizziness, tremors, weakness, numbness and  headaches. Negative for syncope.   Hematological:  Negative for adenopathy. Does not bruise/bleed easily.   Psychiatric/Behavioral:  Positive for confusion (mild, intermittent) and sleep disturbance. Negative for dysphoric mood. The patient is nervous/anxious.    Pt's previous ROS reviewed and updated as indicated.       Objective   Vitals:    10/08/24 0856   BP: 124/76   Pulse: 59   SpO2: 97%     Body mass index is 22.04 kg/m².      10/08/24  0856   Weight: 71.7 kg (158 lb)       Physical Exam  Vitals and nursing note reviewed.   Constitutional:       General: He is not in acute distress.     Appearance: He is well-groomed and normal weight. He is not ill-appearing.      Comments: Appears older than stated age.   HENT:      Head: Atraumatic.      Mouth/Throat:      Mouth: Mucous membranes are moist.   Eyes:      General: No scleral icterus.     Conjunctiva/sclera: Conjunctivae normal.   Neck:      Thyroid: No thyroid mass.   Cardiovascular:      Rate and Rhythm: Normal rate and regular rhythm.      Pulses:           Dorsalis pedis pulses are 1+ on the right side and 1+ on the left side.        Posterior tibial pulses are 1+ on the right side and 1+ on the left side.      Heart sounds: Heart sounds are distant.   Pulmonary:      Effort: Pulmonary effort is normal.      Breath sounds: Decreased breath sounds present. No wheezing, rhonchi or rales.   Abdominal:      General: Abdomen is scaphoid.   Musculoskeletal:      Right lower leg: No edema.      Left lower leg: No edema.   Lymphadenopathy:      Cervical: No cervical adenopathy.   Skin:     General: Skin is warm and dry.      Coloration: Skin is not cyanotic, jaundiced or pale.      Findings: No bruising or rash.   Neurological:      Mental Status: He is alert and oriented to person, place, and time. Mental status is at baseline.      Motor: Weakness (lower extremities, appears at baseline) present.      Gait: Gait abnormal (antalgic, requires assistance, using  single chiquitaallyson cane).   Psychiatric:         Mood and Affect: Mood and affect normal.         Behavior: Behavior normal. Behavior is cooperative.         Cognition and Memory: Cognition normal.     Pt's previous physical exam reviewed and updated as indicated.      Assessment & Plan   Diagnoses and all orders for this visit:    1. Well controlled diabetes mellitus (Primary)  -     Comprehensive Metabolic Panel  -     Hemoglobin A1c    2. Cobalamin deficiency  -     CBC & Differential  -     Vitamin B12    3. Chronic coronary artery disease  -     CBC & Differential  -     Comprehensive Metabolic Panel  -     TSH Rfx On Abnormal To Free T4    4. Mixed hyperlipidemia  -     Comprehensive Metabolic Panel  -     Lipid Panel  -     TSH Rfx On Abnormal To Free T4    5. Medication monitoring encounter  -     CBC & Differential  -     Comprehensive Metabolic Panel    6. Vitamin D deficiency  -     Vitamin D,25-Hydroxy    7. Screen for colon cancer  -     Cologuard - Stool, Per Rectum; Future    8. Need for influenza vaccination  -     Fluzone High-Dose 65+yrs       Assess status of vit/min deficiencies and replace as indicated.    NIDDM controlled.    HTN - controlled. Continue enalapril.    HLP, CAD- continue zocor, ASA. Encouraged heart healthy eating. Appears stable.    Routine f/u in 6 months for AMW, f/u sooner as needed.  I will contact patient regarding test results and provide instructions regarding any necessary changes in plan of care.  Patient was encouraged to keep me informed of any acute changes, lack of improvement, or any new concerning symptoms.  Pt is aware of reasons to seek emergent care.  Patient voiced understanding of all instructions and denied further questions.    Please note that portions of this note may have been completed with a voice recognition program.

## 2024-10-09 LAB
25(OH)D3+25(OH)D2 SERPL-MCNC: 63.6 NG/ML (ref 30–100)
ALBUMIN SERPL-MCNC: 4.2 G/DL (ref 3.5–5.2)
ALBUMIN/GLOB SERPL: 2 G/DL
ALP SERPL-CCNC: 66 U/L (ref 39–117)
ALT SERPL-CCNC: 14 U/L (ref 1–41)
AST SERPL-CCNC: 22 U/L (ref 1–40)
BASOPHILS # BLD AUTO: 0.05 10*3/MM3 (ref 0–0.2)
BASOPHILS NFR BLD AUTO: 0.8 % (ref 0–1.5)
BILIRUB SERPL-MCNC: 0.5 MG/DL (ref 0–1.2)
BUN SERPL-MCNC: 13 MG/DL (ref 8–23)
BUN/CREAT SERPL: 13.8 (ref 7–25)
CALCIUM SERPL-MCNC: 9 MG/DL (ref 8.6–10.5)
CHLORIDE SERPL-SCNC: 103 MMOL/L (ref 98–107)
CHOLEST SERPL-MCNC: 175 MG/DL (ref 0–200)
CO2 SERPL-SCNC: 27.3 MMOL/L (ref 22–29)
CREAT SERPL-MCNC: 0.94 MG/DL (ref 0.76–1.27)
EGFRCR SERPLBLD CKD-EPI 2021: 87.8 ML/MIN/1.73
EOSINOPHIL # BLD AUTO: 0.13 10*3/MM3 (ref 0–0.4)
EOSINOPHIL NFR BLD AUTO: 2 % (ref 0.3–6.2)
ERYTHROCYTE [DISTWIDTH] IN BLOOD BY AUTOMATED COUNT: 14 % (ref 12.3–15.4)
GLOBULIN SER CALC-MCNC: 2.1 GM/DL
GLUCOSE SERPL-MCNC: 90 MG/DL (ref 65–99)
HBA1C MFR BLD: 5.9 % (ref 4.8–5.6)
HCT VFR BLD AUTO: 47.1 % (ref 37.5–51)
HDLC SERPL-MCNC: 58 MG/DL (ref 40–60)
HGB BLD-MCNC: 15.5 G/DL (ref 13–17.7)
IMM GRANULOCYTES # BLD AUTO: 0.01 10*3/MM3 (ref 0–0.05)
IMM GRANULOCYTES NFR BLD AUTO: 0.2 % (ref 0–0.5)
LDLC SERPL CALC-MCNC: 84 MG/DL (ref 0–100)
LYMPHOCYTES # BLD AUTO: 2.2 10*3/MM3 (ref 0.7–3.1)
LYMPHOCYTES NFR BLD AUTO: 33.5 % (ref 19.6–45.3)
MCH RBC QN AUTO: 29.6 PG (ref 26.6–33)
MCHC RBC AUTO-ENTMCNC: 32.9 G/DL (ref 31.5–35.7)
MCV RBC AUTO: 90.1 FL (ref 79–97)
MONOCYTES # BLD AUTO: 0.77 10*3/MM3 (ref 0.1–0.9)
MONOCYTES NFR BLD AUTO: 11.7 % (ref 5–12)
NEUTROPHILS # BLD AUTO: 3.41 10*3/MM3 (ref 1.7–7)
NEUTROPHILS NFR BLD AUTO: 51.8 % (ref 42.7–76)
NRBC BLD AUTO-RTO: 0 /100 WBC (ref 0–0.2)
PLATELET # BLD AUTO: 255 10*3/MM3 (ref 140–450)
POTASSIUM SERPL-SCNC: 4.5 MMOL/L (ref 3.5–5.2)
PROT SERPL-MCNC: 6.3 G/DL (ref 6–8.5)
RBC # BLD AUTO: 5.23 10*6/MM3 (ref 4.14–5.8)
SODIUM SERPL-SCNC: 139 MMOL/L (ref 136–145)
TRIGL SERPL-MCNC: 198 MG/DL (ref 0–150)
TSH SERPL DL<=0.005 MIU/L-ACNC: 0.88 UIU/ML (ref 0.27–4.2)
VIT B12 SERPL-MCNC: >2000 PG/ML (ref 211–946)
VLDLC SERPL CALC-MCNC: 33 MG/DL (ref 5–40)
WBC # BLD AUTO: 6.57 10*3/MM3 (ref 3.4–10.8)

## 2024-10-17 RX ORDER — OMEPRAZOLE 40 MG/1
CAPSULE, DELAYED RELEASE ORAL
Qty: 90 CAPSULE | Refills: 0 | Status: SHIPPED | OUTPATIENT
Start: 2024-10-17

## 2024-11-22 ENCOUNTER — TELEPHONE (OUTPATIENT)
Dept: FAMILY MEDICINE CLINIC | Facility: CLINIC | Age: 69
End: 2024-11-22
Payer: MEDICARE

## 2024-11-22 NOTE — TELEPHONE ENCOUNTER
CALLED PATIENT AND ADVISED TO GO TO  [facility] SINCE THERE WAS NO AVAILABILITY TODAY. HE WILL SEE HOW HE FEELS LATER AND CALL BACK IF NEEDED.

## 2024-12-02 NOTE — TELEPHONE ENCOUNTER
CC:  Jazzy Paredes is here today for   Chief Complaint   Patient presents with    Consultation     Denies Latex allergy or sensitivity  Patient would like communication of their results via:    Cell Phone:   Telephone Information:   Mobile [phone]     Okay to leave a message containing results? Yes            Pill count performed per BERTA Vogt, reviewed.  Percocet count appropriate as patient due for refill.  Gabapentin count inappropriate.  Has 29 left and should have approximately 48.    Urine drug screen obtained and pending.    Patient will be provided with half of his monthly prescription while awaiting urine drug screen results.  Further recommendations pending review.

## 2025-01-07 ENCOUNTER — LAB (OUTPATIENT)
Dept: LAB | Facility: HOSPITAL | Age: 70
End: 2025-01-07
Payer: MEDICARE

## 2025-01-07 DIAGNOSIS — C61 PROSTATE CANCER: ICD-10-CM

## 2025-01-07 LAB — PSA SERPL-MCNC: <0.014 NG/ML (ref 0–4)

## 2025-01-07 PROCEDURE — 84153 ASSAY OF PSA TOTAL: CPT

## 2025-01-07 PROCEDURE — 36415 COLL VENOUS BLD VENIPUNCTURE: CPT

## 2025-01-08 ENCOUNTER — TELEPHONE (OUTPATIENT)
Dept: UROLOGY | Facility: CLINIC | Age: 70
End: 2025-01-08
Payer: MEDICARE

## 2025-01-08 NOTE — TELEPHONE ENCOUNTER
Caller: CLARENCE    Relationship: SPOUSE    Best call back number: [phone]    Caller requesting test results: CLARENCE    What test was performed: PSA    When was the test performed: 1/7/25    Where was the test performed: [facility]    Additional notes: PT SPOUSE CALLING REQUESTING PSA RESULTS

## 2025-01-08 NOTE — TELEPHONE ENCOUNTER
"Called and spoke with Aneta, relayed Roxanne's [employer] message to patient, \"Your PSA is undetectable. This is good news. We will see you tomorrow for appointment.  \"   Patients wife asked if tomorrows appointment is needed since she got the results,and due to the weather. Informed her I would send a message to the provider and give her a call back.   "

## 2025-01-08 NOTE — TELEPHONE ENCOUNTER
Per Roxanne, appointment still needed. Called and spoke with Aneta. Appt has been moved to next week 1/16 due to weather.

## 2025-01-24 RX ORDER — OMEPRAZOLE 40 MG/1
CAPSULE, DELAYED RELEASE ORAL
Qty: 90 CAPSULE | Refills: 0 | Status: SHIPPED | OUTPATIENT
Start: 2025-01-24

## 2025-01-30 ENCOUNTER — TELEPHONE (OUTPATIENT)
Dept: UROLOGY | Facility: CLINIC | Age: 70
End: 2025-01-30

## 2025-01-30 NOTE — TELEPHONE ENCOUNTER
Caller: Michael Bridges    Relationship: Self    Best call back number: [phone] (home)      What is the best time to reach you: ANYTIME, OK TO LVM      What was the call regarding: PATIENT'S WIFE, CLARENCE, CALLED TO CANCEL SAME DAY APPT PRIOR TO APPT TIME DUE TO TRANSPORTATION PROBLEMS.  PLEASE CALL PT TO R/S.    Is it okay if the provider responds through MyChart: NO

## 2025-01-30 NOTE — TELEPHONE ENCOUNTER
Called and was unable to leave VM, pt needs to rescheduled canceled appt with david Leon ok to reschedule

## 2025-01-31 DIAGNOSIS — E55.9 VITAMIN D DEFICIENCY: ICD-10-CM

## 2025-02-07 ENCOUNTER — TELEPHONE (OUTPATIENT)
Dept: FAMILY MEDICINE CLINIC | Facility: CLINIC | Age: 70
End: 2025-02-07
Payer: MEDICARE

## 2025-02-07 DIAGNOSIS — Z87.891 PERSONAL HISTORY OF SMOKING: Primary | ICD-10-CM

## 2025-02-07 DIAGNOSIS — F17.200 ENCOUNTER FOR SCREENING FOR MALIGNANT NEOPLASM OF LUNG IN CURRENT SMOKER WITH 30 PACK YEAR HISTORY OR GREATER: ICD-10-CM

## 2025-02-07 DIAGNOSIS — Z12.2 ENCOUNTER FOR SCREENING FOR MALIGNANT NEOPLASM OF LUNG IN CURRENT SMOKER WITH 30 PACK YEAR HISTORY OR GREATER: ICD-10-CM

## 2025-02-07 NOTE — TELEPHONE ENCOUNTER
Caller: Sofy Kelly    Relationship: Emergency Contact    Best call back number: [phone]    What orders are you requesting (i.e. lab or imaging): CT SCNA LOW DOSE FOR CANCER SCREENING    In what timeframe would the patient need to come in: ANYTIME    Where will you receive your lab/imaging services: [facility]    Additional notes: PLEASE SCHEDULE AND LET PATIENT AND WIFE KNOW WHEN

## 2025-02-17 ENCOUNTER — TELEPHONE (OUTPATIENT)
Dept: FAMILY MEDICINE CLINIC | Facility: CLINIC | Age: 70
End: 2025-02-17

## 2025-02-17 NOTE — TELEPHONE ENCOUNTER
Caller: Anabelle Bridges    Relationship: SPOUSE    Best call back number: [phone]     What medication are you requesting: PATIENT IS REQUESTING MEDICATION FOR RUNNY NOSE, COUGHING, AND SNEEZING    What are your current symptoms: RUNNY NOSE, COUGHING, SNEEZING    How long have you been experiencing symptoms: 3 DAYS  If a prescription is needed, what is your preferred pharmacy and phone number: [facility] - [place], [address] - [phone]  - [phone] FX

## 2025-02-18 ENCOUNTER — OFFICE VISIT (OUTPATIENT)
Dept: FAMILY MEDICINE CLINIC | Facility: CLINIC | Age: 70
End: 2025-02-18
Payer: MEDICARE

## 2025-02-18 VITALS
TEMPERATURE: 100 F | HEIGHT: 71 IN | WEIGHT: 160 LBS | BODY MASS INDEX: 22.4 KG/M2 | HEART RATE: 86 BPM | SYSTOLIC BLOOD PRESSURE: 136 MMHG | DIASTOLIC BLOOD PRESSURE: 80 MMHG | OXYGEN SATURATION: 92 %

## 2025-02-18 DIAGNOSIS — R05.1 ACUTE COUGH: Primary | ICD-10-CM

## 2025-02-18 DIAGNOSIS — J10.1 INFLUENZA A: ICD-10-CM

## 2025-02-18 DIAGNOSIS — J44.1 COPD WITH EXACERBATION: ICD-10-CM

## 2025-02-18 LAB
EXPIRATION DATE: ABNORMAL
FLUAV AG UPPER RESP QL IA.RAPID: DETECTED
FLUBV AG UPPER RESP QL IA.RAPID: NOT DETECTED
INTERNAL CONTROL: ABNORMAL
Lab: ABNORMAL
SARS-COV-2 AG UPPER RESP QL IA.RAPID: NOT DETECTED

## 2025-02-18 PROCEDURE — 1159F MED LIST DOCD IN RCRD: CPT | Performed by: FAMILY MEDICINE

## 2025-02-18 PROCEDURE — 99214 OFFICE O/P EST MOD 30 MIN: CPT | Performed by: FAMILY MEDICINE

## 2025-02-18 PROCEDURE — 87428 SARSCOV & INF VIR A&B AG IA: CPT | Performed by: FAMILY MEDICINE

## 2025-02-18 PROCEDURE — 1126F AMNT PAIN NOTED NONE PRSNT: CPT | Performed by: FAMILY MEDICINE

## 2025-02-18 PROCEDURE — 1160F RVW MEDS BY RX/DR IN RCRD: CPT | Performed by: FAMILY MEDICINE

## 2025-02-18 RX ORDER — OSELTAMIVIR PHOSPHATE 75 MG/1
75 CAPSULE ORAL 2 TIMES DAILY
Qty: 10 CAPSULE | Refills: 0 | Status: SHIPPED | OUTPATIENT
Start: 2025-02-18 | End: 2025-02-23

## 2025-02-18 RX ORDER — PREDNISONE 10 MG/1
TABLET ORAL
Qty: 15 TABLET | Refills: 0 | Status: SHIPPED | OUTPATIENT
Start: 2025-02-18

## 2025-02-18 RX ORDER — DEXTROMETHORPHAN HYDROBROMIDE AND PROMETHAZINE HYDROCHLORIDE 15; 6.25 MG/5ML; MG/5ML
5 SYRUP ORAL 4 TIMES DAILY PRN
Qty: 180 ML | Refills: 0 | Status: SHIPPED | OUTPATIENT
Start: 2025-02-18

## 2025-02-18 RX ORDER — GABAPENTIN 600 MG/1
TABLET ORAL
COMMUNITY
Start: 2025-02-12

## 2025-02-18 NOTE — PROGRESS NOTES
Subjective   Michael Bridges is a 70 y.o. male.     History of Present Illness  cough  Cough  This is a new problem. The current episode started in the past 7 days (3 days ago). The problem has been worse. The problem occurs every few minutes. The cough is Dry. Associated symptoms include chest pain (with cough), chills, a fever (subjective), headaches, myalgias, nasal congestion, postnasal drip, a sore throat (mild) and wheezing. Pertinent negatives include no ear pain, hemoptysis, rash, rhinorrhea or shortness of breath. The symptoms are aggravated by lying down. Risk factors for lung disease include smoking/tobacco exposure. He has tried a beta-agonist inhaler for the symptoms. The treatment provided mild relief. His past medical history is significant for COPD and emphysema.         The following portions of the patient's history were reviewed and updated as appropriate: allergies, current medications, past family history, past medical history, past social history, past surgical history, and problem list.    Review of Systems   Constitutional:  Positive for chills, fatigue and fever (subjective). Negative for unexpected weight change.   HENT:  Positive for congestion, postnasal drip and sore throat (mild). Negative for ear pain, mouth sores, nosebleeds, rhinorrhea and trouble swallowing.    Eyes:  Negative for visual disturbance.   Respiratory:  Positive for cough and wheezing. Negative for hemoptysis and shortness of breath.    Cardiovascular:  Positive for chest pain (with cough) and leg swelling (mild, stable). Negative for palpitations.   Gastrointestinal:  Negative for abdominal pain, constipation, nausea and vomiting.   Endocrine: Positive for cold intolerance. Negative for polydipsia and polyuria.   Genitourinary:  Negative for decreased urine volume, dysuria and hematuria.   Musculoskeletal:  Positive for arthralgias, back pain, gait problem, myalgias, neck pain and neck stiffness.   Skin:   Negative for rash and wound.   Neurological:  Positive for dizziness, tremors, weakness, numbness and headaches. Negative for syncope.   Hematological:  Negative for adenopathy. Does not bruise/bleed easily.   Psychiatric/Behavioral:  Positive for confusion (mild, intermittent) and sleep disturbance. Negative for dysphoric mood. The patient is nervous/anxious.    Pt's previous ROS reviewed and updated as indicated.       Objective   Vitals:    02/18/25 1000   BP: 136/80   Pulse: 86   Temp: 100 °F (37.8 °C)   SpO2: 92%     Body mass index is 22.32 kg/m².      02/18/25  1000   Weight: 72.6 kg (160 lb)       Physical Exam  Vitals and nursing note reviewed.   Constitutional:       General: He is not in acute distress.     Appearance: He is ill-appearing.      Comments: Appears older than stated age.   HENT:      Head: Atraumatic.      Mouth/Throat:      Mouth: Mucous membranes are moist.   Eyes:      General: No scleral icterus.     Conjunctiva/sclera: Conjunctivae normal.   Neck:      Thyroid: No thyroid mass.   Cardiovascular:      Rate and Rhythm: Normal rate and regular rhythm.      Heart sounds: Heart sounds are distant.   Pulmonary:      Effort: Pulmonary effort is normal.      Breath sounds: Decreased breath sounds present. No wheezing, rhonchi or rales.   Musculoskeletal:      Right lower leg: No edema.      Left lower leg: No edema.   Lymphadenopathy:      Cervical: No cervical adenopathy.   Skin:     General: Skin is warm and dry.      Coloration: Skin is not cyanotic.      Findings: No rash.   Neurological:      Mental Status: He is alert and oriented to person, place, and time. Mental status is at baseline.      Motor: Weakness (lower extremities, appears at baseline) present.      Gait: Gait abnormal (antalgic, requires assistance, using single prong cane).   Psychiatric:         Behavior: Behavior normal. Behavior is cooperative.         Cognition and Memory: Cognition normal.     Pt's previous physical  exam reviewed and updated as indicated.    Recent Results (from the past 24 hours)   POCT SARS-CoV-2 + Flu Antigen DIONNE    Collection Time: 02/18/25 10:35 AM    Specimen: Swab   Result Value Ref Range    SARS Antigen Not Detected Not Detected, Presumptive Negative    Influenza A Antigen DIONNE Detected (A) Not Detected    Influenza B Antigen DIONNE Not Detected Not Detected    Internal Control Passed Passed    Lot Number [id number]     Expiration Date 11,142,025          Assessment & Plan   Diagnoses and all orders for this visit:    1. Acute cough (Primary)  -     POCT SARS-CoV-2 + Flu Antigen DIONNE  -     promethazine-dextromethorphan (PROMETHAZINE-DM) 6.25-15 MG/5ML syrup; Take 5 mL by mouth 4 (Four) Times a Day As Needed for Cough.  Dispense: 180 mL; Refill: 0    2. Influenza A  -     oseltamivir (Tamiflu) 75 MG capsule; Take 1 capsule by mouth 2 (Two) Times a Day for 5 days.  Dispense: 10 capsule; Refill: 0  -     promethazine-dextromethorphan (PROMETHAZINE-DM) 6.25-15 MG/5ML syrup; Take 5 mL by mouth 4 (Four) Times a Day As Needed for Cough.  Dispense: 180 mL; Refill: 0    3. COPD with exacerbation  -     predniSONE (DELTASONE) 10 MG tablet; 5 po day 1, then decrease by 1 tablet each day until gone (5,4,3,2,1)  Dispense: 15 tablet; Refill: 0       Symptom mgnt reviewed.  I have reviewed risks/benefits and potential side effects of various treatment options. Pt voices understanding and wishes to proceed with Tamiflu as above.  F/u as scheduled, f/u sooner as needed.  Patient was encouraged to keep me informed of any acute changes, lack of improvement, or any new concerning symptoms.  Pt is aware of reasons to seek emergent care.  Patient voiced understanding of all instructions and denied further questions.    Please note that portions of this note may have been completed with a voice recognition program.

## 2025-03-06 ENCOUNTER — OFFICE VISIT (OUTPATIENT)
Dept: UROLOGY | Facility: CLINIC | Age: 70
End: 2025-03-06
Payer: MEDICARE

## 2025-03-06 VITALS — DIASTOLIC BLOOD PRESSURE: 73 MMHG | OXYGEN SATURATION: 95 % | HEART RATE: 79 BPM | SYSTOLIC BLOOD PRESSURE: 131 MMHG

## 2025-03-06 DIAGNOSIS — Z90.79 S/P PROSTATECTOMY: Primary | ICD-10-CM

## 2025-03-06 DIAGNOSIS — Z85.46 HISTORY OF PROSTATE CANCER: ICD-10-CM

## 2025-03-06 RX ORDER — MONTELUKAST SODIUM 10 MG/1
10 TABLET ORAL NIGHTLY
COMMUNITY
Start: 2025-02-28

## 2025-03-06 NOTE — PROGRESS NOTES
Follow Up Office Visit      Patient Name: Michael Bridges  : 1955   MRN: [id number]     Chief Complaint:    Chief Complaint   Patient presents with    Prostate Cancer       Referring Provider: No ref. provider found    History of Present Illness: Michael Bridges is a 70 y.o. male who presents today for follow up of prostate cancer status post prostatectomy.  I last saw him in 2024.  He underwent a radical prostatectomy 2023 for prostate cancer, pathology demonstrated pT2 N0 grade group 3 disease with a small positive margin.  His PSA has nadired to undetectable and has remained undetectable since this time.  His YANNICK has completely resolved.  He is totally dry.  He has no concerns today.  Not interested in treating erectile dysfunction.    Lab Results   Component Value Date    PSA <0.014 2025    PSA <0.014 2024    PSA <0.014 2024    PSA 0.031 2024    PSA 15.400 (H) 2023    PSA 10.300 (H) 2022         Subjective      Review of System: Review of Systems   Genitourinary: Negative.  Negative for decreased urine volume, difficulty urinating, dysuria, enuresis, flank pain, frequency, hematuria and urgency.      I have reviewed the ROS documented by my clinical staff, I have updated appropriately and I agree. Vaibhav Buckner MD    I have reviewed and the following portions of the patient's history were updated as appropriate: past family history, past medical history, past social history, past surgical history and problem list.    Medications:     Current Outpatient Medications:     aspirin 81 MG EC tablet, Take 1 tablet by mouth Daily., Disp: 30 tablet, Rfl: 11    azelastine (ASTELIN) 0.1 % nasal spray, 2 sprays into the nostril(s) as directed by provider 2 (Two) Times a Day. Use in each nostril as directed, Disp: 30 mL, Rfl: 5    Combivent Respimat  MCG/ACT inhaler, INHALE 1 PUFF BY MOUTH 4 (FOUR) TIMES A DAY. (Patient taking differently:  "1 puff 4 (Four) Times a Day.), Disp: 4 g, Rfl: 10    enalapril (VASOTEC) 10 MG tablet, Take 1 tablet by mouth 2 (Two) Times a Day., Disp: , Rfl: 5    flunisolide (NASALIDE) 25 MCG/ACT (0.025%) solution nasal spray, Inhale 2 sprays Daily., Disp: 1 bottle, Rfl: 5    gabapentin (NEURONTIN) 600 MG tablet, , Disp: , Rfl:     methocarbamol (ROBAXIN) 500 MG tablet, Take 1 tablet by mouth 2 (Two) Times a Day., Disp: 30 tablet, Rfl: 0    montelukast (SINGULAIR) 10 MG tablet, Take 1 tablet by mouth Every Night., Disp: , Rfl:     omeprazole (priLOSEC) 40 MG capsule, TAKE 1 CAPSULE BY MOUTH ONCE DAILY, Disp: 90 capsule, Rfl: 0    oxyCODONE (ROXICODONE) 10 MG tablet, 1 tablet Every 8 (Eight) Hours As Needed for Moderate Pain. TAKES 3 A DAY, Disp: , Rfl:     polyethylene glycol (MIRALAX) 17 g packet, Take 17 g by mouth Daily. (Patient taking differently: Take 17 g by mouth Daily As Needed (CONSTIPATION).), Disp: 30 packet, Rfl: 11    predniSONE (DELTASONE) 10 MG tablet, 5 po day 1, then decrease by 1 tablet each day until gone (5,4,3,2,1), Disp: 15 tablet, Rfl: 0    promethazine-dextromethorphan (PROMETHAZINE-DM) 6.25-15 MG/5ML syrup, Take 5 mL by mouth 4 (Four) Times a Day As Needed for Cough., Disp: 180 mL, Rfl: 0    simvastatin (ZOCOR) 40 MG tablet, Take 1 tablet by mouth Every Night., Disp: , Rfl:     vitamin D3 125 MCG (5000 UT) capsule capsule, TAKE 1 CAPSULE BY MOUTH ONCE DAILY, Disp: 30 capsule, Rfl: 10    Current Facility-Administered Medications:     cyanocobalamin injection 1,000 mcg, 1,000 mcg, Intramuscular, Weekly, Diana Priest MD, 1,000 mcg at 10/08/24 4873    Allergies:   Allergies   Allergen Reactions    Chantix [Varenicline] Other (See Comments)     Patient c/o suicidal thoughts    Contrast Dye (Echo Or Unknown Ct/Mr) Hives    Darvon [Propoxyphene] Other (See Comments)     MADE LEFT SIDE \"NUMB\"    Tramadol Hives    Acetaminophen-Codeine Nausea Only     sweat    Hydrocodone-Acetaminophen Itching and Rash    " Iodinated Contrast Media Nausea And Vomiting    Morphine And Codeine Other (See Comments)     Sweat and can't urinate    Tylenol [Acetaminophen] Nausea Only     Tylenol with Codeine #3 TABS. Also apparently reports breakout/rash        IPSS Questionnaire (AUA-7):  Over the past month…    1)  Incomplete Emptying:       How often have you had a sensation of not emptying you had the sensation of not emptying your bladder completely after you finished urinating?  0 - Not at all   2)  Frequency:       How often have you had the urinate again less than two hours after you finished urinating?  1 - Less than 1 time in 5   3)  Intermittency:       How often have you found you stopped and started again several times when you urinated?   0 - Not at all   4) Urgency:      How often have you found it difficult to postpone urination?  0 - Not at all   5) Weak Stream:      How often have you had a weak urinary stream?  0 - Not at all   6) Straining:       How often have you had to push or strain to begin urination?  0 - Not at all   7) Nocturia:      How many times did you most typically get up to urinate from the time you went to bed at night until the time you got up in the morning?  0 - None   Total Score:  1   The International Prostate Symptom Score (IPSS) is used to screen, diagnose, track symptoms of benign prostatic hyperplasia (BPH).   0-7 (Mild Symptoms) 8-19 (Moderate) 20-35 (Severe)   Quality of Life (QoL):  If you were to spend the rest of your life with your urinary condition just the way it is now, how would you feel about that? 0-Delighted   Urine Leakage (Incontinence) 0-No Leakage         Objective     Physical Exam:   Vital Signs:   Vitals:    03/06/25 0930   BP: 131/73   Pulse: 79   SpO2: 95%     There is no height or weight on file to calculate BMI.     Physical Exam  Constitutional:       Appearance: Normal appearance.   HENT:      Head: Normocephalic and atraumatic.      Nose: Nose normal.   Eyes:       Extraocular Movements: Extraocular movements intact.      Conjunctiva/sclera: Conjunctivae normal.      Pupils: Pupils are equal, round, and reactive to light.   Musculoskeletal:         General: Normal range of motion.      Cervical back: Normal range of motion and neck supple.   Skin:     General: Skin is warm and dry.      Findings: No lesion or rash.   Neurological:      General: No focal deficit present.      Mental Status: He is alert and oriented to person, place, and time. Mental status is at baseline.   Psychiatric:         Mood and Affect: Mood normal.         Behavior: Behavior normal.         Labs:   Brief Urine Lab Results  (Last result in the past 365 days)        Color   Clarity   Blood   Leuk Est   Nitrite   Protein   CREAT   Urine HCG        04/08/24 1048             161.6                      Lab Results   Component Value Date    GLUCOSE 90 10/08/2024    CALCIUM 9.0 10/08/2024     10/08/2024    K 4.5 10/08/2024    CO2 27.3 10/08/2024     10/08/2024    BUN 13 10/08/2024    CREATININE 0.94 10/08/2024    EGFRIFAFRI 102 01/12/2022    EGFRIFNONA 84 01/12/2022    BCR 13.8 10/08/2024    ANIONGAP 8.0 12/23/2023       Lab Results   Component Value Date    WBC 6.57 10/08/2024    HGB 15.5 10/08/2024    HCT 47.1 10/08/2024    MCV 90.1 10/08/2024     10/08/2024       Images:   No Images in the past 120 days found..    Measures:   Tobacco:   Michael Torrezyles  reports that he has been smoking cigarettes. He has a 57 pack-year smoking history. He has been exposed to tobacco smoke. He has quit using smokeless tobacco.  His smokeless tobacco use included chew and snuff. I have educated him on the risk of diseases from using tobacco products such as cancer, COPD, and heart disease.     I advised him to quit and he is not willing to quit.    I spent 3  minutes counseling the patient.            Assessment / Plan      Assessment/Plan:   70 y.o. male who presented today for follow up of prostate  cancer, grade group 3 status post radical prostatectomy with a positive margin however his PSA has nadired to undetectable and has remained undetectable.  He is in remission.  Follow-up in 1 year with a PSA prior.  YANNICK has completely resolved.  He does not wear pads or diapers.  He has no erectile function but is not interested in treating at this time.    Diagnoses and all orders for this visit:    1. S/P prostatectomy (Primary)  -     PSA Diagnostic; Future    2. History of prostate cancer  -     PSA Diagnostic; Future           Follow Up:   Return in about 1 year (around 3/6/2026) for Follow Up after Labs.    I spent approximately 20 minutes providing clinical care for this patient; including review of patient's chart and provider documentation, face to face time spent with patient in examination room (obtaining history, performing physical exam, discussing diagnosis and management options), placing orders, and completing patient documentation.     Vaibhav Buckner MD  [facility] [place]

## 2025-03-17 ENCOUNTER — OFFICE VISIT (OUTPATIENT)
Dept: FAMILY MEDICINE CLINIC | Facility: CLINIC | Age: 70
End: 2025-03-17
Payer: MEDICARE

## 2025-03-17 VITALS
HEIGHT: 71 IN | HEART RATE: 61 BPM | OXYGEN SATURATION: 96 % | SYSTOLIC BLOOD PRESSURE: 128 MMHG | WEIGHT: 160.4 LBS | DIASTOLIC BLOOD PRESSURE: 78 MMHG | BODY MASS INDEX: 22.46 KG/M2

## 2025-03-17 DIAGNOSIS — I73.9 PAD (PERIPHERAL ARTERY DISEASE): ICD-10-CM

## 2025-03-17 DIAGNOSIS — E11.42 DIABETIC POLYNEUROPATHY ASSOCIATED WITH TYPE 2 DIABETES MELLITUS: Primary | ICD-10-CM

## 2025-03-17 DIAGNOSIS — M79.604 BILATERAL LEG PAIN: ICD-10-CM

## 2025-03-17 DIAGNOSIS — M79.605 BILATERAL LEG PAIN: ICD-10-CM

## 2025-03-17 DIAGNOSIS — R09.89 DECREASED PEDAL PULSES: ICD-10-CM

## 2025-03-17 PROCEDURE — 1159F MED LIST DOCD IN RCRD: CPT | Performed by: FAMILY MEDICINE

## 2025-03-17 PROCEDURE — 99213 OFFICE O/P EST LOW 20 MIN: CPT | Performed by: FAMILY MEDICINE

## 2025-03-17 PROCEDURE — 1126F AMNT PAIN NOTED NONE PRSNT: CPT | Performed by: FAMILY MEDICINE

## 2025-03-17 PROCEDURE — 1160F RVW MEDS BY RX/DR IN RCRD: CPT | Performed by: FAMILY MEDICINE

## 2025-03-17 NOTE — PROGRESS NOTES
Subjective   Michael Bridges is a 70 y.o. male.     History of Present Illness  Here for diabetic foot exam to receive rx for diabetic shoes. Denies ulcers. Does c/o decreased sensation in feet, bilateral foot pain and numbness. Diet controlled DM, but also with CAD, PAD. No recent ANCELMO or arterial doppler BLEs.      The following portions of the patient's history were reviewed and updated as appropriate: allergies, current medications, past family history, past medical history, past social history, past surgical history, and problem list.    Review of Systems   Constitutional:  Positive for fatigue. Negative for appetite change, fever and unexpected weight change.   HENT:  Positive for congestion and postnasal drip. Negative for mouth sores, nosebleeds, rhinorrhea, sore throat and trouble swallowing.    Eyes:  Negative for visual disturbance.   Respiratory:  Positive for cough (dry, intermittent) and shortness of breath (mild with exertion). Negative for wheezing.    Cardiovascular:  Positive for leg swelling (mild, stable). Negative for chest pain and palpitations.   Gastrointestinal:  Negative for abdominal pain, constipation, nausea and vomiting.   Endocrine: Positive for cold intolerance. Negative for polydipsia and polyuria.   Genitourinary:  Negative for decreased urine volume, dysuria and hematuria.   Musculoskeletal:  Positive for arthralgias, back pain, gait problem, myalgias, neck pain and neck stiffness.   Skin:  Negative for rash and wound.   Neurological:  Positive for dizziness, tremors, weakness, numbness and headaches. Negative for syncope.   Hematological:  Negative for adenopathy. Does not bruise/bleed easily.   Psychiatric/Behavioral:  Positive for confusion (mild, intermittent) and sleep disturbance. Negative for dysphoric mood. The patient is nervous/anxious.    Pt's previous ROS reviewed and updated as indicated.       Objective   Vitals:    03/17/25 1434   BP: 128/78   Pulse: 61   SpO2:  96%     Body mass index is 22.37 kg/m².      03/17/25  1434   Weight: 72.8 kg (160 lb 6.4 oz)       Physical Exam  Vitals and nursing note reviewed.   Constitutional:       General: He is not in acute distress.     Appearance: He is well-groomed and normal weight. He is not ill-appearing.      Comments: Appears older than stated age.   HENT:      Mouth/Throat:      Mouth: Mucous membranes are moist.   Cardiovascular:      Rate and Rhythm: Normal rate and regular rhythm.      Pulses:           Dorsalis pedis pulses are 1+ on the right side and 1+ on the left side.        Posterior tibial pulses are 1+ on the right side and 1+ on the left side.      Heart sounds: Heart sounds are distant.   Pulmonary:      Effort: Pulmonary effort is normal.      Breath sounds: Decreased breath sounds present. No wheezing, rhonchi or rales.   Abdominal:      General: Abdomen is scaphoid.   Musculoskeletal:      Right lower leg: No edema.      Left lower leg: No edema.   Feet:      Right foot:      Skin integrity: Callus (minor) present. No ulcer or blister.      Toenail Condition: Right toenails are abnormally thick. Fungal disease present.     Left foot:      Skin integrity: Callus (minor) present. No ulcer or blister.      Toenail Condition: Left toenails are abnormally thick. Fungal disease present.     Comments: Diffusely decreased protective sensation bilaterally  Skin:     General: Skin is warm and dry.      Coloration: Skin is not jaundiced or pale.      Findings: No bruising or rash.   Neurological:      Mental Status: He is alert and oriented to person, place, and time. Mental status is at baseline.      Motor: Weakness (lower extremities, appears at baseline) present.      Gait: Gait abnormal (antalgic, requires assistance, using single prong cane).   Psychiatric:         Behavior: Behavior normal. Behavior is cooperative.         Cognition and Memory: Cognition normal.     Pt's previous physical exam reviewed and updated as  indicated.    Lab Results   Component Value Date    HGBA1C 5.90 (H) 10/08/2024    HGBA1C 5.70 (H) 04/08/2024    HGBA1C 5.80 (H) 12/15/2023     Lab Results   Component Value Date    MICROALBUR 15.6 04/08/2024    CREATININE 0.94 10/08/2024     Lab Results   Component Value Date    CHOL 182 06/13/2016    CHLPL 175 10/08/2024    TRIG 198 (H) 10/08/2024    HDL 58 10/08/2024    LDL 84 10/08/2024       Assessment & Plan   Diagnoses and all orders for this visit:    1. Diabetic polyneuropathy associated with type 2 diabetes mellitus (Primary)    2. Bilateral leg pain  -     Doppler Arterial Multi Level Lower Extremity - Bilateral CAR; Future    3. Decreased pedal pulses  -     Doppler Arterial Multi Level Lower Extremity - Bilateral CAR; Future    4. PAD (peripheral artery disease)  -     Doppler Arterial Multi Level Lower Extremity - Bilateral CAR; Future       Continue statin, ASA. Doppler as above.  Rx for diabetic shoes provided.  Routine f/u as scheduled, f/u sooner as needed/instructed.  I will contact patient regarding test results and provide instructions regarding any necessary changes in plan of care.  Patient was encouraged to keep me informed of any acute changes, lack of improvement, or any new concerning symptoms.  Pt is aware of reasons to seek emergent care.  Patient voiced understanding of all instructions and denied further questions.    Please note that portions of this note may have been completed with a voice recognition program.

## 2025-03-19 ENCOUNTER — PATIENT OUTREACH (OUTPATIENT)
Facility: HOSPITAL | Age: 70
End: 2025-03-19
Payer: MEDICARE

## 2025-03-19 ENCOUNTER — HOSPITAL ENCOUNTER (OUTPATIENT)
Dept: CT IMAGING | Facility: HOSPITAL | Age: 70
Discharge: HOME | End: 2025-03-19
Admitting: FAMILY MEDICINE
Payer: MEDICARE

## 2025-03-19 DIAGNOSIS — Z87.891 PERSONAL HISTORY OF SMOKING: ICD-10-CM

## 2025-03-19 DIAGNOSIS — F17.200 ENCOUNTER FOR SCREENING FOR MALIGNANT NEOPLASM OF LUNG IN CURRENT SMOKER WITH 30 PACK YEAR HISTORY OR GREATER: ICD-10-CM

## 2025-03-19 DIAGNOSIS — Z12.2 ENCOUNTER FOR SCREENING FOR MALIGNANT NEOPLASM OF LUNG IN CURRENT SMOKER WITH 30 PACK YEAR HISTORY OR GREATER: ICD-10-CM

## 2025-03-19 PROCEDURE — 71271 CT THORAX LUNG CANCER SCR C-: CPT

## 2025-03-20 ENCOUNTER — TELEPHONE (OUTPATIENT)
Dept: FAMILY MEDICINE CLINIC | Facility: CLINIC | Age: 70
End: 2025-03-20
Payer: MEDICARE

## 2025-03-20 NOTE — TELEPHONE ENCOUNTER
Caller: Anabelle Bridges    Relationship: Emergency Contact    Best call back number: [phone]     What test was performed: CT SCAN    When was the test performed: 3/19/25    Where was the test performed: [facility]

## 2025-03-24 ENCOUNTER — TELEPHONE (OUTPATIENT)
Dept: FAMILY MEDICINE CLINIC | Facility: CLINIC | Age: 70
End: 2025-03-24

## 2025-03-24 DIAGNOSIS — I73.9 PAD (PERIPHERAL ARTERY DISEASE): ICD-10-CM

## 2025-03-24 DIAGNOSIS — M79.605 BILATERAL LEG PAIN: ICD-10-CM

## 2025-03-24 DIAGNOSIS — R09.89 DECREASED PEDAL PULSES: ICD-10-CM

## 2025-03-24 DIAGNOSIS — M79.604 BILATERAL LEG PAIN: ICD-10-CM

## 2025-03-24 NOTE — TELEPHONE ENCOUNTER
Caller: Anabelle Bridges    Relationship: Emergency Contact    Best call back number: [phone]    What test was performed: CT OF LUNGS    When was the test performed: 03/19/25    Where was the test performed: [facility]    Additional notes:

## 2025-03-26 NOTE — TELEPHONE ENCOUNTER
PATIENTS WIFE CALLED BACK TO STATED THE LAB RESULTS ARE BACK IN PATIENTS MYCHART. BUT SHE NEEDS PCP TO CALL BACK TO ADVISE WHAT THEY MEAN [phone].

## 2025-04-14 ENCOUNTER — OFFICE VISIT (OUTPATIENT)
Dept: FAMILY MEDICINE CLINIC | Facility: CLINIC | Age: 70
End: 2025-04-14
Payer: MEDICARE

## 2025-04-14 VITALS
WEIGHT: 161.4 LBS | HEIGHT: 71 IN | DIASTOLIC BLOOD PRESSURE: 72 MMHG | BODY MASS INDEX: 22.6 KG/M2 | HEART RATE: 77 BPM | SYSTOLIC BLOOD PRESSURE: 124 MMHG | OXYGEN SATURATION: 94 %

## 2025-04-14 DIAGNOSIS — Z90.79 S/P PROSTATECTOMY: ICD-10-CM

## 2025-04-14 DIAGNOSIS — C61 PROSTATE CANCER: ICD-10-CM

## 2025-04-14 DIAGNOSIS — R26.9 ABNORMAL GAIT: ICD-10-CM

## 2025-04-14 DIAGNOSIS — E11.9 WELL CONTROLLED DIABETES MELLITUS: ICD-10-CM

## 2025-04-14 DIAGNOSIS — M51.369 DEGENERATION OF INTERVERTEBRAL DISC OF LUMBAR REGION, UNSPECIFIED WHETHER PAIN PRESENT: ICD-10-CM

## 2025-04-14 DIAGNOSIS — J30.89 NON-SEASONAL ALLERGIC RHINITIS, UNSPECIFIED TRIGGER: ICD-10-CM

## 2025-04-14 DIAGNOSIS — Z12.11 SCREEN FOR COLON CANCER: ICD-10-CM

## 2025-04-14 DIAGNOSIS — E11.42 DIABETIC POLYNEUROPATHY ASSOCIATED WITH TYPE 2 DIABETES MELLITUS: ICD-10-CM

## 2025-04-14 DIAGNOSIS — G25.81 RESTLESS LEGS SYNDROME: ICD-10-CM

## 2025-04-14 DIAGNOSIS — K21.9 GASTROESOPHAGEAL REFLUX DISEASE, UNSPECIFIED WHETHER ESOPHAGITIS PRESENT: ICD-10-CM

## 2025-04-14 DIAGNOSIS — G47.09 OTHER INSOMNIA: ICD-10-CM

## 2025-04-14 DIAGNOSIS — Z00.00 MEDICARE ANNUAL WELLNESS VISIT, SUBSEQUENT: Primary | ICD-10-CM

## 2025-04-14 DIAGNOSIS — M48.062 SPINAL STENOSIS OF LUMBAR REGION WITH NEUROGENIC CLAUDICATION: ICD-10-CM

## 2025-04-14 DIAGNOSIS — M48.02 SPINAL STENOSIS OF CERVICAL REGION: ICD-10-CM

## 2025-04-14 DIAGNOSIS — J43.8 OTHER EMPHYSEMA: ICD-10-CM

## 2025-04-14 DIAGNOSIS — M47.892 OTHER OSTEOARTHRITIS OF SPINE, CERVICAL REGION: ICD-10-CM

## 2025-04-14 DIAGNOSIS — G62.9 PERIPHERAL POLYNEUROPATHY: ICD-10-CM

## 2025-04-14 DIAGNOSIS — K59.00 CONSTIPATION, UNSPECIFIED CONSTIPATION TYPE: ICD-10-CM

## 2025-04-14 DIAGNOSIS — G89.4 CHRONIC PAIN SYNDROME: ICD-10-CM

## 2025-04-14 DIAGNOSIS — E53.8 COBALAMIN DEFICIENCY: ICD-10-CM

## 2025-04-14 DIAGNOSIS — M50.30 DEGENERATION OF INTERVERTEBRAL DISC OF CERVICAL REGION: ICD-10-CM

## 2025-04-14 DIAGNOSIS — Q62.5 CONGENITAL DUPLICATION OF RENAL COLLECTING SYSTEM: ICD-10-CM

## 2025-04-14 DIAGNOSIS — I25.10 CHRONIC CORONARY ARTERY DISEASE: ICD-10-CM

## 2025-04-14 DIAGNOSIS — F17.200 TOBACCO USE DISORDER: ICD-10-CM

## 2025-04-14 DIAGNOSIS — E78.2 MIXED HYPERLIPIDEMIA: ICD-10-CM

## 2025-04-14 DIAGNOSIS — E55.9 VITAMIN D DEFICIENCY: ICD-10-CM

## 2025-04-14 DIAGNOSIS — Z51.81 MEDICATION MONITORING ENCOUNTER: ICD-10-CM

## 2025-04-14 RX ADMIN — CYANOCOBALAMIN 1000 MCG: 1000 INJECTION, SOLUTION INTRAMUSCULAR; SUBCUTANEOUS at 17:12

## 2025-04-14 NOTE — ASSESSMENT & PLAN NOTE
Orders:    Comprehensive Metabolic Panel    Hemoglobin A1c    Microalbumin / Creatinine Urine Ratio - Urine, Clean Catch    Ambulatory Referral for Diabetic Eye Exam-Optometry

## 2025-04-14 NOTE — PROGRESS NOTES
Subjective   The ABCs of the Annual Wellness Visit  Medicare Wellness Visit      Michael Bridges is a 70 y.o. patient who presents for a Medicare Wellness Visit.    The following portions of the patient's history were reviewed and   updated as appropriate: allergies, current medications, past family history, past medical history, past social history, past surgical history, and problem list.    Compared to one year ago, the patient's physical   health is worse.  Compared to one year ago, the patient's mental   health is the same.    Recent Hospitalizations:  He was not admitted to the hospital during the last year.     Current Medical Providers:  Patient Care Team:  Diana Priest MD as PCP - General (Family Medicine)  Breezy Noel II, MD as Consulting Physician (Pain Medicine)  Cheryl Marin MD as Consulting Physician (Cardiology)  Laura Noguera PA-C as Physician Assistant (Urology)  Ramiro Coe MD as Referring Physician (Urology)  Norm Soliman MD as Consulting Physician (Radiation Oncology)    Outpatient Medications Prior to Visit   Medication Sig Dispense Refill    aspirin 81 MG EC tablet Take 1 tablet by mouth Daily. 30 tablet 11    azelastine (ASTELIN) 0.1 % nasal spray 2 sprays into the nostril(s) as directed by provider 2 (Two) Times a Day. Use in each nostril as directed 30 mL 5    Combivent Respimat  MCG/ACT inhaler INHALE 1 PUFF BY MOUTH 4 (FOUR) TIMES A DAY. (Patient taking differently: 1 puff 4 (Four) Times a Day.) 4 g 10    enalapril (VASOTEC) 10 MG tablet Take 1 tablet by mouth 2 (Two) Times a Day.  5    flunisolide (NASALIDE) 25 MCG/ACT (0.025%) solution nasal spray Inhale 2 sprays Daily. 1 bottle 5    gabapentin (NEURONTIN) 600 MG tablet       methocarbamol (ROBAXIN) 500 MG tablet Take 1 tablet by mouth 2 (Two) Times a Day. 30 tablet 0    montelukast (SINGULAIR) 10 MG tablet Take 1 tablet by mouth Every Night.      omeprazole (priLOSEC) 40 MG  capsule TAKE 1 CAPSULE BY MOUTH ONCE DAILY 90 capsule 0    oxyCODONE (ROXICODONE) 10 MG tablet 1 tablet Every 8 (Eight) Hours As Needed for Moderate Pain. TAKES 3 A DAY      polyethylene glycol (MIRALAX) 17 g packet Take 17 g by mouth Daily. (Patient taking differently: Take 17 g by mouth Daily As Needed (CONSTIPATION).) 30 packet 11    simvastatin (ZOCOR) 40 MG tablet Take 1 tablet by mouth Every Night.      vitamin D3 125 MCG (5000 UT) capsule capsule TAKE 1 CAPSULE BY MOUTH ONCE DAILY 30 capsule 10     Facility-Administered Medications Prior to Visit   Medication Dose Route Frequency Provider Last Rate Last Admin    cyanocobalamin injection 1,000 mcg  1,000 mcg Intramuscular Weekly Diana Priest MD   1,000 mcg at 04/14/25 1712     Opioid medication/s are on active medication list.  and I have evaluated his active treatment plan and pain score trends (see table).  Vitals:    04/14/25 1016   PainSc: 5    PainLoc: Leg     I have reviewed the chart for potential of high risk medication and harmful drug interactions in the elderly.        Aspirin is on active medication list. Aspirin use is indicated based on review of current medical condition/s. Pros and cons of this therapy have been discussed today. Benefits of this medication outweigh potential harm.  Patient has been encouraged to continue taking this medication.  .      Patient Active Problem List   Diagnosis    Atopic rhinitis    Osteoarthritis of cervical spine    Chronic pain syndrome    Well controlled diabetes mellitus    Chronic coronary artery disease    Degeneration of intervertebral disc of cervical region    Gastroesophageal reflux disease    Abnormal gait    Hyperlipidemia    Insomnia    Degeneration of intervertebral disc of lumbar region    Lumbar radiculopathy    Spinal stenosis of lumbar region    Neuropathic pain syndrome (non-herpetic)    Peripheral neuropathy    Pulmonary emphysema    Restless legs syndrome    Spinal stenosis of cervical  "region    Tremor    Cobalamin deficiency    Vitamin D deficiency    Muscle spasm    Nodule of right lung    Chronic daily headache    Congenital duplication of renal collecting system    Chronic back pain    Colonoscopy refused    Chronic prescription opiate use    Diabetic polyneuropathy associated with type 2 diabetes mellitus    Prostate cancer    Tobacco use disorder    S/P prostatectomy     Advance Care Planning Advance Directive is not on file.  ACP discussion was held with the patient during this visit. Patient does not have an advance directive, information provided.            Objective   Vitals:    04/14/25 1016   BP: 124/72   Pulse: 77   SpO2: 94%   Weight: 73.2 kg (161 lb 6.4 oz)   Height: 180.3 cm (71\")   PainSc: 5    PainLoc: Leg       Estimated body mass index is 22.51 kg/m² as calculated from the following:    Height as of this encounter: 180.3 cm (71\").    Weight as of this encounter: 73.2 kg (161 lb 6.4 oz).    BMI is within normal parameters. No other follow-up for BMI required.           Does the patient have evidence of cognitive impairment? No  Lab Results   Component Value Date    CHLPL 166 04/14/2025    TRIG 156 (H) 04/14/2025    HDL 52 04/14/2025    LDL 87 04/14/2025    VLDL 27 04/14/2025    HGBA1C 7.90 (H) 04/14/2025                                                                                                Health  Risk Assessment    Smoking Status:  Social History     Tobacco Use   Smoking Status Every Day    Current packs/day: 1.00    Average packs/day: 1 pack/day for 57.0 years (57.0 ttl pk-yrs)    Types: Cigarettes    Passive exposure: Current   Smokeless Tobacco Former    Types: Chew, Snuff   Tobacco Comments    has smoked up to 2-3 ppd intermittently     Alcohol Consumption:  Social History     Substance and Sexual Activity   Alcohol Use No       Fall Risk Screen  STEADI Fall Risk Assessment was completed, and patient is at LOW risk for falls.Assessment completed " on:2025    Depression Screening   Little interest or pleasure in doing things? Not at all   Feeling down, depressed, or hopeless? Not at all   PHQ-2 Total Score 0      Health Habits and Functional and Cognitive Screenin/14/2025    10:16 AM   Functional & Cognitive Status   Do you have difficulty preparing food and eating? No   Do you have difficulty bathing yourself, getting dressed or grooming yourself? No   Do you have difficulty using the toilet? No   Do you have difficulty moving around from place to place? No   Do you have trouble with steps or getting out of a bed or a chair? Yes   Current Diet Well Balanced Diet   Dental Exam Other   Eye Exam Up to date   Exercise (times per week) 5 times per week   Current Exercises Include Walking   Do you need help using the phone?  No   Are you deaf or do you have serious difficulty hearing?  No   Do you need help to go to places out of walking distance? No   Do you need help shopping? No   Do you need help preparing meals?  No   Do you need help with housework?  Yes   Do you need help with laundry? Yes   Do you need help taking your medications? No   Do you need help managing money? No   Do you ever drive or ride in a car without wearing a seat belt? No   Have you felt unusual stress, anger or loneliness in the last month? No   Who do you live with? Spouse   If you need help, do you have trouble finding someone available to you? No   Have you been bothered in the last four weeks by sexual problems? No   Do you have difficulty concentrating, remembering or making decisions? No           Age-appropriate Screening Schedule:  Refer to the list below for future screening recommendations based on patient's age, sex and/or medical conditions. Orders for these recommended tests are listed in the plan section. The patient has been provided with a written plan.    Health Maintenance List  Health Maintenance   Topic Date Due    ZOSTER VACCINE (1 of 2) Never done     COLORECTAL CANCER SCREENING  01/01/2010    DIABETIC EYE EXAM  07/01/2024    COVID-19 Vaccine (3 - 2024-25 season) 09/01/2024    INFLUENZA VACCINE  07/01/2025    HEMOGLOBIN A1C  10/14/2025    LUNG CANCER SCREENING  03/19/2026    ANNUAL WELLNESS VISIT  04/14/2026    LIPID PANEL  04/14/2026    URINE MICROALBUMIN-CREATININE RATIO (uACR)  04/14/2026    TDAP/TD VACCINES (3 - Td or Tdap) 01/17/2028    HEPATITIS C SCREENING  Completed    Pneumococcal Vaccine 50+  Completed    AAA SCREEN ONCE  Completed                                                                                                                                                CMS Preventative Services Quick Reference  Risk Factors Identified During Encounter  Chronic Pain: Natural history and expected course discussed. Questions answered.  Depression/Dysphoria: Current medication is effective, no change recommended  Fall Risk-High or Moderate: Discussed Fall Prevention in the home and Information on Fall Prevention Shared in After Visit Summary  Hearing Problem:  pt declines referral to audiology  Immunizations Discussed/Encouraged: Tdap, Influenza, Pneumococcal 23, Prevnar 20 (Pneumococcal 20-valent conjugate), Shingrix, COVID19, and RSV (Respiratory Syncytial Virus)  Inactivity/Sedentary: Patient was advised to exercise at least 150 minutes a week per CDC recommendations.  Tobacco Use/Dependance Risk (use dotphrase .tobaccocessation for documentation)    The above risks/problems have been discussed with the patient.  Pertinent information has been shared with the patient in the After Visit Summary.  An After Visit Summary and PPPS were made available to the patient.    Follow Up:   Next Medicare Wellness visit to be scheduled in 1 year.         Additional E&M Note during same encounter follows:  Patient has additional, significant, and separately identifiable condition(s)/problem(s) that require work above and beyond the Medicare Wellness Visit     Chief  Complaint  Medicare Wellness-subsequent    Subjective   HPI  Michael is also being seen today for additional medical problem/s.    Has well controlled diabetes mellitus. Complains of decreased sensation in both feet, denies open wounds or ulcers. Blood sugar rarely over 200.     He has emphysema/COPD and is a chronic smoker.  Smoking approximate 1 pack/day. Declines smoking cessation counseling.      Chronic conditions include CAD, HLP, B12 def, vit D def, PAD. On ASA, statin, enalapril. Denies new symptoms.     Followed by urology for prostate CA. Recent good report.     Eye check overdue     On vitamin D and B12 replacement.     Has cologuard at home but has not completed and has been over 12 months since receiving it.      Pt's previous HPI reviewed and updated as indicated.     Review of Systems   Constitutional:  Positive for fatigue. Negative for fever.   HENT:  Positive for congestion and postnasal drip. Negative for mouth sores, nosebleeds and trouble swallowing.    Eyes:  Positive for visual disturbance (chronic stable).   Respiratory:  Positive for cough. Negative for shortness of breath and wheezing.    Cardiovascular:  Positive for leg swelling. Negative for chest pain and palpitations.   Gastrointestinal:  Negative for abdominal pain, blood in stool, diarrhea, nausea and vomiting.   Endocrine: Positive for cold intolerance.   Genitourinary:  Negative for difficulty urinating, dysuria, hematuria, scrotal swelling and testicular pain.   Musculoskeletal:  Positive for arthralgias, back pain, gait problem, joint swelling, myalgias, neck pain and neck stiffness.   Skin:  Negative for rash and wound.   Neurological:  Positive for dizziness, tremors, weakness, numbness and confusion (occ mild). Negative for syncope.   Hematological:  Negative for adenopathy. Does not bruise/bleed easily.   Psychiatric/Behavioral:  Positive for sleep disturbance. Negative for dysphoric mood. The patient is nervous/anxious.   "  Pt's previous ROS reviewed and updated as indicated.                Objective   Vital Signs:  /72   Pulse 77   Ht 180.3 cm (71\")   Wt 73.2 kg (161 lb 6.4 oz)   SpO2 94%   BMI 22.51 kg/m²   Physical Exam  Vitals and nursing note reviewed.   Constitutional:       General: He is not in acute distress.     Appearance: He is well-groomed and normal weight. He is not ill-appearing.      Comments: Appears older than stated age.   HENT:      Head: Atraumatic.      Mouth/Throat:      Mouth: Mucous membranes are moist.   Eyes:      General: No scleral icterus.     Conjunctiva/sclera: Conjunctivae normal.   Neck:      Thyroid: No thyroid mass.   Cardiovascular:      Rate and Rhythm: Normal rate and regular rhythm.      Pulses:           Dorsalis pedis pulses are 1+ on the right side and 1+ on the left side.        Posterior tibial pulses are 1+ on the right side and 1+ on the left side.      Heart sounds: Heart sounds are distant.   Pulmonary:      Effort: Pulmonary effort is normal.      Breath sounds: Decreased breath sounds present. No wheezing, rhonchi or rales.   Abdominal:      General: Abdomen is scaphoid. There is no distension.      Palpations: Abdomen is soft.      Tenderness: There is no abdominal tenderness.   Musculoskeletal:      Right lower leg: No edema.      Left lower leg: No edema.   Lymphadenopathy:      Cervical: No cervical adenopathy.   Skin:     General: Skin is warm and dry.      Coloration: Skin is not cyanotic, jaundiced or pale.      Findings: No bruising or rash.   Neurological:      Mental Status: He is alert and oriented to person, place, and time. Mental status is at baseline.      Motor: Weakness (lower extremities, appears at baseline) present.      Gait: Gait abnormal (antalgic, requires assistance, using single prong cane).   Psychiatric:         Mood and Affect: Mood and affect normal.         Behavior: Behavior normal. Behavior is cooperative.         Cognition and Memory: " Cognition normal.     Pt's previous physical exam reviewed and updated as indicated.                 Assessment and Plan      Medicare annual wellness visit, subsequent         Well controlled diabetes mellitus    Orders:    Comprehensive Metabolic Panel    Hemoglobin A1c    Microalbumin / Creatinine Urine Ratio - Urine, Clean Catch    Ambulatory Referral for Diabetic Eye Exam-Optometry    Mixed hyperlipidemia       Orders:    Comprehensive Metabolic Panel    Lipid Panel    Cobalamin deficiency    Orders:    Vitamin B12    Medication monitoring encounter    Orders:    Comprehensive Metabolic Panel    Non-seasonal allergic rhinitis, unspecified trigger         Chronic coronary artery disease  Coronary Artery Disease (OPTIONAL): Coronary artery disease is stable.  Continue current treatment regimen.  Cardiac status will be reassessed in 6 months.         Vitamin D deficiency         Gastroesophageal reflux disease, unspecified whether esophagitis present         Constipation, unspecified constipation type         S/P prostatectomy         Congenital duplication of renal collecting system  Renal condition is stable.  Continue current treatment regimen.  Renal condition will be reassessed in 6 months.         Prostate cancer         Tobacco use disorder  Tobacco cessation advised.  Pt voiced understanding of health risks of cont'd tobacco use.       Abnormal gait         Other insomnia         Other emphysema  COPD is stable.    Plan:  Continue same medication/s without change.    Discussed medication dosage, use, side effects, and goals of treatment in detail.    Warning signs of respiratory distress were reviewed with the patient. .    Patient Treatment Goals:   symptom prevention, minimizing limitation in activity, prevention of exacerbations and use of ER/inpatient care, maintenance of optimal pulmonary function, and minimization of adverse effects of treatment    Followup in 6 months.         Chronic pain  syndrome         Degeneration of intervertebral disc of cervical region         Degeneration of intervertebral disc of lumbar region, unspecified whether pain present         Diabetic polyneuropathy associated with type 2 diabetes mellitus  Diabetes is stable.   Continue current treatment regimen.  Diabetes will be reassessed in 6 months         Other osteoarthritis of spine, cervical region         Restless legs syndrome         Peripheral polyneuropathy         Spinal stenosis of lumbar region with neurogenic claudication         Spinal stenosis of cervical region         Screen for colon cancer  Risks/Benefits of colon cancer screening options reviewed. Pt voiced understanding and wishes to proceed with cologuard.    Orders:    Cologuard - Stool, Per Rectum; Future            Follow Up   Return in about 6 months (around 10/14/2025).  F/u sooner as needed/instructed.  I will contact patient regarding test results and provide instructions regarding any necessary changes in plan of care.  Patient was encouraged to keep me informed of any acute changes, lack of improvement, or any new concerning symptoms.  Pt is aware of reasons to seek emergent care.  Patient voiced understanding of all instructions and denied further questions.    Patient was given instructions and counseling regarding his condition or for health maintenance advice. Please see specific information pulled into the AVS if appropriate.    Please note that portions of this note may have been completed with a voice recognition program.

## 2025-04-14 NOTE — PATIENT INSTRUCTIONS
Medicare Wellness  Personal Prevention Plan of Service     Date of Office Visit:    Encounter Provider:  Diana Priest MD  Place of Service:  [facility]  Patient Name: Michael Bridges  :  1955    As part of the Medicare Wellness portion of your visit today, we are providing you with this personalized preventive plan of services (PPPS). This plan is based upon recommendations of the [place] Preventive Services Task Force (USPSTF) and the Advisory Committee on Immunization Practices (ACIP).    This lists the preventive care services that should be considered, and provides dates of when you are due. Items listed as completed are up-to-date and do not require any further intervention.    Health Maintenance   Topic Date Due    ZOSTER VACCINE (1 of 2) Never done    COLORECTAL CANCER SCREENING  2010    DIABETIC EYE EXAM  2024    COVID-19 Vaccine (3 - 2024-25 season) 2024    HEMOGLOBIN A1C  2025    URINE MICROALBUMIN-CREATININE RATIO (uACR)  2025    INFLUENZA VACCINE  2025    LIPID PANEL  10/08/2025    LUNG CANCER SCREENING  2026    ANNUAL WELLNESS VISIT  2026    TDAP/TD VACCINES (3 - Td or Tdap) 2028    HEPATITIS C SCREENING  Completed    Pneumococcal Vaccine 50+  Completed    AAA SCREEN ONCE  Completed       Orders Placed This Encounter   Procedures    Comprehensive Metabolic Panel     Release to patient:   Routine Release [[id number]]    Lipid Panel     Release to patient:   Routine Release [[id number]]    Hemoglobin A1c     Release to patient:   Routine Release [[id number]]    Microalbumin / Creatinine Urine Ratio - Urine, Clean Catch     Release to patient:   Routine Release [[id number]]    Vitamin B12     Release to patient:   Routine Release [[id number]]       Return in about 6 months (around 10/14/2025).

## 2025-04-15 LAB
ALBUMIN SERPL-MCNC: 4 G/DL (ref 3.5–5.2)
ALBUMIN/CREAT UR: 4 MG/G CREAT (ref 0–29)
ALBUMIN/GLOB SERPL: 1.8 G/DL
ALP SERPL-CCNC: 63 U/L (ref 39–117)
ALT SERPL-CCNC: 14 U/L (ref 1–41)
AST SERPL-CCNC: 22 U/L (ref 1–40)
BILIRUB SERPL-MCNC: 0.4 MG/DL (ref 0–1.2)
BUN SERPL-MCNC: 9 MG/DL (ref 8–23)
BUN/CREAT SERPL: 8.9 (ref 7–25)
CALCIUM SERPL-MCNC: 9.3 MG/DL (ref 8.6–10.5)
CHLORIDE SERPL-SCNC: 104 MMOL/L (ref 98–107)
CHOLEST SERPL-MCNC: 166 MG/DL (ref 0–200)
CO2 SERPL-SCNC: 25.9 MMOL/L (ref 22–29)
CREAT SERPL-MCNC: 1.01 MG/DL (ref 0.76–1.27)
CREAT UR-MCNC: 187.3 MG/DL
EGFRCR SERPLBLD CKD-EPI 2021: 80 ML/MIN/1.73
GLOBULIN SER CALC-MCNC: 2.2 GM/DL
GLUCOSE SERPL-MCNC: 151 MG/DL (ref 65–99)
HBA1C MFR BLD: 7.9 % (ref 4.8–5.6)
HDLC SERPL-MCNC: 52 MG/DL (ref 40–60)
LDLC SERPL CALC-MCNC: 87 MG/DL (ref 0–100)
MICROALBUMIN UR-MCNC: 8.3 UG/ML
POTASSIUM SERPL-SCNC: 4.5 MMOL/L (ref 3.5–5.2)
PROT SERPL-MCNC: 6.2 G/DL (ref 6–8.5)
SODIUM SERPL-SCNC: 140 MMOL/L (ref 136–145)
TRIGL SERPL-MCNC: 156 MG/DL (ref 0–150)
VIT B12 SERPL-MCNC: 273 PG/ML (ref 211–946)
VLDLC SERPL CALC-MCNC: 27 MG/DL (ref 5–40)

## 2025-04-21 PROBLEM — N40.0 BENIGN PROSTATIC HYPERPLASIA: Status: RESOLVED | Noted: 2017-09-26 | Resolved: 2025-04-21

## 2025-04-21 NOTE — ASSESSMENT & PLAN NOTE
COPD is stable.    Plan:  Continue same medication/s without change.    Discussed medication dosage, use, side effects, and goals of treatment in detail.    Warning signs of respiratory distress were reviewed with the patient. .    Patient Treatment Goals:   symptom prevention, minimizing limitation in activity, prevention of exacerbations and use of ER/inpatient care, maintenance of optimal pulmonary function, and minimization of adverse effects of treatment    Followup in 6 months.

## 2025-04-21 NOTE — ASSESSMENT & PLAN NOTE
Coronary Artery Disease (OPTIONAL): Coronary artery disease is stable.  Continue current treatment regimen.  Cardiac status will be reassessed in 6 months.

## 2025-04-28 RX ORDER — OMEPRAZOLE 40 MG/1
40 CAPSULE, DELAYED RELEASE ORAL DAILY
Qty: 90 CAPSULE | Refills: 0 | Status: SHIPPED | OUTPATIENT
Start: 2025-04-28

## 2025-05-14 ENCOUNTER — TELEPHONE (OUTPATIENT)
Dept: SURGERY | Facility: CLINIC | Age: 70
End: 2025-05-14

## 2025-05-14 RX ORDER — BISACODYL 5 MG
TABLET, DELAYED RELEASE (ENTERIC COATED) ORAL
Qty: 4 TABLET | Refills: 0 | Status: SHIPPED | OUTPATIENT
Start: 2025-05-14

## 2025-05-14 RX ORDER — POLYETHYLENE GLYCOL 3350 17 G/17G
POWDER, FOR SOLUTION ORAL
Qty: 238 G | Refills: 0 | Status: SHIPPED | OUTPATIENT
Start: 2025-05-14

## 2025-05-14 NOTE — TELEPHONE ENCOUNTER
PRESCREENING FOR OPEN ACCESS SCHEDULING    Michael Bridges, 1955  [id number]    05/14/25    If, the patient answers yes to any of the following questions the provider will be informed prior to scheduling open access for approval and documented in the chart.    []  Yes  [x] No    1. Have you ever had a colonoscopy in the past?      When:        Where:       Polyps or other:     []  Yes  [x] No    2. Family history of colon cancer?      Relation:       Age of onset:       Do you currently have any of the following?    []  Yes  [x] No  Rectal bleeding, if so, how long?     []  Yes  [x] No  Abdominal pain, if so, how long?    []  Yes  [x] No  Constipation, if so, how long?    []  Yes  [x] No  Diarrhea, if so, how long?    []  Yes  [x] No  Weight loss, is so, how much?    [] Yes  [x] No  Small caliber stool, if so, how long?    []Yes  [x] No  Do you have Hemorroids?    []Yes  [x] No  Have you been diagnosed with Anemia?    []Yes  [x] No  Do you have difficulty swallowing?  []Yes  [x] No  Do you have a history of esophageal stricture or dilation?(If yes do not schedule with Dr Medina)    []Yes  [x] No  Do you have acid reflux?    Have you ever had any of the following conditions?    [] Yes  [x] No  Heart attack?      When?       Last cardiac workup?     Current Cardiologist?     Blood thinners?    [] Yes  [x] No   Lung problems, asthma or COPD?  [] Yes  [x] No  Oxygen required?       [] Yes  [x] No  Stroke?     [] Yes  [x] No  Have you ever had a reaction to anesthesia?

## 2025-05-14 NOTE — TELEPHONE ENCOUNTER
Pt is scheduled at [facility] for colonoscopy on 6/30/25 with Dr Good.  Miralax prep sent to pharmacy.  Chart reviewed and ok to proceed as open access.  Written in OR book, case requested, instructions mailed.

## 2025-05-15 ENCOUNTER — PREP FOR SURGERY (OUTPATIENT)
Dept: OTHER | Facility: HOSPITAL | Age: 70
End: 2025-05-15
Payer: MEDICARE

## 2025-05-15 DIAGNOSIS — R19.5 HEME POSITIVE STOOL: Primary | ICD-10-CM

## 2025-06-25 ENCOUNTER — TELEPHONE (OUTPATIENT)
Dept: SURGERY | Facility: CLINIC | Age: 70
End: 2025-06-25
Payer: MEDICARE

## 2025-06-25 NOTE — TELEPHONE ENCOUNTER
Patient wife called and wants to reschedule her procedure that is on for 6/30/25 due to transportation. Verified contact #.

## 2025-07-18 ENCOUNTER — TELEPHONE (OUTPATIENT)
Dept: SURGERY | Facility: CLINIC | Age: 70
End: 2025-07-18
Payer: MEDICARE

## 2025-07-18 NOTE — TELEPHONE ENCOUNTER
SPOKE WITH PT'S WIFE SHE STATED THEY NEED TO CANCEL THE COLONOSCOPY ON 07/21/2025 @ [facility] WITH DR RENO BECAUSE A REE FELL ON THERE VAN AN THEY HAVE NO TRANSPORTATION. THEY WILL CALL US BACK WHEN THEY CAN RESCHEDULE. GARY IN [facility] WAS NOTIFIED

## 2025-08-01 RX ORDER — OMEPRAZOLE 40 MG/1
40 CAPSULE, DELAYED RELEASE ORAL DAILY
Qty: 90 CAPSULE | Refills: 0 | Status: SHIPPED | OUTPATIENT
Start: 2025-08-01

## 2025-08-01 RX ORDER — OMEPRAZOLE 40 MG/1
40 CAPSULE, DELAYED RELEASE ORAL DAILY
Qty: 90 CAPSULE | Refills: 0 | OUTPATIENT
Start: 2025-08-01

## 2025-08-01 NOTE — TELEPHONE ENCOUNTER
Caller: Anabelle Bridges    Relationship: Emergency Contact    Best call back number: [phone]     Requested Prescriptions:   Requested Prescriptions     Pending Prescriptions Disp Refills    omeprazole (priLOSEC) 40 MG capsule 90 capsule 0     Sig: Take 1 capsule by mouth Daily.        Pharmacy where request should be sent: [facility] ([place] - [place], [address] - [phone]  - [phone]     Last office visit with prescribing clinician: 4/14/2025   Last telemedicine visit with prescribing clinician: Visit date not found   Next office visit with prescribing clinician: 10/14/2025     Additional details provided by patient: PATIENT IS COMPLETELY OUT    Does the patient have less than a 3 day supply:  [x] Yes  [] No    Would you like a call back once the refill request has been completed: [x] Yes [] No    If the office needs to give you a call back, can they leave a voicemail: [x] Yes [] No    Duke Padilla   08/01/25 11:03 EDT

## (undated) DEVICE — SUT SILK 2/0 PS 18IN 1588H

## (undated) DEVICE — SPNG GZ WOVN 4X4IN 12PLY 10/BX STRL

## (undated) DEVICE — JELLY,LUBE,STERILE,FLIP TOP,TUBE,4-OZ: Brand: MEDLINE

## (undated) DEVICE — CLAVICLE STRAP: Brand: DEROYAL

## (undated) DEVICE — 3M™ IOBAN™ 2 ANTIMICROBIAL INCISE DRAPE 6650EZ: Brand: IOBAN™ 2

## (undated) DEVICE — 3M™ STERI-DRAPE™ INSTRUMENT POUCH 1018: Brand: STERI-DRAPE™

## (undated) DEVICE — SHEET,DRAPE,70X100,STERILE: Brand: MEDLINE

## (undated) DEVICE — SYRINGE,TOOMEY,IRRIGATION,70CC,STERILE: Brand: MEDLINE

## (undated) DEVICE — PK UROL DAVINCI 10

## (undated) DEVICE — LAPAROVUE VISIBILITY SYSTEM LAPAROSCOPIC SOLUTIONS: Brand: LAPAROVUE

## (undated) DEVICE — TBG PENCL TELESCP MEGADYNE SMOKE EVAC 10FT

## (undated) DEVICE — LEX PROSTATE ACCESSORY PACK: Brand: MEDLINE INDUSTRIES, INC.

## (undated) DEVICE — RICH MAJOR PROCEDURE: Brand: MEDLINE INDUSTRIES, INC.

## (undated) DEVICE — CANNULA SEAL

## (undated) DEVICE — SUT VIC 0 TIES 18IN J912G

## (undated) DEVICE — BLANKT WARM UPPR/BDY ARM/OUT 57X196CM

## (undated) DEVICE — PATIENT RETURN ELECTRODE, SINGLE-USE, CONTACT QUALITY MONITORING, ADULT, WITH 9FT CORD, FOR PATIENTS WEIGING OVER 33LBS. (15KG): Brand: MEGADYNE

## (undated) DEVICE — SUT VIC 0 UR6 27IN VCP603H

## (undated) DEVICE — ST TBG IRR BONE SCLPL

## (undated) DEVICE — Device

## (undated) DEVICE — 1000 S-DRAPE TOWEL DRAPE 10/BX: Brand: STERI-DRAPE™

## (undated) DEVICE — SYS SKIN CLS DERMABOND PRINEO W/22CM MESH TP

## (undated) DEVICE — LAP PORT CLOSURE GUIDES 5MM AND 10/12MM: Brand: LAP PORT CLOSURE GUIDES 5MM AND 10/12MM

## (undated) DEVICE — ENDOPATH XCEL BLADELESS TROCARS WITH STABILITY SLEEVES: Brand: ENDOPATH XCEL

## (undated) DEVICE — NDL SPINE 18G 31/2IN PNK

## (undated) DEVICE — DISH PETRI 3.5IN MD STRL LF

## (undated) DEVICE — HLDR CATH FOLEY STATLOCK TRICOT PED 3WY

## (undated) DEVICE — LEGGINGS, PAIR, 29X43, STERILE: Brand: MEDLINE

## (undated) DEVICE — DRSNG WND GZ PAD BORDERED 4X8IN STRL

## (undated) DEVICE — BIPOLAR SEALER 23-113-1 AQM 2.3: Brand: AQUAMANTYS™

## (undated) DEVICE — TRAP FLD MINIVAC MEGADYNE 100ML

## (undated) DEVICE — KT WARM LAP LIQUIDSCOPE/HELPOR W/WARMOR/CLTH 2/TROC/SWAB

## (undated) DEVICE — ST TBG AIRSEAL BIF FLTR W/ACT/CHARCOAL/FLTR

## (undated) DEVICE — ARM DRAPE

## (undated) DEVICE — SUT VIC 0/0 UR6 27IN DYED J603H

## (undated) DEVICE — GLV SURG BIOGEL LTX PF 8

## (undated) DEVICE — NEURO SPONGES: Brand: DEROYAL

## (undated) DEVICE — 4.0MM PRECISION ROUND

## (undated) DEVICE — COLUMN DRAPE

## (undated) DEVICE — SYR LUERLOK 10CC

## (undated) DEVICE — GOWN,SIRUS,NON REINFRCD XL XLONG: Brand: MEDLINE

## (undated) DEVICE — FLOSEAL MATRIX IS INDICATED IN SURGICAL PROCEDURES (OTHER THAN IN OPHTHALMIC) AS AN ADJUNCT TO HEMOSTASIS WHEN CONTROL OF BLEEDING BY LIGATURE OR CONVENTIONAL PROCEDURES IS INEFFECTIVE OR IMPRACTICAL.: Brand: FLOSEAL HEMOSTATIC MATRIX

## (undated) DEVICE — VIOLET BRAIDED (POLYGLACTIN 910), SYNTHETIC ABSORBABLE SUTURE: Brand: COATED VICRYL

## (undated) DEVICE — GLV SURG BIOGEL LTX PF 7

## (undated) DEVICE — 4-PORT MANIFOLD: Brand: NEPTUNE 2

## (undated) DEVICE — TUBING, SUCTION, 1/4" X 12', STRAIGHT: Brand: MEDLINE

## (undated) DEVICE — TRENDELENBURG WINGPAD POSITIONING KIT DELUXE - WITHOUT BODY STRAP: Brand: SOULE MEDICAL

## (undated) DEVICE — NDL HYPO ECLPS SFTY 22G 1 1/2IN

## (undated) DEVICE — DRSNG WND GZ PAD BORDERED LF 4X5IN STRL

## (undated) DEVICE — ADHS SKIN PREMIERPRO EXOFIN TOPICAL HI/VISC .5ML

## (undated) DEVICE — BLADELESS OBTURATOR: Brand: WECK VISTA

## (undated) DEVICE — SUT MNCRYL PLS ANTIB UD 4/0 PS2 18IN

## (undated) DEVICE — ST TBG CONN PNEUMOCLEAR EVAC SMOKE HEAT/HUMID

## (undated) DEVICE — ELECTRODE, PTFE, BLADE 6': Brand: MEDLINE

## (undated) DEVICE — APPL HEMOS FOR DELIVERY FLOSEAL

## (undated) DEVICE — TOWEL,OR,DSP,ST,BLUE,STD,4/PK,20PK/CS: Brand: MEDLINE

## (undated) DEVICE — TIP COVER ACCESSORY

## (undated) DEVICE — CUFF SCD HEMOFORCE SEQ CALF STD MD

## (undated) DEVICE — DRSNG WND BORDR/ADHS NONADHR/GZ LF 4X4IN STRL

## (undated) DEVICE — ANTIBACTERIAL UNDYED BRAIDED (POLYGLACTIN 910), SYNTHETIC ABSORBABLE SUTURE: Brand: COATED VICRYL

## (undated) DEVICE — TROC BLADLES ANCHORPORT/OPTI LP 12X120MM 1P/U